# Patient Record
Sex: FEMALE | Race: WHITE | Employment: FULL TIME | URBAN - METROPOLITAN AREA
[De-identification: names, ages, dates, MRNs, and addresses within clinical notes are randomized per-mention and may not be internally consistent; named-entity substitution may affect disease eponyms.]

---

## 2017-05-02 ENCOUNTER — HOSPITAL ENCOUNTER (OUTPATIENT)
Dept: OTHER | Age: 57
Discharge: HOME | End: 2017-05-02
Attending: OBSTETRICS & GYNECOLOGY | Admitting: OBSTETRICS & GYNECOLOGY

## 2017-05-02 VITALS
BODY MASS INDEX: 26.68 KG/M2 | HEIGHT: 62 IN | DIASTOLIC BLOOD PRESSURE: 72 MMHG | SYSTOLIC BLOOD PRESSURE: 118 MMHG | RESPIRATION RATE: 18 BRPM | TEMPERATURE: 98.5 F | HEART RATE: 86 BPM | WEIGHT: 145 LBS

## 2017-05-02 RX ORDER — DULOXETIN HYDROCHLORIDE 60 MG/1
60 CAPSULE, DELAYED RELEASE ORAL DAILY
COMMUNITY
End: 2020-01-06

## 2017-05-02 RX ORDER — AMITRIPTYLINE HYDROCHLORIDE 25 MG/1
25 TABLET, FILM COATED ORAL NIGHTLY
COMMUNITY
End: 2020-01-08 | Stop reason: SDUPTHER

## 2017-05-02 RX ORDER — LOSARTAN POTASSIUM 100 MG/1
100 TABLET ORAL DAILY
COMMUNITY
End: 2020-01-06

## 2017-05-02 RX ORDER — ESTRADIOL 10 UG/1
10 INSERT VAGINAL DAILY
COMMUNITY
End: 2020-01-06

## 2017-05-02 RX ORDER — ATORVASTATIN CALCIUM 20 MG/1
20 TABLET, FILM COATED ORAL DAILY
COMMUNITY
End: 2020-01-06

## 2017-06-20 ENCOUNTER — HOSPITAL ENCOUNTER (OUTPATIENT)
Dept: OTHER | Age: 57
Discharge: HOME | End: 2017-06-20
Attending: OBSTETRICS & GYNECOLOGY | Admitting: OBSTETRICS & GYNECOLOGY

## 2017-06-20 VITALS
DIASTOLIC BLOOD PRESSURE: 72 MMHG | SYSTOLIC BLOOD PRESSURE: 130 MMHG | WEIGHT: 145 LBS | BODY MASS INDEX: 26.68 KG/M2 | RESPIRATION RATE: 18 BRPM | TEMPERATURE: 98.3 F | HEIGHT: 62 IN

## 2017-06-20 RX ORDER — CYCLOBENZAPRINE HCL 5 MG
5 TABLET ORAL DAILY
COMMUNITY
End: 2020-01-08 | Stop reason: SDUPTHER

## 2017-08-01 ENCOUNTER — HOSPITAL ENCOUNTER (OUTPATIENT)
Dept: OTHER | Age: 57
Discharge: HOME | End: 2017-08-01
Attending: OBSTETRICS & GYNECOLOGY | Admitting: OBSTETRICS & GYNECOLOGY

## 2017-08-01 VITALS
HEIGHT: 62 IN | HEART RATE: 83 BPM | DIASTOLIC BLOOD PRESSURE: 83 MMHG | SYSTOLIC BLOOD PRESSURE: 147 MMHG | BODY MASS INDEX: 28.52 KG/M2 | WEIGHT: 155 LBS

## 2018-03-21 ENCOUNTER — PAT TELEPHONE (OUTPATIENT)
Dept: PREADMISSION TESTING | Age: 58
End: 2018-03-21

## 2018-03-21 VITALS — HEIGHT: 62 IN | WEIGHT: 155 LBS | BODY MASS INDEX: 28.52 KG/M2

## 2018-03-21 NOTE — PROGRESS NOTES
[facility] (formerly [facility]) time____0630________        Surgery time___0730_________    Take the following medications with a sip of water:    Do not eat or drink anything after 12:00 midnight prior to your surgery. EXCEPT PREP  This includes water chewing gum, mints and ice chips. You may brush your teeth and gargle the morning of your surgery, but do not swallow the water      You may be asked to stop blood thinners such as Coumadin, Plavix, Fragmin, Lovenox, etc., or any anti-inflammatories such as:  Aspirin, Ibuprofen, Advil, Naproxen prior to your surgery. We also ask that you stop any OTC medications such as fish oil, vitamin E, glucosamine, garlic, Multivitamins, COQ 10, etc.    We ask that you do not smoke 24 hours prior to surgery  We ask that you do not  drink any alcoholic beverages 24 hours prior to surgery     You must make arrangements for a responsible adult to take you home after your surgery. For your safety you will not be allowed to leave alone or drive yourself home. Your surgery will be cancelled if you do not have a ride home. Also for your safety, it is strongly suggested that someone stay with you the first 24 hours after your surgery. A parent or legal guardian must accompany a child scheduled for surgery and plan to stay at the hospital until the child is discharged. Please do not bring other children with you. For your comfort, please wear simple loose fitting clothing to the hospital.  Please do not bring valuables. Do not wear any make-up or nail polish on your fingers or toes      For your safety, please do not wear any jewelry or body piercing's on the day of surgery. All jewelry must be removed. If you have dentures, they will be removed before going to operating room. For your convenience, we will provide you with a container.     If you wear contact lenses or glasses, they will be removed, please bring a case for

## 2018-03-26 NOTE — ANESTHESIA PRE-OP
Department of Anesthesiology  Preprocedure Note       Name:  Sosa Agosto   Age:  61 y.o.  :  1960                                          MRN:  [id number]         Date:  3/26/2018      Surgeon:    Procedure:    Medications prior to admission:   Prior to Admission medications    Medication Sig Start Date End Date Taking? Authorizing Provider   cyclobenzaprine (FLEXERIL) 5 MG tablet Take 5 mg by mouth daily    Historical Provider, MD   DULoxetine (CYMBALTA) 60 MG extended release capsule Take 60 mg by mouth daily    Historical Provider, MD   losartan (COZAAR) 100 MG tablet Take 100 mg by mouth daily    Historical Provider, MD   amitriptyline (ELAVIL) 25 MG tablet Take 25 mg by mouth nightly    Historical Provider, MD   atorvastatin (LIPITOR) 20 MG tablet Take 20 mg by mouth daily    Historical Provider, MD   Estradiol (VAGIFEM) 10 MCG TABS vaginal tablet Place 10 mcg vaginally daily    Historical Provider, MD       Current medications:    Current Outpatient Prescriptions   Medication Sig Dispense Refill    cyclobenzaprine (FLEXERIL) 5 MG tablet Take 5 mg by mouth daily      DULoxetine (CYMBALTA) 60 MG extended release capsule Take 60 mg by mouth daily      losartan (COZAAR) 100 MG tablet Take 100 mg by mouth daily      amitriptyline (ELAVIL) 25 MG tablet Take 25 mg by mouth nightly      atorvastatin (LIPITOR) 20 MG tablet Take 20 mg by mouth daily      Estradiol (VAGIFEM) 10 MCG TABS vaginal tablet Place 10 mcg vaginally daily       No current facility-administered medications for this encounter. Allergies:     Allergies   Allergen Reactions    Sansert [Methysergide]     Vicodin [Hydrocodone-Acetaminophen]        Problem List:    Patient Active Problem List   Diagnosis Code    [facility] DJD(carpometacarpal degenerative joint disease), localized primary M19.049       Past Medical History:        Diagnosis Date    HBP (high blood pressure)     Hyperlipidemia     Sleep apnea     CPAP       Past Surgical History:        Procedure Laterality Date    CARPAL TUNNEL RELEASE       SECTION      FOOT NEUROMA SURGERY      HYSTERECTOMY         Social History:    Social History   Substance Use Topics    Smoking status: Never Smoker    Smokeless tobacco: Never Used    Alcohol use Yes      Comment: monthly, occasional drink                                Counseling given: Not Answered      Vital Signs (Current): There were no vitals filed for this visit. BP Readings from Last 3 Encounters:   17 (!) 147/83   17 130/72   17 118/72       NPO Status:                                                                                 BMI:   Wt Readings from Last 3 Encounters:   18 155 lb (70.3 kg)   17 155 lb (70.3 kg)   17 145 lb (65.8 kg)     There is no height or weight on file to calculate BMI. Anesthesia Evaluation  Patient summary reviewed and Nursing notes reviewed no history of anesthetic complications:   Airway: Mallampati: II  TM distance: >3 FB   Neck ROM: full  Mouth opening: > = 3 FB Dental:          Pulmonary:Negative Pulmonary ROS and normal exam    (+) sleep apnea: on CPAP,                             Cardiovascular:Negative CV ROS    (+) hypertension:,                ROS comment: No chest pain. > 4 mets. No anticoagulation. Neuro/Psych:   Negative Neuro/Psych ROS              GI/Hepatic/Renal: Neg GI/Hepatic/Renal ROS       (-) hiatal hernia and GERD       Endo/Other: Negative Endo/Other ROS                    Abdominal:           Vascular:                                   NPO > MN    ECHO 2011   EF 60% Mild AI LVH    Pre-Operative Diagnosis: POLYP SURVEILLANCE/ ANTHEM    61 y.o.   BMI:  Body mass index is 28.13 kg/m².      Vitals:    18 0705   BP: (!) 156/91   Pulse: 92   Resp: 18   Temp: 98.3 °F (36.8 °C)   TempSrc: Temporal   SpO2: 97%   Weight: 153 lb 12.8 oz (69.8 kg)   Height: 5' 2\" (1.575 m)

## 2018-03-27 ENCOUNTER — HOSPITAL ENCOUNTER (OUTPATIENT)
Dept: ENDOSCOPY | Age: 58
Discharge: HOME | End: 2018-03-27
Attending: INTERNAL MEDICINE | Admitting: INTERNAL MEDICINE

## 2018-03-27 VITALS
DIASTOLIC BLOOD PRESSURE: 85 MMHG | BODY MASS INDEX: 28.3 KG/M2 | RESPIRATION RATE: 16 BRPM | TEMPERATURE: 98 F | OXYGEN SATURATION: 97 % | WEIGHT: 153.8 LBS | HEIGHT: 62 IN | HEART RATE: 74 BPM | SYSTOLIC BLOOD PRESSURE: 131 MMHG

## 2018-03-27 DIAGNOSIS — Z86.010 HISTORY OF COLONIC POLYPS: ICD-10-CM

## 2018-03-27 RX ORDER — MEPERIDINE HYDROCHLORIDE 25 MG/ML
12.5 INJECTION INTRAMUSCULAR; INTRAVENOUS; SUBCUTANEOUS EVERY 5 MIN PRN
Status: DISCONTINUED | OUTPATIENT
Start: 2018-03-27 | End: 2018-03-28 | Stop reason: HOSPADM

## 2018-03-27 RX ORDER — ONDANSETRON 2 MG/ML
4 INJECTION INTRAMUSCULAR; INTRAVENOUS
Status: ACTIVE | OUTPATIENT
Start: 2018-03-27 | End: 2018-03-27

## 2018-03-27 RX ORDER — MORPHINE SULFATE 4 MG/ML
2 INJECTION, SOLUTION INTRAMUSCULAR; INTRAVENOUS EVERY 5 MIN PRN
Status: DISCONTINUED | OUTPATIENT
Start: 2018-03-27 | End: 2018-03-28 | Stop reason: HOSPADM

## 2018-03-27 RX ORDER — SODIUM CHLORIDE 0.9 % (FLUSH) 0.9 %
10 SYRINGE (ML) INJECTION PRN
Status: DISCONTINUED | OUTPATIENT
Start: 2018-03-27 | End: 2018-03-28 | Stop reason: HOSPADM

## 2018-03-27 RX ORDER — SODIUM CHLORIDE 9 MG/ML
INJECTION, SOLUTION INTRAVENOUS CONTINUOUS
Status: DISCONTINUED | OUTPATIENT
Start: 2018-03-27 | End: 2018-03-28 | Stop reason: HOSPADM

## 2018-03-27 RX ORDER — LABETALOL HYDROCHLORIDE 5 MG/ML
5 INJECTION, SOLUTION INTRAVENOUS EVERY 10 MIN PRN
Status: DISCONTINUED | OUTPATIENT
Start: 2018-03-27 | End: 2018-03-28 | Stop reason: HOSPADM

## 2018-03-27 RX ORDER — SODIUM CHLORIDE 0.9 % (FLUSH) 0.9 %
10 SYRINGE (ML) INJECTION EVERY 12 HOURS SCHEDULED
Status: DISCONTINUED | OUTPATIENT
Start: 2018-03-27 | End: 2018-03-28 | Stop reason: HOSPADM

## 2018-03-27 RX ORDER — HYDRALAZINE HYDROCHLORIDE 20 MG/ML
5 INJECTION INTRAMUSCULAR; INTRAVENOUS
Status: DISCONTINUED | OUTPATIENT
Start: 2018-03-27 | End: 2018-03-28 | Stop reason: HOSPADM

## 2018-03-27 RX ORDER — MORPHINE SULFATE 4 MG/ML
1 INJECTION, SOLUTION INTRAMUSCULAR; INTRAVENOUS EVERY 5 MIN PRN
Status: DISCONTINUED | OUTPATIENT
Start: 2018-03-27 | End: 2018-03-28 | Stop reason: HOSPADM

## 2018-03-27 RX ORDER — DIPHENHYDRAMINE HYDROCHLORIDE 50 MG/ML
12.5 INJECTION INTRAMUSCULAR; INTRAVENOUS
Status: ACTIVE | OUTPATIENT
Start: 2018-03-27 | End: 2018-03-27

## 2018-03-27 RX ADMIN — SODIUM CHLORIDE: 9 INJECTION, SOLUTION INTRAVENOUS at 07:09

## 2018-03-27 ASSESSMENT — PAIN SCALES - GENERAL
PAINLEVEL_OUTOF10: 0

## 2018-03-27 ASSESSMENT — PAIN SCALES - WONG BAKER: WONGBAKER_NUMERICALRESPONSE: 0

## 2018-03-27 ASSESSMENT — PAIN DESCRIPTION - DESCRIPTORS: DESCRIPTORS: ACHING

## 2018-03-27 ASSESSMENT — PAIN - FUNCTIONAL ASSESSMENT: PAIN_FUNCTIONAL_ASSESSMENT: 0-10

## 2018-03-27 NOTE — ANESTHESIA POST-OP
Postoperative Anesthesia Note    Name:    Nu Schmidt  MRN:      [id number]    Patient Vitals for the past 12 hrs:   BP Temp Temp src Pulse Resp SpO2 Height Weight   03/27/18 0810 131/85 - - 74 16 97 % - -   03/27/18 0800 122/66 - - 80 16 98 % - -   03/27/18 0750 116/68 98 °F (36.7 °C) Temporal 76 18 97 % - -   03/27/18 0705 (!) 156/91 98.3 °F (36.8 °C) Temporal 92 18 97 % 5' 2\" (1.575 m) 153 lb 12.8 oz (69.8 kg)        LABS:    CBC  No results found for: WBC, HGB, HCT, PLT  RENAL  No results found for: NA, K, CL, CO2, BUN, CREATININE, GLUCOSE  COAGS  No results found for: PROTIME, INR, APTT    Intake & Output: In: 350 [I.V.:350]  Out: -     Nausea & Vomiting:  No    Level of Consciousness:  Awake    Pain Assessment:  Adequate analgesia    Anesthesia Complications:  No apparent anesthetic complications    SUMMARY      Vital signs stable  OK to discharge from Stage I post anesthesia care.   Care transferred from Anesthesiology department on discharge from perioperative area

## 2018-03-27 NOTE — PROCEDURES
[address] [facility] GI  Endoscopy Note    Patient: Prince Qiu  : 1960  Acct#: [de-identified]    Procedure: Colonoscopy with biopsy    Date:  3/27/2018    Surgeon:  Mallory Tyson MD    Referring Physician:  Nathan Batista    Previous Colonoscopy: Yes  Date: 10/3/12  Greater than 3 years? Yes    Preoperative Diagnosis:  surveillance    Postoperative Diagnosis:  Colon polyp    Anesthesia:  See anesthesia note    Indications: This is a 61y.o. year old female who presents today with previous adenomatous polyp. Procedure: An informed consent was obtained from the patient after explanation of indications, benefits, possible risks and complications of the procedure. The patient was then taken to the endoscopy suite, placed in the left lateral decubitus position, and the above IV anesthesia was administered. A digital rectal examination was performed and revealed negative without mass, lesions or tenderness. The Olympus CFQ-180-AL video colonoscope was placed in the patient's rectum under digital direction and advanced to the cecum. The cecum was identified by characteristic anatomy and ballottment. The prep was fair. The ileocecal valve was identified. The scope was then withdrawn back through the cecum, ascending, transverse, descending and sigmoid colons. Carefull circumferential examination of the mucosa in these areas demonstrated a 3 mm polyp in the descending colon that was biopsied and removed. The scope was then withdrawn into the rectum and retroflexed. The retroflexed view of the anal verge and rectum demonstrates no abnormalities. The scope was straightened, the colon was decompressed and the scope was withdrawn from the patient. The patient tolerated the procedure well and was taken to the PACU in good condition. Estimated Blood Loss:  none    Impression: Colon polyp    Recommendations:  Await pathology. Repeat colonoscopy in 5 years.     Mallory Tyson MD   [facility]  3/27/2018

## 2019-05-06 ENCOUNTER — EMPLOYEE WELLNESS (OUTPATIENT)
Dept: OTHER | Age: 59
End: 2019-05-06

## 2019-05-06 LAB
CHOLESTEROL, TOTAL: 187 MG/DL (ref 0–199)
GLUCOSE BLD-MCNC: 87 MG/DL (ref 70–99)
HDLC SERPL-MCNC: 44 MG/DL (ref 40–60)
LDL CHOLESTEROL CALCULATED: 123 MG/DL
TRIGL SERPL-MCNC: 100 MG/DL (ref 0–150)

## 2019-05-13 VITALS — WEIGHT: 143 LBS | BODY MASS INDEX: 26.16 KG/M2

## 2020-01-05 NOTE — PROGRESS NOTES
Chief Complaint   Patient presents with   Edin Norris Doctor     Family History   Problem Relation Age of Onset    Arthritis Other     Asthma Other     Cancer Other     Diabetes Other     High Blood Pressure Other     Breast Cancer Mother     Other Mother      Social History     Socioeconomic History    Marital status:      Spouse name: Not on file    Number of children: Not on file    Years of education: Not on file    Highest education level: Not on file   Occupational History    Not on file   Social Needs    Financial resource strain: Not on file    Food insecurity:     Worry: Not on file     Inability: Not on file    Transportation needs:     Medical: Not on file     Non-medical: Not on file   Tobacco Use    Smoking status: Never Smoker    Smokeless tobacco: Never Used   Substance and Sexual Activity    Alcohol use: Yes     Comment: monthly, occasional drink    Drug use: No    Sexual activity: Yes   Lifestyle    Physical activity:     Days per week: Not on file     Minutes per session: Not on file    Stress: Not on file   Relationships    Social connections:     Talks on phone: Not on file     Gets together: Not on file     Attends Congregational service: Not on file     Active member of club or organization: Not on file     Attends meetings of clubs or organizations: Not on file     Relationship status: Not on file    Intimate partner violence:     Fear of current or ex partner: Not on file     Emotionally abused: Not on file     Physically abused: Not on file     Forced sexual activity: Not on file   Other Topics Concern    Not on file   Social History Narrative    Not on file       Current Outpatient Medications:     atorvastatin (LIPITOR) 80 MG tablet, TAKE 1 TABLET BY MOUTH ONCE DAILY, Disp: , Rfl:     etodolac (LODINE) 400 MG tablet, Take 400 mg by mouth 2 times daily, Disp: , Rfl:     fluticasone (FLONASE) 50 MCG/ACT nasal spray, 1 spray by Nasal route, Disp: , Rfl:   losartan-hydrochlorothiazide (HYZAAR) 100-12.5 MG per tablet, TAKE 1 TABLET BY MOUTH ONCE DAILY, Disp: , Rfl:     cyclobenzaprine (FLEXERIL) 5 MG tablet, Take 5 mg by mouth daily, Disp: , Rfl:     amitriptyline (ELAVIL) 25 MG tablet, Take 25 mg by mouth nightly, Disp: , Rfl:   Allergies   Allergen Reactions    Sansert [Methysergide]     Vicodin [Hydrocodone-Acetaminophen]        Patient Active Problem List   Diagnosis    Aia 16 DJD(carpometacarpal degenerative joint disease), localized primary    Fibromyalgia    Essential hypertension    Mixed hyperlipidemia    Dysthymia       HPI / ROS: Romina Hand presents for evaluation and management of :    New patient to me appears formerly saw Dr. Beatrice Randhawa    # Hyperlipidemia on statin rigoberto this no myalgias or weakness  Lab Results   Component Value Date    LDLCALC 123 (H) 05/06/2019      No results found for: ALT, AST, GGT, ALKPHOS, BILITOT   # Depression - denies SI. OK on current med(s) per patient. # fibromyalgia / chronic pain/fatigue   apparently per meds  # HTN - rigoberto meds no CP/SOB  Lab Results   Component Value Date    GLUCOSE 87 05/06/2019       # d/w screening options re colon, cerv, breast ca screening  # reports mammogram [facility]  # colon screening has had colonoscopy twice for 1 polyp goes to Dr. Hudson Calhoun next 2 years       Objective   Wt Readings from Last 3 Encounters:   01/06/20 153 lb 6.4 oz (69.6 kg)   05/06/19 143 lb (64.9 kg)   03/27/18 153 lb 12.8 oz (69.8 kg)       A&O  /70   Pulse 85   Resp 15   Wt 153 lb 6.4 oz (69.6 kg)   SpO2 97%   BMI 28.06 kg/m²   Eyes no scleral icterus  Skin no rash no jaundice  Neck no TMG no LAD  Car reg no MGR  Lungs CTA  abd benign soft  Ext no pitting edema  Psych: Judgement and insight are intact, no pressured speech; no psychomotor retardation or agitation; affect and mood congruent     Diagnosis Orders   1. Essential hypertension  Comprehensive Metabolic Panel    at goal check renal   2.  Mixed

## 2020-01-06 ENCOUNTER — OFFICE VISIT (OUTPATIENT)
Dept: FAMILY MEDICINE CLINIC | Age: 60
End: 2020-01-06
Payer: COMMERCIAL

## 2020-01-06 VITALS
OXYGEN SATURATION: 97 % | HEART RATE: 85 BPM | BODY MASS INDEX: 28.06 KG/M2 | RESPIRATION RATE: 15 BRPM | WEIGHT: 153.4 LBS | SYSTOLIC BLOOD PRESSURE: 124 MMHG | DIASTOLIC BLOOD PRESSURE: 70 MMHG

## 2020-01-06 PROBLEM — F34.1 DYSTHYMIA: Status: ACTIVE | Noted: 2020-01-06

## 2020-01-06 PROBLEM — M79.7 FIBROMYALGIA: Status: ACTIVE | Noted: 2020-01-06

## 2020-01-06 PROBLEM — I10 ESSENTIAL HYPERTENSION: Status: ACTIVE | Noted: 2020-01-06

## 2020-01-06 PROBLEM — E78.2 MIXED HYPERLIPIDEMIA: Status: ACTIVE | Noted: 2020-01-06

## 2020-01-06 PROCEDURE — 99203 OFFICE O/P NEW LOW 30 MIN: CPT | Performed by: FAMILY MEDICINE

## 2020-01-06 RX ORDER — ETODOLAC 400 MG/1
400 TABLET, FILM COATED ORAL 2 TIMES DAILY
COMMUNITY
Start: 2019-09-25 | End: 2020-01-08 | Stop reason: SDUPTHER

## 2020-01-06 RX ORDER — ATORVASTATIN CALCIUM 80 MG/1
TABLET, FILM COATED ORAL
COMMUNITY
Start: 2019-10-21 | End: 2020-01-08 | Stop reason: SDUPTHER

## 2020-01-06 RX ORDER — FLUTICASONE PROPIONATE 50 MCG
1 SPRAY, SUSPENSION (ML) NASAL DAILY PRN
COMMUNITY
Start: 2016-03-23

## 2020-01-06 RX ORDER — LOSARTAN POTASSIUM AND HYDROCHLOROTHIAZIDE 12.5; 1 MG/1; MG/1
TABLET ORAL
COMMUNITY
Start: 2019-12-04 | End: 2020-01-08 | Stop reason: SDUPTHER

## 2020-01-08 ENCOUNTER — TELEPHONE (OUTPATIENT)
Dept: FAMILY MEDICINE CLINIC | Age: 60
End: 2020-01-08

## 2020-01-08 NOTE — TELEPHONE ENCOUNTER
Pt needs a refill on all medication EXCEPT Flonase sent to [facility] pharmacy. Please call with any questions.

## 2020-01-09 RX ORDER — LOSARTAN POTASSIUM AND HYDROCHLOROTHIAZIDE 12.5; 1 MG/1; MG/1
TABLET ORAL
Qty: 90 TABLET | Refills: 3 | Status: SHIPPED | OUTPATIENT
Start: 2020-01-09 | End: 2020-11-25 | Stop reason: SDUPTHER

## 2020-01-09 RX ORDER — ATORVASTATIN CALCIUM 80 MG/1
TABLET, FILM COATED ORAL
Qty: 90 TABLET | Refills: 3 | Status: SHIPPED | OUTPATIENT
Start: 2020-01-09 | End: 2020-11-25 | Stop reason: SDUPTHER

## 2020-01-09 RX ORDER — CYCLOBENZAPRINE HCL 5 MG
5 TABLET ORAL DAILY
Qty: 90 TABLET | Refills: 3 | Status: SHIPPED | OUTPATIENT
Start: 2020-01-09 | End: 2020-12-08

## 2020-01-09 RX ORDER — AMITRIPTYLINE HYDROCHLORIDE 25 MG/1
25 TABLET, FILM COATED ORAL NIGHTLY
Qty: 90 TABLET | Refills: 3 | Status: SHIPPED | OUTPATIENT
Start: 2020-01-09 | End: 2020-12-08

## 2020-01-09 RX ORDER — ETODOLAC 400 MG/1
400 TABLET, FILM COATED ORAL 2 TIMES DAILY
Qty: 180 TABLET | Refills: 3 | Status: SHIPPED | OUTPATIENT
Start: 2020-01-09 | End: 2020-02-26

## 2020-01-13 ENCOUNTER — TELEPHONE (OUTPATIENT)
Dept: ORTHOPEDIC SURGERY | Age: 60
End: 2020-01-13

## 2020-01-13 ENCOUNTER — OFFICE VISIT (OUTPATIENT)
Dept: ORTHOPEDIC SURGERY | Age: 60
End: 2020-01-13
Payer: COMMERCIAL

## 2020-01-13 VITALS
DIASTOLIC BLOOD PRESSURE: 71 MMHG | HEART RATE: 81 BPM | RESPIRATION RATE: 16 BRPM | SYSTOLIC BLOOD PRESSURE: 122 MMHG | BODY MASS INDEX: 28.16 KG/M2 | HEIGHT: 62 IN | WEIGHT: 153 LBS | TEMPERATURE: 98.1 F

## 2020-01-13 PROCEDURE — 99203 OFFICE O/P NEW LOW 30 MIN: CPT | Performed by: ORTHOPAEDIC SURGERY

## 2020-01-13 NOTE — LETTER
WEIGHT:  Wt Readings from Last 1 Encounters:   01/13/20 153 lb (69.4 kg)         ALLERGIES:Sansert [methysergide] and Vicodin [hydrocodone-acetaminophen]                           SURG   5/26/20                               JET   __________________________________________________________________  PRE-OP ORDERS:  ? CBC WITH DIFFERENTIAL                                                ? TYPE AND SCREEN                                                            ? HgB A1C                                                                               ? EKG                                                                                        ? NASAL CULUTRE MRSA  ? UAR/if positive repeat UAR on admission  ? BMP           ALBUMIN AND PREALBUMIN           VITAMIN D LEVELS  ? COAG PROFILE  ? SED RATE  ? PT/OT EVAL AND TEACHING  ? INSTRUCT PT TO STOP ALL NSAIDS, ASPIRIN, BLOOD THINNERS 7 DAYS PRIOR SURGERY  DAY OF SURGERY  ? CEFAZOLIN 2 GM IVPB; IF PATIENT WEIGHS > 80 KG AND SERUM CREATININE <2.5 mg/dl, GIVE 2 GM DOSE WITHIN 1 HOUR OF INCISION. ? IF THE PRE-OP NASAL CULTURE FOR MRSA WAS POSITIVE:   REPEAT NASAL SWAB ON ADMISSION AND ADMINISTER VANCOMYCIN 1 GM IVPB, REDUCE THE DOSE OF VANCOMYCIN  MG IVPB IF PT < 55 KG OR SERUM CREATININE > 2mg/dl; also to get Cefazolin 2 GM or wt based  ? All patients will receive preop Cefazolin 2 GM or wt based   ? APPLY KNEE HIGH ANTI-EMBOLIC AND PNEUMO-BOOTS TO UNOPERATIVE  LEG  ? CELEBREX  200 MG  ORALLY  DAY OF SURGERY  ?  ROXICODONE 10MG  ORALLY DAY OF SURGERY  OTHER ORDERS:_______________________________________________________PHYSICIAN SIGNATURE: __ 1/13/20                                                                                                       9:37 AM  ________________________DATE:

## 2020-01-13 NOTE — TELEPHONE ENCOUNTER
.Order received and faxed to [address] [facility]. Pt should call to schedule follow up for results after scan is completed.

## 2020-01-14 DIAGNOSIS — M17.12 PRIMARY OSTEOARTHRITIS OF LEFT KNEE: ICD-10-CM

## 2020-01-14 LAB
ALBUMIN SERPL-MCNC: 4.7 G/DL (ref 3.4–5)
ANION GAP SERPL CALCULATED.3IONS-SCNC: 14 MMOL/L (ref 3–16)
APTT: 34.8 SEC (ref 24.2–36.2)
BASOPHILS ABSOLUTE: 0.1 K/UL (ref 0–0.2)
BASOPHILS RELATIVE PERCENT: 0.9 %
BILIRUBIN URINE: ABNORMAL
BLOOD, URINE: NEGATIVE
BUN BLDV-MCNC: 19 MG/DL (ref 7–20)
CALCIUM SERPL-MCNC: 9.5 MG/DL (ref 8.3–10.6)
CHLORIDE BLD-SCNC: 103 MMOL/L (ref 99–110)
CLARITY: CLEAR
CO2: 27 MMOL/L (ref 21–32)
COLOR: YELLOW
CREAT SERPL-MCNC: 0.6 MG/DL (ref 0.6–1.2)
EOSINOPHILS ABSOLUTE: 0.2 K/UL (ref 0–0.6)
EOSINOPHILS RELATIVE PERCENT: 2.9 %
EPITHELIAL CELLS, UA: 1 /HPF (ref 0–5)
ESTIMATED AVERAGE GLUCOSE: 108.3 MG/DL
GFR AFRICAN AMERICAN: >60
GFR NON-AFRICAN AMERICAN: >60
GLUCOSE BLD-MCNC: 87 MG/DL (ref 70–99)
GLUCOSE URINE: NEGATIVE MG/DL
HBA1C MFR BLD: 5.4 %
HCT VFR BLD CALC: 38.6 % (ref 36–48)
HEMOGLOBIN: 13 G/DL (ref 12–16)
HYALINE CASTS: 1 /LPF (ref 0–8)
INR BLD: 0.91 (ref 0.86–1.14)
KETONES, URINE: NEGATIVE MG/DL
LEUKOCYTE ESTERASE, URINE: ABNORMAL
LYMPHOCYTES ABSOLUTE: 2.6 K/UL (ref 1–5.1)
LYMPHOCYTES RELATIVE PERCENT: 43 %
MCH RBC QN AUTO: 30 PG (ref 26–34)
MCHC RBC AUTO-ENTMCNC: 33.7 G/DL (ref 31–36)
MCV RBC AUTO: 88.8 FL (ref 80–100)
MICROSCOPIC EXAMINATION: YES
MONOCYTES ABSOLUTE: 0.5 K/UL (ref 0–1.3)
MONOCYTES RELATIVE PERCENT: 7.8 %
NEUTROPHILS ABSOLUTE: 2.8 K/UL (ref 1.7–7.7)
NEUTROPHILS RELATIVE PERCENT: 45.4 %
NITRITE, URINE: NEGATIVE
PDW BLD-RTO: 13.1 % (ref 12.4–15.4)
PH UA: 6.5 (ref 5–8)
PLATELET # BLD: 252 K/UL (ref 135–450)
PMV BLD AUTO: 9.5 FL (ref 5–10.5)
POTASSIUM SERPL-SCNC: 3.7 MMOL/L (ref 3.5–5.1)
PROTEIN UA: NEGATIVE MG/DL
PROTHROMBIN TIME: 10.5 SEC (ref 10–13.2)
RBC # BLD: 4.35 M/UL (ref 4–5.2)
RBC UA: 5 /HPF (ref 0–4)
SODIUM BLD-SCNC: 144 MMOL/L (ref 136–145)
SPECIFIC GRAVITY UA: 1.02 (ref 1–1.03)
TRANSFERRIN: 268 MG/DL (ref 200–360)
URINE REFLEX TO CULTURE: YES
URINE TYPE: ABNORMAL
UROBILINOGEN, URINE: 0.2 E.U./DL
VITAMIN D 25-HYDROXY: 35.6 NG/ML
WBC # BLD: 6.1 K/UL (ref 4–11)
WBC UA: 4 /HPF (ref 0–5)

## 2020-01-15 LAB — URINE CULTURE, ROUTINE: NORMAL

## 2020-01-20 ENCOUNTER — OFFICE VISIT (OUTPATIENT)
Dept: SLEEP MEDICINE | Age: 60
End: 2020-01-20
Payer: COMMERCIAL

## 2020-01-20 VITALS
OXYGEN SATURATION: 98 % | BODY MASS INDEX: 28.34 KG/M2 | DIASTOLIC BLOOD PRESSURE: 84 MMHG | HEART RATE: 75 BPM | RESPIRATION RATE: 16 BRPM | SYSTOLIC BLOOD PRESSURE: 134 MMHG | HEIGHT: 62 IN | WEIGHT: 154 LBS

## 2020-01-20 PROCEDURE — 99203 OFFICE O/P NEW LOW 30 MIN: CPT | Performed by: PSYCHIATRY & NEUROLOGY

## 2020-01-20 ASSESSMENT — SLEEP AND FATIGUE QUESTIONNAIRES
HOW LIKELY ARE YOU TO NOD OFF OR FALL ASLEEP WHILE LYING DOWN TO REST IN THE AFTERNOON WHEN CIRCUMSTANCES PERMIT: 2
ESS TOTAL SCORE: 10
HOW LIKELY ARE YOU TO NOD OFF OR FALL ASLEEP WHILE SITTING INACTIVE IN A PUBLIC PLACE: 0
NECK CIRCUMFERENCE (INCHES): 14
HOW LIKELY ARE YOU TO NOD OFF OR FALL ASLEEP WHILE SITTING QUIETLY AFTER LUNCH WITHOUT ALCOHOL: 2
HOW LIKELY ARE YOU TO NOD OFF OR FALL ASLEEP IN A CAR, WHILE STOPPED FOR A FEW MINUTES IN TRAFFIC: 1
HOW LIKELY ARE YOU TO NOD OFF OR FALL ASLEEP WHILE WATCHING TV: 1
HOW LIKELY ARE YOU TO NOD OFF OR FALL ASLEEP WHEN YOU ARE A PASSENGER IN A CAR FOR AN HOUR WITHOUT A BREAK: 1
HOW LIKELY ARE YOU TO NOD OFF OR FALL ASLEEP WHILE SITTING AND TALKING TO SOMEONE: 0
HOW LIKELY ARE YOU TO NOD OFF OR FALL ASLEEP WHILE SITTING AND READING: 3

## 2020-01-20 ASSESSMENT — ENCOUNTER SYMPTOMS
APNEA: 0
EYES NEGATIVE: 1
ALLERGIC/IMMUNOLOGIC NEGATIVE: 1
RESPIRATORY NEGATIVE: 1
GASTROINTESTINAL NEGATIVE: 1

## 2020-01-20 NOTE — PROGRESS NOTES
in addition to the Patient has significant daytime sleepiness. The Patient scored Total score: 10 on [place] Sleepiness Scale ( more than 10 is indicative of daytime sleepiness)and 42 in fatigue scale ( more than 36 is indicativeof daytime fatigue). The patient takes one nap a week for 30-60 minutes and usually is not refreshing nap. Previous evaluation and treatment has included- PSG with C PAP titration. Her HTN is stable.        DOT/CDL - N/A  FAA/'aziza - N/A      Previous Report(s) Reviewed: historical medical records         Social History     Socioeconomic History    Marital status:      Spouse name: Not on file    Number of children: Not on file    Years of education: Not on file    Highest education level: Not on file   Occupational History    Not on file   Social Needs    Financial resource strain: Not on file    Food insecurity:     Worry: Not on file     Inability: Not on file    Transportation needs:     Medical: Not on file     Non-medical: Not on file   Tobacco Use    Smoking status: Never Smoker    Smokeless tobacco: Never Used   Substance and Sexual Activity    Alcohol use: Yes     Comment: monthly, occasional drink    Drug use: No    Sexual activity: Yes   Lifestyle    Physical activity:     Days per week: Not on file     Minutes per session: Not on file    Stress: Not on file   Relationships    Social connections:     Talks on phone: Not on file     Gets together: Not on file     Attends Denominational service: Not on file     Active member of club or organization: Not on file     Attends meetings of clubs or organizations: Not on file     Relationship status: Not on file    Intimate partner violence:     Fear of current or ex partner: Not on file     Emotionally abused: Not on file     Physically abused: Not on file     Forced sexual activity: Not on file   Other Topics Concern    Not on file   Social History Narrative    Not on file       Prior to Admission medications    Medication Sig Start Date End Date Taking? Authorizing Provider   Solriamfetol HCl 75 MG TABS 1/2 QAM for a week then one tab QAM PRN 20  Yes Areli Freitas MD   losartan-hydrochlorothiazide (HYZAAR) 100-12.5 MG per tablet TAKE 1 TABLET BY MOUTH ONCE DAILY 20  Yes Brianna Chen MD   amitriptyline (ELAVIL) 25 MG tablet Take 1 tablet by mouth nightly 20  Yes Brianna Chen MD   atorvastatin (LIPITOR) 80 MG tablet TAKE 1 TABLET BY MOUTH ONCE DAILY 20  Yes Brianna Chen MD   cyclobenzaprine (FLEXERIL) 5 MG tablet Take 1 tablet by mouth daily 20  Yes Brianna Chen MD   etodolac (LODINE) 400 MG tablet Take 1 tablet by mouth 2 times daily 20  Yes Brianna Chen MD   fluticasone [facility]) 50 MCG/ACT nasal spray 1 spray by Nasal route 3/23/16  Yes Historical Provider, MD       Allergies as of 2020 - Review Complete 2020   Allergen Reaction Noted    Sansert [methysergide]  2018    Vicodin [hydrocodone-acetaminophen]  2013       Patient Active Problem List   Diagnosis    [facility] DJD(carpometacarpal degenerative joint disease), localized primary    Fibromyalgia    Essential hypertension    Mixed hyperlipidemia    Dysthymia       Past Medical History:   Diagnosis Date    HBP (high blood pressure)     Hyperlipidemia     Plantar fasciitis     Sleep apnea     CPAP       Past Surgical History:   Procedure Laterality Date    CARPAL TUNNEL RELEASE       SECTION  6397,8411, 1986, 1994    COLONOSCOPY  2018    Dr. Gwyn Rojas with polyp    FOOT NEUROMA SURGERY      HYSTERECTOMY      LASIK      SHOULDER SURGERY      SPINAL FUSION      L4       Family History   Problem Relation Age of Onset    Arthritis Other     Asthma Other     Cancer Other     Diabetes Other     High Blood Pressure Other     Breast Cancer Mother     Other Mother        Review of Systems   Constitutional: Positive for fatigue.    HENT: Positive for congestion General: No focal deficit present. Psychiatric:         Mood and Affect: Mood normal.         Assessment:   Severe Obstructive Sleep Apnea/Hypopnea Syndrome under good control on PAP, but has residual EDS     Diagnosis Orders   1. Hypersomnia  Solriamfetol HCl 75 MG TABS   2. DAVID on CPAP     3. DAVID treated with BiPAP  Solriamfetol HCl 75 MG TABS     Plan: Will continue the APAP between 5 and 20 cm,  I will try Sunosi for his residual daytime sleepiness. I will order CPAP supplies, mask, filters. No orders of the defined types were placed in this encounter. Return in about 6 months (around 7/20/2020) for EDS.     Monika Jeffers MD  Medical Director - [facility]

## 2020-01-20 NOTE — PROGRESS NOTES
RAPT  RISK ASSESSMENT and PREDICTION TOOL    Name: Gus Perkins  YOB: 1960  Surgeon: Dr Danae Ospina         Value Score    1). What is your age group? 50 - 65 years  = 2      66 - 75 years = 1     > 75 years = 0       Your score = 2   2). Gender? Male = 2     Female = 1       Your score = 1   3). How far on average can you walk? Two blocks or more (+/- rest) = 2    (a block is 200 [id number]=764 ft)  1 - 2 blocks (+/- rest) = 1     Housebound (most of time) = 0       Your score = 2   4). Which gait aid do you use? None = 2    (more often than not) Single-point stick = 1     Crutches/walker = 0       Your score = 2   5). Do you use community supports? None or one per week = 1    (home help, meals on wheels, district nursing) Two or more per week = 0       Your score = 1   6). Will you live with someone who can care for you after your operation? yes = 3     no = 0       Your score = 3    Your Total Score (out of 12) = 11       Key: Destination at discharge from acute care predicted by score.   Score < 6  = extended inpatient rehabilitation  Score 6 - 9  = additional intervention to discharge directly home (Rehab in the home)  Score > 9  = directly home      Patient's Preference Prediction Score Agreed destination   home 11 yes   Gus Perkins  Date: 1/13/20
This dictation was done with [employer] dictation and may contain mechanical errors related to translation. Blood pressure 122/71, pulse 81, temperature 98.1 °F (36.7 °C), temperature source Oral, resp. rate 16, height 5' 2\" (1.575 m), weight 153 lb (69.4 kg).
route  Historical Provider, MD     Physical examination 59-year-old female oriented x3 temperature is 98.1. Examination of her back shows mild decreased range of motion but no specific pain tenderness or instability. Motor exam shows quadriceps hamstrings hip abductors abductors foot plantar dorsiflexors are intact 4-5 over 5 to the left lower extremity. Sensation and perfusion are intact to the left lower extremity. There is no numbness or tingling noted in the left lower extremity. Deep tendon reflexes are 0 at the knee and 0 at the ankle of the left lower extremity. No other obvious cutaneous lesions lymphedema or cellulitic processes are noted in the left lower extremity. Examination of the left knee shows obvious effusion medial and posterior medial tenderness to palpation loss of full extension by a few degrees with flexion to 125 degrees noted. There are no signs of instability or deep sepsis noted in the left knee. Examination of the right knee shows similar findings loss of full extension medial joint line tenderness and overall varus alignment of the knee. No signs of instability deep sepsis are seen in the right knee. X-rays obtained AP lateral patellofemoral view of both right and left knee shows bilateral degenerative osteoarthritis. Medial bone-on-bone or near bone-on-bone findings with mild lateral tibial subluxation are seen. Osteophytes formation on both distal medial lateral femur and medial lateral tibia noted. Mild to moderate patellofemoral degenerative joint disease is seen. No other obvious fractures tumors or dislocations are noted on these x-rays. Impression 59-year-old female with bilateral tricompartmental degenerative osteoarthritis of her knees. She has had conservative management for multiple years as noted above with anti-inflammatories physical therapy and intra-articular injection. This patient wants to move towards left total knee arthroplasty.     We had a 35

## 2020-01-22 ENCOUNTER — OFFICE VISIT (OUTPATIENT)
Dept: ORTHOPEDIC SURGERY | Age: 60
End: 2020-01-22
Payer: COMMERCIAL

## 2020-01-22 VITALS
SYSTOLIC BLOOD PRESSURE: 154 MMHG | HEART RATE: 89 BPM | RESPIRATION RATE: 16 BRPM | BODY MASS INDEX: 28.34 KG/M2 | DIASTOLIC BLOOD PRESSURE: 82 MMHG | WEIGHT: 154 LBS | HEIGHT: 62 IN

## 2020-01-22 PROCEDURE — 99214 OFFICE O/P EST MOD 30 MIN: CPT | Performed by: ORTHOPAEDIC SURGERY

## 2020-01-27 ENCOUNTER — HOSPITAL ENCOUNTER (OUTPATIENT)
Dept: CT IMAGING | Age: 60
Discharge: HOME | End: 2020-01-27
Payer: COMMERCIAL

## 2020-01-27 ENCOUNTER — HOSPITAL ENCOUNTER (OUTPATIENT)
Dept: PHYSICAL THERAPY | Age: 60
Setting detail: THERAPIES SERIES
Discharge: HOME | End: 2020-01-27
Payer: COMMERCIAL

## 2020-01-27 ENCOUNTER — TELEPHONE (OUTPATIENT)
Dept: ORTHOPEDICS UNIT | Age: 60
End: 2020-01-27

## 2020-01-27 PROCEDURE — 97110 THERAPEUTIC EXERCISES: CPT

## 2020-01-27 PROCEDURE — 73700 CT LOWER EXTREMITY W/O DYE: CPT

## 2020-01-27 PROCEDURE — 97162 PT EVAL MOD COMPLEX 30 MIN: CPT

## 2020-01-27 PROCEDURE — 97530 THERAPEUTIC ACTIVITIES: CPT

## 2020-01-27 NOTE — PROGRESS NOTES
is the bedroom located in your post-surgery place of residence? [] 1st Floor [x] 2nd Floor  4. Do you use community supports (home help, meals-on-wheels, district nurse)? [x] None or 1 per week  [] 2 or more per week  5. How many stairs will you have to climb to get in to your place of residence? [x] Less than 5  [] More than 5  6. Have you had a fall in the past year? [] Yes  [x] No     Notes:  2 [facility]- continuous, no handrail. Full flight to get to second floor, handrail up half then on both sides    Available PT Visits:        FUNCTIONAL ASSESSMENT:   1. Do you use ambulatory aids? [x] None [] Single Point Cane  [] Crutches/Walker/Wheelchair  2. How far on average can you walk? [x] 2 Blocks or more  [] 1-2 Blocks  [] House bound most of the time  3. What is your physical activity level? [x] Highly Active [] Active  [] Somewhat active  [] Sedentary     Knee AROM (degrees) R L   Flexion (in supine) 138 138   Extension (use \"-\" to denote deficit) (long sitting, resting) 0 0     MMT (lbs) R L   Knee Extension (peak in seated) 28 26   Hip Abduction (peak in side lying) 26 20     Functional Testing (shoes on) Results   Timed Up and Go (TUG)                  (rounded seconds) 8   30 Second Sit-Stand Test                  (completed repetitions) 14   6 Minute Walk Test  (meters) 467     Standing Balance  (shoes on, rounded to the nearest seconds, 10 second max)     1. Stand with your feet side by side:   Time: 10          (sec)  2. Place the instep of one foot so it      Is touching the big toe of the surgical  Time:10      Foot (surgical foot in back)     (sec)     3. Place surgical foot behind so the toes  Time: 10      Are touching the heal of the other foot.   (sec)    4.  Stand on surgical foot   Time:10          (sec)       EXPECTED DISCHARGE DISPOSITION:                      [] Home no PT  [] Home w/ OP PT                                               If Discharge Disposition is to a facility, please indicate reason  [x] Home w/ [address] [employer] [facility]                               [] Lack of home support                   [] Medical Comorbidities  [] SNF/Inpatient Rehab                                          [] Transportation                              [] Other          ASSESSMENT:     Conditions Requiring Skilled Therapeutic Intervention  Body structures, Functions, Activity limitations: Decreased functional mobility , Decreased ADL status, Increased pain, Decreased strength  Assessment: Decreased Left LE strength and pain. Pt to have left TKR on 3/18/20  Treatment Diagnosis: Pain. Decreased knee strength  Prognosis: Good  Decision Making: Medium Complexity  REQUIRES PT FOLLOW UP: Yes  Treatment Initiated : Exercise: Pt was given handout of Dr. Bellamy Autumn TKR exercises and patient performed: Ankle Pump, HS and Gastroc Stretch, Quad Set, SLR, Heel Slide, LAQ, Mini Squat. Education: Pt was educated on what to expect follow TKR surgery with regards the therapy, use of assistive device, discharge planning, home set up, and functional abilities.   Decision Making: Decision Making: Medium Complexity    Goals:      G-Code (if applicable):                Plan:  Plan  Times per week: 1  Times per day: Daily  Plan weeks: 1  Current Treatment Recommendations: Strengthening, ROM, Home Exercise Program, Safety Education & Training    Therapy Time   Individual   Time In      Time Out     Minutes          Signature:  Akila Bhatti, PT

## 2020-01-27 NOTE — PROGRESS NOTES
Patient called with questions regarding clearance prior to surgery. Questions answered.  Electronically signed by Heather Stone RN on 1/27/2020 at 3:59 PM

## 2020-01-28 RX ORDER — ARMODAFINIL 150 MG/1
TABLET ORAL
Qty: 30 TABLET | Refills: 5 | Status: SHIPPED | OUTPATIENT
Start: 2020-01-28 | End: 2020-06-11 | Stop reason: SDUPTHER

## 2020-01-29 PROCEDURE — MISCCOLD COLD THERAPY UNIT AND PAD: Performed by: ORTHOPAEDIC SURGERY

## 2020-01-30 PROBLEM — M77.41 METATARSALGIA OF RIGHT FOOT: Status: ACTIVE | Noted: 2020-01-30

## 2020-01-30 NOTE — PROGRESS NOTES
CHIEF COMPLAINT: Right forefoot pain/ 2nd metatarsalgia. HISTORY:  Ms. Moss 60 y.o.  female presents today for the first visit for evaluation of right forefoot pain which started years ago.  She is complaining of sharp pain. Pain is increase with standing and walking. No numbness and tingling sensation. No other complaint. She saw Dr Narinder Mast DPM for this. 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     Past Medical History:   Diagnosis Date    HBP (high blood pressure)     Hyperlipidemia     Plantar fasciitis     Sleep apnea     CPAP       Past Surgical History:   Procedure Laterality Date    CARPAL TUNNEL RELEASE       SECTION  9347,7322, ,     COLONOSCOPY  2018    Dr. Teja Lee with polyp    FOOT NEUROMA SURGERY      HYSTERECTOMY      LASIK      SHOULDER SURGERY      SPINAL FUSION      L4       Social History     Socioeconomic History    Marital status:      Spouse name: Not on file    Number of children: Not on file    Years of education: Not on file    Highest education level: Not on file   Occupational History    Not on file   Social Needs    Financial resource strain: Not on file    Food insecurity:     Worry: Not on file     Inability: Not on file    Transportation needs:     Medical: Not on file     Non-medical: Not on file   Tobacco Use    Smoking status: Never Smoker    Smokeless tobacco: Never Used   Substance and Sexual Activity    Alcohol use: Yes     Comment: monthly, occasional drink    Drug use: No    Sexual activity: Yes   Lifestyle    Physical activity:     Days per week: Not on file     Minutes per session: Not on file    Stress: Not on file   Relationships    Social connections:     Talks on phone: Not on file     Gets together: Not on file     Attends [employer] service: Not on file     Active member of club or organization: Not on file     Attends meetings of clubs or dorsiflexion to about 15 degrees bilaterally, which increased with knee flexion. She has intact sensation and good pedal pulses.  She has good strength in all four planes, including eversion, and has tenderness on deep palpation over the right 2nd metatarsal head, without instability compared to the other side.  The ankles are stable to drawer test bilaterally, ankle reflex 1+ equally bilaterally.             IMAGING: Xray's were reviewed, 3 views of the right foot taken in office today, and showed no acute fracture. No other abnormality. IMPRESSION: Right 2nd metatarsalgia. PLAN: I discussed with the patient the treatment options. We recommended stretching exercises of the calf which was taught to the patient today. She will take NSAID PRN. Use soft orthosis, with metatarsal pad. I believe she may benefit from surgery with Weil shortening osteotomy if not better.       Alexandre Reid MD

## 2020-02-25 ENCOUNTER — TELEPHONE (OUTPATIENT)
Dept: ORTHOPEDIC SURGERY | Age: 60
End: 2020-02-25

## 2020-02-25 NOTE — TELEPHONE ENCOUNTER
I called the patient and left a message.     I need her to come to the office for nickel allergy testing

## 2020-02-26 ENCOUNTER — OFFICE VISIT (OUTPATIENT)
Dept: FAMILY MEDICINE CLINIC | Age: 60
End: 2020-02-26
Payer: COMMERCIAL

## 2020-02-26 ENCOUNTER — TELEPHONE (OUTPATIENT)
Dept: FAMILY MEDICINE CLINIC | Age: 60
End: 2020-02-26

## 2020-02-26 VITALS
OXYGEN SATURATION: 96 % | BODY MASS INDEX: 27.05 KG/M2 | HEART RATE: 90 BPM | SYSTOLIC BLOOD PRESSURE: 120 MMHG | TEMPERATURE: 99.5 F | WEIGHT: 147 LBS | DIASTOLIC BLOOD PRESSURE: 74 MMHG | RESPIRATION RATE: 12 BRPM | HEIGHT: 62 IN

## 2020-02-26 LAB
INFLUENZA A ANTIBODY: POSITIVE
INFLUENZA B ANTIBODY: ABNORMAL

## 2020-02-26 PROCEDURE — 87804 INFLUENZA ASSAY W/OPTIC: CPT | Performed by: INTERNAL MEDICINE

## 2020-02-26 PROCEDURE — 99213 OFFICE O/P EST LOW 20 MIN: CPT | Performed by: INTERNAL MEDICINE

## 2020-02-26 RX ORDER — ONDANSETRON 4 MG/1
4 TABLET, FILM COATED ORAL 3 TIMES DAILY PRN
Qty: 15 TABLET | Refills: 0 | Status: SHIPPED | OUTPATIENT
Start: 2020-02-26 | End: 2020-03-16 | Stop reason: ALTCHOICE

## 2020-02-26 NOTE — PROGRESS NOTES
HPI: Margaret Tolliver presents for this. Health issues include pretension, hyperlipidemia, dysthymia. Days ago onset of nocturnal vomiting. Positive myalgias headache. Abdominal discomfort and chest discomfort. Positive cough. No productive sputum. History of bronchitis when she was on lisinopril but none for 3 to 4 years. Negative tobacco.  Using DayQuil and NyQuil. Missed work yesterday. Did have flu vaccine. States that she is normally healthy. History hypertension. Lisinopril, chest pain shortness of breath syncope. Hospitalizations  Spinal fusion, shoulder surgery, hysterectomy, neuroma foot. Colonic polyp 2018, type tunnel release. Social history medical field. . No tobacco.  Rare alcohol. No drugs. Family history diabetes hypertension arthritis asthma.     Review of systems: Arthritis, obstructive sleep apnea, hypersomnia, tar fasciitis          Constitutional, ent, CV, respiratory, GI, , joint, skin, allergic and psychiatric ROS reviewed and negative except for above    Allergies   Allergen Reactions    Sansert [Methysergide]     Vicodin [Hydrocodone-Acetaminophen]        Outpatient Medications Marked as Taking for the 2/26/20 encounter (Office Visit) with Jessica Sandhu MD   Medication Sig Dispense Refill    ondansetron (ZOFRAN) 4 MG tablet Take 1 tablet by mouth 3 times daily as needed for Nausea or Vomiting 15 tablet 0    Armodafinil (NUVIGIL) 150 MG TABS tablet one tab QAM prn 30 tablet 5    losartan-hydrochlorothiazide (HYZAAR) 100-12.5 MG per tablet TAKE 1 TABLET BY MOUTH ONCE DAILY 90 tablet 3    amitriptyline (ELAVIL) 25 MG tablet Take 1 tablet by mouth nightly 90 tablet 3    atorvastatin (LIPITOR) 80 MG tablet TAKE 1 TABLET BY MOUTH ONCE DAILY 90 tablet 3             Past Medical History:   Diagnosis Date    HBP (high blood pressure)     Hyperlipidemia     Plantar fasciitis     Sleep apnea     CPAP       Past Surgical History:   Procedure

## 2020-02-26 NOTE — LETTER
[address],[place] [address]  Phone: [phone]  Fax: [phone]    Demario Bosch MD        February 26, 2020     Patient: Veronique Lester   YOB: 1960   Date of Visit: 2/26/2020       To Whom It May Concern: Josh Sosao seen today with illness precluding work 2/25/20 and should not return to work prior to 3. 2.20         Sincerely,        Demario Bosch MD

## 2020-02-28 NOTE — PROGRESS NOTES
Preop PE at request of Dr. Oumar Reyes for Left TKR at [facility] on [address] [facility] ROS : No new complaints. Patient denies chest pain, shortness of breath, or fever. PAST MEDICAL & SURGICAL HISTORY  Patient Active Problem List   Diagnosis    [facility] DJD(carpometacarpal degenerative joint disease), localized primary    Fibromyalgia    Essential hypertension    Mixed hyperlipidemia    Dysthymia    Metatarsalgia of right foot     Past Surgical History:   Procedure Laterality Date    CARPAL TUNNEL RELEASE       SECTION  [id number], ,     COLONOSCOPY  2018    Dr. Floridalma Campbell with polyp    FOOT NEUROMA SURGERY      HYSTERECTOMY      LASIK      SHOULDER SURGERY      SPINAL FUSION      L4       CURRENT MEDS  Current Outpatient Medications   Medication Sig Dispense Refill    ondansetron (ZOFRAN) 4 MG tablet Take 1 tablet by mouth 3 times daily as needed for Nausea or Vomiting 15 tablet 0    Armodafinil (NUVIGIL) 150 MG TABS tablet one tab QAM prn 30 tablet 5    losartan-hydrochlorothiazide (HYZAAR) 100-12.5 MG per tablet TAKE 1 TABLET BY MOUTH ONCE DAILY 90 tablet 3    amitriptyline (ELAVIL) 25 MG tablet Take 1 tablet by mouth nightly 90 tablet 3    atorvastatin (LIPITOR) 80 MG tablet TAKE 1 TABLET BY MOUTH ONCE DAILY 90 tablet 3    cyclobenzaprine (FLEXERIL) 5 MG tablet Take 1 tablet by mouth daily 90 tablet 3    fluticasone (FLONASE) 50 MCG/ACT nasal spray 1 spray by Nasal route       No current facility-administered medications for this visit.         ALLERGIES  Allergies   Allergen Reactions    Lisinopril      cough    Sansert [Methysergide]     Vicodin [Hydrocodone-Acetaminophen]        Social History     Tobacco Use    Smoking status: Never Smoker    Smokeless tobacco: Never Used   Substance Use Topics    Alcohol use: Yes     Comment: monthly, occasional drink    Drug use: No        Family History   Problem Relation Age of Onset    Arthritis Other     Asthma Other     Cancer Other  Diabetes Other     High Blood Pressure Other     Breast Cancer Mother     Other Mother         /80   Pulse 92   Temp 99 °F (37.2 °C)   Resp 16   Ht 5' 2\" (1.575 m)   Wt 149 lb 6.4 oz (67.8 kg)   SpO2 98%   BMI 27.33 kg/m²   General - alert and oriented; speaking easily in complete sentences  Skin - no rash or jaundice  Neck - No lymphadenopathy. No thyromegaly. No bruit. Lungs - clear to ascultation  Heart - Regular rate and rhythm, No murmur. No gallop. No rub. Abdomen - Abdomen soft, non-tender. BS normal. No masses. Extremities - no edema. Distal perfusion palpable. EKG: Normal sinus rhythm     Diagnosis Orders   1. Primary osteoarthritis of left knee      OK for surgery; all labs and EKG OK   2. Preop examination      as above   3.  Essential hypertension      at goal

## 2020-03-02 ENCOUNTER — PREP FOR PROCEDURE (OUTPATIENT)
Dept: ORTHOPEDIC SURGERY | Age: 60
End: 2020-03-02

## 2020-03-02 ENCOUNTER — OFFICE VISIT (OUTPATIENT)
Dept: FAMILY MEDICINE CLINIC | Age: 60
End: 2020-03-02
Payer: COMMERCIAL

## 2020-03-02 ENCOUNTER — TELEPHONE (OUTPATIENT)
Dept: ORTHOPEDIC SURGERY | Age: 60
End: 2020-03-02

## 2020-03-02 VITALS
WEIGHT: 149.4 LBS | DIASTOLIC BLOOD PRESSURE: 80 MMHG | OXYGEN SATURATION: 98 % | RESPIRATION RATE: 16 BRPM | TEMPERATURE: 99 F | HEART RATE: 92 BPM | HEIGHT: 62 IN | BODY MASS INDEX: 27.49 KG/M2 | SYSTOLIC BLOOD PRESSURE: 126 MMHG

## 2020-03-02 PROCEDURE — 99214 OFFICE O/P EST MOD 30 MIN: CPT | Performed by: FAMILY MEDICINE

## 2020-03-03 ENCOUNTER — HOSPITAL ENCOUNTER (OUTPATIENT)
Dept: PREADMISSION TESTING | Age: 60
Discharge: HOME | End: 2020-03-07
Payer: COMMERCIAL

## 2020-03-03 LAB
ABO/RH: NORMAL
ALBUMIN SERPL-MCNC: 4.4 G/DL (ref 3.4–5)
ANION GAP SERPL CALCULATED.3IONS-SCNC: 16 MMOL/L (ref 3–16)
ANTIBODY SCREEN: NORMAL
APTT: 30.4 SEC (ref 24.2–36.2)
BASOPHILS ABSOLUTE: 0 K/UL (ref 0–0.2)
BASOPHILS RELATIVE PERCENT: 0.6 %
BILIRUBIN URINE: NEGATIVE
BLOOD, URINE: NEGATIVE
BUN BLDV-MCNC: 22 MG/DL (ref 7–20)
CALCIUM SERPL-MCNC: 9.7 MG/DL (ref 8.3–10.6)
CHLORIDE BLD-SCNC: 102 MMOL/L (ref 99–110)
CLARITY: CLEAR
CO2: 29 MMOL/L (ref 21–32)
COLOR: YELLOW
CREAT SERPL-MCNC: 0.7 MG/DL (ref 0.6–1.2)
EKG ATRIAL RATE: 82 BPM
EKG DIAGNOSIS: NORMAL
EKG P AXIS: 61 DEGREES
EKG P-R INTERVAL: 176 MS
EKG Q-T INTERVAL: 378 MS
EKG QRS DURATION: 80 MS
EKG QTC CALCULATION (BAZETT): 441 MS
EKG R AXIS: 49 DEGREES
EKG T AXIS: 61 DEGREES
EKG VENTRICULAR RATE: 82 BPM
EOSINOPHILS ABSOLUTE: 0.1 K/UL (ref 0–0.6)
EOSINOPHILS RELATIVE PERCENT: 1.9 %
ESTIMATED AVERAGE GLUCOSE: 116.9 MG/DL
GFR AFRICAN AMERICAN: >60
GFR NON-AFRICAN AMERICAN: >60
GLUCOSE BLD-MCNC: 91 MG/DL (ref 70–99)
GLUCOSE URINE: NEGATIVE MG/DL
HBA1C MFR BLD: 5.7 %
HCT VFR BLD CALC: 40.8 % (ref 36–48)
HEMOGLOBIN: 13.7 G/DL (ref 12–16)
INR BLD: 1.01 (ref 0.86–1.14)
KETONES, URINE: NEGATIVE MG/DL
LEUKOCYTE ESTERASE, URINE: NEGATIVE
LYMPHOCYTES ABSOLUTE: 2.7 K/UL (ref 1–5.1)
LYMPHOCYTES RELATIVE PERCENT: 40.3 %
MCH RBC QN AUTO: 29.4 PG (ref 26–34)
MCHC RBC AUTO-ENTMCNC: 33.6 G/DL (ref 31–36)
MCV RBC AUTO: 87.6 FL (ref 80–100)
MICROSCOPIC EXAMINATION: NORMAL
MONOCYTES ABSOLUTE: 0.5 K/UL (ref 0–1.3)
MONOCYTES RELATIVE PERCENT: 7.6 %
NEUTROPHILS ABSOLUTE: 3.3 K/UL (ref 1.7–7.7)
NEUTROPHILS RELATIVE PERCENT: 49.6 %
NITRITE, URINE: NEGATIVE
PDW BLD-RTO: 13.2 % (ref 12.4–15.4)
PH UA: 6 (ref 5–8)
PLATELET # BLD: 268 K/UL (ref 135–450)
PMV BLD AUTO: 9.6 FL (ref 5–10.5)
POTASSIUM SERPL-SCNC: 3.7 MMOL/L (ref 3.5–5.1)
PREALBUMIN: 32.6 MG/DL (ref 20–40)
PROTEIN UA: NEGATIVE MG/DL
PROTHROMBIN TIME: 11.7 SEC (ref 10–13.2)
RBC # BLD: 4.66 M/UL (ref 4–5.2)
SEDIMENTATION RATE, ERYTHROCYTE: 11 MM/HR (ref 0–30)
SODIUM BLD-SCNC: 147 MMOL/L (ref 136–145)
SPECIFIC GRAVITY UA: 1.03 (ref 1–1.03)
URINE REFLEX TO CULTURE: NORMAL
URINE TYPE: NORMAL
UROBILINOGEN, URINE: 0.2 E.U./DL
VITAMIN D 25-HYDROXY: 30.5 NG/ML
WBC # BLD: 6.7 K/UL (ref 4–11)

## 2020-03-03 PROCEDURE — 85730 THROMBOPLASTIN TIME PARTIAL: CPT

## 2020-03-03 PROCEDURE — 86850 RBC ANTIBODY SCREEN: CPT

## 2020-03-03 PROCEDURE — 83036 HEMOGLOBIN GLYCOSYLATED A1C: CPT

## 2020-03-03 PROCEDURE — 80048 BASIC METABOLIC PNL TOTAL CA: CPT

## 2020-03-03 PROCEDURE — 85652 RBC SED RATE AUTOMATED: CPT

## 2020-03-03 PROCEDURE — 86901 BLOOD TYPING SEROLOGIC RH(D): CPT

## 2020-03-03 PROCEDURE — 81003 URINALYSIS AUTO W/O SCOPE: CPT

## 2020-03-03 PROCEDURE — 85025 COMPLETE CBC W/AUTO DIFF WBC: CPT

## 2020-03-03 PROCEDURE — 85610 PROTHROMBIN TIME: CPT

## 2020-03-03 PROCEDURE — 86900 BLOOD TYPING SEROLOGIC ABO: CPT

## 2020-03-03 PROCEDURE — 87641 MR-STAPH DNA AMP PROBE: CPT

## 2020-03-03 PROCEDURE — 84134 ASSAY OF PREALBUMIN: CPT

## 2020-03-03 PROCEDURE — 82306 VITAMIN D 25 HYDROXY: CPT

## 2020-03-03 PROCEDURE — 93005 ELECTROCARDIOGRAM TRACING: CPT | Performed by: ORTHOPAEDIC SURGERY

## 2020-03-03 PROCEDURE — 93010 ELECTROCARDIOGRAM REPORT: CPT | Performed by: INTERNAL MEDICINE

## 2020-03-03 PROCEDURE — 82040 ASSAY OF SERUM ALBUMIN: CPT

## 2020-03-04 LAB — MRSA SCREEN RT-PCR: NORMAL

## 2020-03-05 ENCOUNTER — TELEPHONE (OUTPATIENT)
Dept: ORTHOPEDIC SURGERY | Age: 60
End: 2020-03-05

## 2020-03-10 ENCOUNTER — TELEPHONE (OUTPATIENT)
Dept: ORTHOPEDIC SURGERY | Age: 60
End: 2020-03-10

## 2020-03-12 ENCOUNTER — OFFICE VISIT (OUTPATIENT)
Dept: ORTHOPEDIC SURGERY | Age: 60
End: 2020-03-12

## 2020-03-12 PROCEDURE — 99999 PR OFFICE/OUTPT VISIT,PROCEDURE ONLY: CPT | Performed by: ORTHOPAEDIC SURGERY

## 2020-03-12 NOTE — PROGRESS NOTES
Patient is a 60 y.o. female who is here to discuss surgical options regarding their painful joint. We went over the risks and complications of joint replacement including; bleeding, infection, decreased ROM, continued pain, instability, fracture, dislocation, neurovascular injury, post op cognitive disorder, leg length discrepancy, DVT, pulmonary embolism and need for further surgical procedures. We also discussed the choice of implant and the hardware model was reviewed with the patient. The patient understands these issues and we will see the patient in the operating room or in another pre operative visit. I had a 45 minute face-to-face group discussion with greater than 50% of the time counseling the patient regarding joint arthroplasty, post operative physical therapy, and pain management. There were no vitals taken for this visit. As this patient has demonstrated risk factors for osteoporosis, such as age greater than [de-identified] years and evidence of a fracture, I have referred the patient back to the primary care physician for evaluation for osteoporosis, including consideration for DEXA scanning, if this is felt to be clinically indicated. The patient is advised to contact the primary care physician to follow-up for further evaluation. Plan:   left sided total knee arthroplasty.: The date of surgery is tenatively scheduled on 3/18/20.

## 2020-03-13 ENCOUNTER — PREP FOR PROCEDURE (OUTPATIENT)
Dept: ORTHOPEDICS UNIT | Age: 60
End: 2020-03-13

## 2020-03-13 PROCEDURE — MISC60 ORTHOTIC REFURBISHMENT 60: Performed by: ORTHOPAEDIC SURGERY

## 2020-03-13 RX ORDER — CELECOXIB 200 MG/1
200 CAPSULE ORAL ONCE
Status: CANCELLED | OUTPATIENT
Start: 2020-03-13 | End: 2020-03-13

## 2020-03-16 ENCOUNTER — TELEPHONE (OUTPATIENT)
Dept: ORTHOPEDIC SURGERY | Age: 60
End: 2020-03-16

## 2020-03-16 NOTE — TELEPHONE ENCOUNTER
Faxed [facility] paperwork to [facility]. Faxed Signature page to [employer].

## 2020-03-17 ENCOUNTER — TELEPHONE (OUTPATIENT)
Dept: ORTHOPEDIC SURGERY | Age: 60
End: 2020-03-17

## 2020-03-30 ENCOUNTER — TELEPHONE (OUTPATIENT)
Dept: ORTHOPEDIC SURGERY | Age: 60
End: 2020-03-30

## 2020-05-12 ENCOUNTER — TELEPHONE (OUTPATIENT)
Dept: ORTHOPEDIC SURGERY | Age: 60
End: 2020-05-12

## 2020-05-14 ENCOUNTER — TELEPHONE (OUTPATIENT)
Dept: ORTHOPEDIC SURGERY | Age: 60
End: 2020-05-14

## 2020-05-14 ENCOUNTER — PREP FOR PROCEDURE (OUTPATIENT)
Dept: ORTHOPEDIC SURGERY | Age: 60
End: 2020-05-14

## 2020-05-15 DIAGNOSIS — M17.12 PRIMARY OSTEOARTHRITIS OF LEFT KNEE: ICD-10-CM

## 2020-05-15 LAB
ABO/RH: NORMAL
ALBUMIN SERPL-MCNC: 4.7 G/DL (ref 3.4–5)
ANION GAP SERPL CALCULATED.3IONS-SCNC: 11 MMOL/L (ref 3–16)
ANTIBODY SCREEN: NORMAL
APTT: 34.1 SEC (ref 24.2–36.2)
BASOPHILS ABSOLUTE: 0 K/UL (ref 0–0.2)
BASOPHILS RELATIVE PERCENT: 0.5 %
BILIRUBIN URINE: NEGATIVE
BLOOD, URINE: NEGATIVE
BUN BLDV-MCNC: 20 MG/DL (ref 7–20)
CALCIUM SERPL-MCNC: 9.3 MG/DL (ref 8.3–10.6)
CHLORIDE BLD-SCNC: 101 MMOL/L (ref 99–110)
CLARITY: CLEAR
CO2: 29 MMOL/L (ref 21–32)
COLOR: YELLOW
CREAT SERPL-MCNC: 0.6 MG/DL (ref 0.6–1.2)
EOSINOPHILS ABSOLUTE: 0.2 K/UL (ref 0–0.6)
EOSINOPHILS RELATIVE PERCENT: 2.8 %
ESTIMATED AVERAGE GLUCOSE: 114 MG/DL
GFR AFRICAN AMERICAN: >60
GFR NON-AFRICAN AMERICAN: >60
GLUCOSE BLD-MCNC: 100 MG/DL (ref 70–99)
GLUCOSE URINE: NEGATIVE MG/DL
HBA1C MFR BLD: 5.6 %
HCT VFR BLD CALC: 37.1 % (ref 36–48)
HEMOGLOBIN: 12.5 G/DL (ref 12–16)
INR BLD: 1.02 (ref 0.86–1.14)
KETONES, URINE: NEGATIVE MG/DL
LEUKOCYTE ESTERASE, URINE: NEGATIVE
LYMPHOCYTES ABSOLUTE: 2.5 K/UL (ref 1–5.1)
LYMPHOCYTES RELATIVE PERCENT: 36.6 %
MCH RBC QN AUTO: 30 PG (ref 26–34)
MCHC RBC AUTO-ENTMCNC: 33.7 G/DL (ref 31–36)
MCV RBC AUTO: 89.2 FL (ref 80–100)
MICROSCOPIC EXAMINATION: NORMAL
MONOCYTES ABSOLUTE: 0.5 K/UL (ref 0–1.3)
MONOCYTES RELATIVE PERCENT: 7.1 %
NEUTROPHILS ABSOLUTE: 3.6 K/UL (ref 1.7–7.7)
NEUTROPHILS RELATIVE PERCENT: 53 %
NITRITE, URINE: NEGATIVE
PDW BLD-RTO: 13.7 % (ref 12.4–15.4)
PH UA: 7 (ref 5–8)
PLATELET # BLD: 268 K/UL (ref 135–450)
PMV BLD AUTO: 9.9 FL (ref 5–10.5)
POTASSIUM SERPL-SCNC: 3.6 MMOL/L (ref 3.5–5.1)
PREALBUMIN: 32.4 MG/DL (ref 20–40)
PROTEIN UA: NEGATIVE MG/DL
PROTHROMBIN TIME: 11.8 SEC (ref 10–13.2)
RBC # BLD: 4.16 M/UL (ref 4–5.2)
SEDIMENTATION RATE, ERYTHROCYTE: 10 MM/HR (ref 0–30)
SODIUM BLD-SCNC: 141 MMOL/L (ref 136–145)
SPECIFIC GRAVITY UA: 1.01 (ref 1–1.03)
URINE REFLEX TO CULTURE: NORMAL
URINE TYPE: NORMAL
UROBILINOGEN, URINE: 0.2 E.U./DL
VITAMIN D 25-HYDROXY: 29.5 NG/ML
WBC # BLD: 6.8 K/UL (ref 4–11)

## 2020-05-18 ENCOUNTER — HOSPITAL ENCOUNTER (OUTPATIENT)
Age: 60
Discharge: HOME | End: 2020-05-18
Payer: COMMERCIAL

## 2020-05-18 PROCEDURE — 87641 MR-STAPH DNA AMP PROBE: CPT

## 2020-05-19 LAB — MRSA SCREEN RT-PCR: NORMAL

## 2020-05-20 ENCOUNTER — OFFICE VISIT (OUTPATIENT)
Dept: FAMILY MEDICINE CLINIC | Age: 60
End: 2020-05-20
Payer: COMMERCIAL

## 2020-05-20 VITALS
HEIGHT: 62 IN | RESPIRATION RATE: 18 BRPM | OXYGEN SATURATION: 97 % | HEART RATE: 103 BPM | SYSTOLIC BLOOD PRESSURE: 120 MMHG | DIASTOLIC BLOOD PRESSURE: 76 MMHG | TEMPERATURE: 97.3 F | WEIGHT: 156.4 LBS | BODY MASS INDEX: 28.78 KG/M2

## 2020-05-20 PROCEDURE — 99242 OFF/OP CONSLTJ NEW/EST SF 20: CPT | Performed by: FAMILY MEDICINE

## 2020-05-20 ASSESSMENT — ENCOUNTER SYMPTOMS
RESPIRATORY NEGATIVE: 1
GASTROINTESTINAL NEGATIVE: 1

## 2020-05-20 NOTE — PROGRESS NOTES
Subjective:      Patient ID: Niesha Christopher is a 60 y.o. female. HPI   Preop  5/26/20    Left knee replacement    Dr Brooks Kim  Failed conservative treatment   Increasing pain and loss of mobility    Review of Systems   Constitutional: Negative. HENT: Negative. Respiratory: Negative. Cardiovascular: Negative. Gastrointestinal: Negative. Musculoskeletal: Positive for arthralgias, gait problem, joint swelling and myalgias. Psychiatric/Behavioral: Positive for decreased concentration. YOB: 1960    Date of Visit:  5/20/2020    Allergies   Allergen Reactions    Hydrocodone-Acetaminophen Shortness Of Breath    Vicodin [Hydrocodone-Acetaminophen] Shortness Of Breath    Lisinopril      cough    Sansert [Methysergide]      Extreme weakness    Toradol [Ketorolac Tromethamine] Nausea Only       Outpatient Medications Marked as Taking for the 5/20/20 encounter (Office Visit) with Mitul Davis DO   Medication Sig Dispense Refill    Armodafinil (NUVIGIL) 150 MG TABS tablet one tab QAM prn 30 tablet 5    losartan-hydrochlorothiazide (HYZAAR) 100-12.5 MG per tablet TAKE 1 TABLET BY MOUTH ONCE DAILY 90 tablet 3    amitriptyline (ELAVIL) 25 MG tablet Take 1 tablet by mouth nightly 90 tablet 3    atorvastatin (LIPITOR) 80 MG tablet TAKE 1 TABLET BY MOUTH ONCE DAILY 90 tablet 3    cyclobenzaprine (FLEXERIL) 5 MG tablet Take 1 tablet by mouth daily 90 tablet 3    fluticasone (FLONASE) 50 MCG/ACT nasal spray 1 spray by Nasal route daily as needed          Vitals:    05/20/20 1056   BP: 120/76   Site: Left Upper Arm   Position: Sitting   Cuff Size: Medium Adult   Pulse: 103   Resp: 18   Temp: 97.3 °F (36.3 °C)   TempSrc: Oral   SpO2: 97%   Weight: 156 lb 6.4 oz (70.9 kg)   Height: 5' 2\" (1.575 m)     Body mass index is 28.61 kg/m².      Wt Readings from Last 3 Encounters:   05/20/20 156 lb 6.4 oz (70.9 kg)   03/02/20 149 lb 6.4 oz (67.8 kg)   02/26/20 147 lb (66.7 kg)     BP

## 2020-05-20 NOTE — PATIENT INSTRUCTIONS
Patient Education        Learning About Vitamin D  Why is it important to get enough vitamin D? Your body needs vitamin D to absorb calcium. Calcium keeps your bones and muscles, including your heart, healthy and strong. If your muscles don't get enough calcium, they can cramp, hurt, or feel weak. You may have long-term (chronic) muscle aches and pains. If you don't get enough vitamin D throughout life, you have an increased chance of having thin and brittle bones (osteoporosis) in your later years. Children who don't get enough vitamin D may not grow as much as others their age. They also have a chance of getting a rare disease called rickets. It causes weak bones. Vitamin D and calcium are added to many foods. And your body uses sunshine to make its own vitamin D. How much vitamin D do you need? The [place] [employer] recommends that people ages 3 through 79 get 600 IU (international units) every day. Adults 71 and older need 800 IU every day. Blood tests for vitamin D can check your vitamin D level. But there is no standard normal range used by all laboratories. You're likely getting enough vitamin D if your levels are in the range of 20 to 50 ng/mL. How can you get more vitamin D? Foods that contain vitamin D include:  · Mars, tuna, and mackerel. These are some of the best foods to eat when you need to get more vitamin D.  · Cheese, egg yolks, and beef liver. These foods have vitamin D in small amounts. · Milk, soy drinks, orange juice, yogurt, margarine, and some kinds of cereal have vitamin D added to them. Some people don't make vitamin D as well as others. They may have to take extra care in getting enough vitamin D. Things that reduce how much vitamin D your body makes include:  · Dark skin, such as many  Americans have. · Age, especially if you are older than 72. · Digestive problems, such as Crohn's or celiac disease. · Liver and kidney disease.   Some people who do not get

## 2020-05-21 ENCOUNTER — OFFICE VISIT (OUTPATIENT)
Dept: PRIMARY CARE CLINIC | Age: 60
End: 2020-05-21

## 2020-05-21 NOTE — PROGRESS NOTES
Patient presented to [facility] drive up clinic for preop testing. Patient was swabbed and given information advising them to remain isolated until procedure date.

## 2020-05-22 ENCOUNTER — ANESTHESIA EVENT (OUTPATIENT)
Dept: OPERATING ROOM | Age: 60
End: 2020-05-22
Payer: COMMERCIAL

## 2020-05-24 LAB
SARS-COV-2: NOT DETECTED
SOURCE: NORMAL

## 2020-05-26 ENCOUNTER — APPOINTMENT (OUTPATIENT)
Dept: GENERAL RADIOLOGY | Age: 60
End: 2020-05-26
Attending: ORTHOPAEDIC SURGERY
Payer: COMMERCIAL

## 2020-05-26 ENCOUNTER — ANESTHESIA (OUTPATIENT)
Dept: OPERATING ROOM | Age: 60
End: 2020-05-26
Payer: COMMERCIAL

## 2020-05-26 ENCOUNTER — HOSPITAL ENCOUNTER (OUTPATIENT)
Age: 60
Setting detail: OUTPATIENT SURGERY
Discharge: HOME | End: 2020-05-26
Attending: ORTHOPAEDIC SURGERY | Admitting: ORTHOPAEDIC SURGERY
Payer: COMMERCIAL

## 2020-05-26 VITALS
DIASTOLIC BLOOD PRESSURE: 55 MMHG | BODY MASS INDEX: 27.2 KG/M2 | RESPIRATION RATE: 18 BRPM | OXYGEN SATURATION: 96 % | HEIGHT: 62 IN | TEMPERATURE: 97.8 F | WEIGHT: 147.82 LBS | SYSTOLIC BLOOD PRESSURE: 106 MMHG | HEART RATE: 81 BPM

## 2020-05-26 VITALS
SYSTOLIC BLOOD PRESSURE: 103 MMHG | RESPIRATION RATE: 11 BRPM | OXYGEN SATURATION: 96 % | DIASTOLIC BLOOD PRESSURE: 53 MMHG | TEMPERATURE: 97.9 F

## 2020-05-26 LAB
ABO/RH: NORMAL
ANTIBODY SCREEN: NORMAL
GLUCOSE BLD-MCNC: 102 MG/DL (ref 70–99)
PERFORMED ON: ABNORMAL

## 2020-05-26 PROCEDURE — 88305 TISSUE EXAM BY PATHOLOGIST: CPT

## 2020-05-26 PROCEDURE — C1713 ANCHOR/SCREW BN/BN,TIS/BN: HCPCS | Performed by: ORTHOPAEDIC SURGERY

## 2020-05-26 PROCEDURE — 2580000003 HC RX 258: Performed by: NURSE PRACTITIONER

## 2020-05-26 PROCEDURE — 2580000003 HC RX 258: Performed by: ORTHOPAEDIC SURGERY

## 2020-05-26 PROCEDURE — 2500000003 HC RX 250 WO HCPCS: Performed by: NURSE ANESTHETIST, CERTIFIED REGISTERED

## 2020-05-26 PROCEDURE — 7100000000 HC PACU RECOVERY - FIRST 15 MIN: Performed by: ORTHOPAEDIC SURGERY

## 2020-05-26 PROCEDURE — 2580000003 HC RX 258: Performed by: ANESTHESIOLOGY

## 2020-05-26 PROCEDURE — 6360000002 HC RX W HCPCS: Performed by: NURSE PRACTITIONER

## 2020-05-26 PROCEDURE — 2500000003 HC RX 250 WO HCPCS: Performed by: NURSE PRACTITIONER

## 2020-05-26 PROCEDURE — C1776 JOINT DEVICE (IMPLANTABLE): HCPCS | Performed by: ORTHOPAEDIC SURGERY

## 2020-05-26 PROCEDURE — C9290 INJ, BUPIVACAINE LIPOSOME: HCPCS | Performed by: ORTHOPAEDIC SURGERY

## 2020-05-26 PROCEDURE — 3700000000 HC ANESTHESIA ATTENDED CARE: Performed by: ORTHOPAEDIC SURGERY

## 2020-05-26 PROCEDURE — 6370000000 HC RX 637 (ALT 250 FOR IP): Performed by: ORTHOPAEDIC SURGERY

## 2020-05-26 PROCEDURE — 2709999900 HC NON-CHARGEABLE SUPPLY: Performed by: ORTHOPAEDIC SURGERY

## 2020-05-26 PROCEDURE — 86850 RBC ANTIBODY SCREEN: CPT

## 2020-05-26 PROCEDURE — 7100000001 HC PACU RECOVERY - ADDTL 15 MIN: Performed by: ORTHOPAEDIC SURGERY

## 2020-05-26 PROCEDURE — 6360000002 HC RX W HCPCS: Performed by: ANESTHESIOLOGY

## 2020-05-26 PROCEDURE — 86900 BLOOD TYPING SEROLOGIC ABO: CPT

## 2020-05-26 PROCEDURE — 86901 BLOOD TYPING SEROLOGIC RH(D): CPT

## 2020-05-26 PROCEDURE — 6360000002 HC RX W HCPCS: Performed by: ORTHOPAEDIC SURGERY

## 2020-05-26 PROCEDURE — 7100000011 HC PHASE II RECOVERY - ADDTL 15 MIN: Performed by: ORTHOPAEDIC SURGERY

## 2020-05-26 PROCEDURE — 73560 X-RAY EXAM OF KNEE 1 OR 2: CPT

## 2020-05-26 PROCEDURE — 2720000010 HC SURG SUPPLY STERILE: Performed by: ORTHOPAEDIC SURGERY

## 2020-05-26 PROCEDURE — 2500000003 HC RX 250 WO HCPCS: Performed by: ORTHOPAEDIC SURGERY

## 2020-05-26 PROCEDURE — 6360000002 HC RX W HCPCS: Performed by: NURSE ANESTHETIST, CERTIFIED REGISTERED

## 2020-05-26 PROCEDURE — 3600000005 HC SURGERY LEVEL 5 BASE: Performed by: ORTHOPAEDIC SURGERY

## 2020-05-26 PROCEDURE — 3600000015 HC SURGERY LEVEL 5 ADDTL 15MIN: Performed by: ORTHOPAEDIC SURGERY

## 2020-05-26 PROCEDURE — 3700000001 HC ADD 15 MINUTES (ANESTHESIA): Performed by: ORTHOPAEDIC SURGERY

## 2020-05-26 PROCEDURE — 6370000000 HC RX 637 (ALT 250 FOR IP): Performed by: NURSE PRACTITIONER

## 2020-05-26 PROCEDURE — 88311 DECALCIFY TISSUE: CPT

## 2020-05-26 PROCEDURE — 2500000003 HC RX 250 WO HCPCS

## 2020-05-26 PROCEDURE — 7100000010 HC PHASE II RECOVERY - FIRST 15 MIN: Performed by: ORTHOPAEDIC SURGERY

## 2020-05-26 DEVICE — INSERT TIB CS 2 10 MM ARTC POST KNEE BEAR TECHNOLOGY X3: Type: IMPLANTABLE DEVICE | Site: KNEE | Status: FUNCTIONAL

## 2020-05-26 DEVICE — BASEPLATE TIB SZ 2 AP42MM ML64MM KNEE TRITANIUM 4 CRUCFRM: Type: IMPLANTABLE DEVICE | Site: KNEE | Status: FUNCTIONAL

## 2020-05-26 DEVICE — COMPONENT PAT DIA31MM THK9MM KNEE TRITANIUM MTL BK: Type: IMPLANTABLE DEVICE | Site: KNEE | Status: FUNCTIONAL

## 2020-05-26 DEVICE — IMPLANTABLE DEVICE: Type: IMPLANTABLE DEVICE | Site: KNEE | Status: FUNCTIONAL

## 2020-05-26 RX ORDER — MIDAZOLAM HYDROCHLORIDE 1 MG/ML
INJECTION INTRAMUSCULAR; INTRAVENOUS PRN
Status: DISCONTINUED | OUTPATIENT
Start: 2020-05-26 | End: 2020-05-26 | Stop reason: SDUPTHER

## 2020-05-26 RX ORDER — ASPIRIN 81 MG/1
81 TABLET ORAL 2 TIMES DAILY
Qty: 28 TABLET | Refills: 0 | Status: SHIPPED | OUTPATIENT
Start: 2020-05-26 | End: 2020-11-23

## 2020-05-26 RX ORDER — PROPOFOL 10 MG/ML
INJECTION, EMULSION INTRAVENOUS PRN
Status: DISCONTINUED | OUTPATIENT
Start: 2020-05-26 | End: 2020-05-26 | Stop reason: SDUPTHER

## 2020-05-26 RX ORDER — OXYCODONE HYDROCHLORIDE 10 MG/1
5-10 TABLET ORAL EVERY 4 HOURS PRN
Qty: 40 TABLET | Refills: 0 | Status: SHIPPED | OUTPATIENT
Start: 2020-05-26 | End: 2020-05-26

## 2020-05-26 RX ORDER — CELECOXIB 200 MG/1
200 CAPSULE ORAL ONCE
Status: COMPLETED | OUTPATIENT
Start: 2020-05-26 | End: 2020-05-26

## 2020-05-26 RX ORDER — MEPERIDINE HYDROCHLORIDE 25 MG/ML
12.5 INJECTION INTRAMUSCULAR; INTRAVENOUS; SUBCUTANEOUS EVERY 5 MIN PRN
Status: DISCONTINUED | OUTPATIENT
Start: 2020-05-26 | End: 2020-05-26 | Stop reason: HOSPADM

## 2020-05-26 RX ORDER — OXYCODONE HYDROCHLORIDE 5 MG/1
10 TABLET ORAL EVERY 4 HOURS PRN
Status: DISCONTINUED | OUTPATIENT
Start: 2020-05-26 | End: 2020-05-26 | Stop reason: HOSPADM

## 2020-05-26 RX ORDER — ONDANSETRON 2 MG/ML
INJECTION INTRAMUSCULAR; INTRAVENOUS PRN
Status: DISCONTINUED | OUTPATIENT
Start: 2020-05-26 | End: 2020-05-26 | Stop reason: SDUPTHER

## 2020-05-26 RX ORDER — OXYCODONE HYDROCHLORIDE 5 MG/1
5-10 TABLET ORAL EVERY 4 HOURS PRN
Qty: 40 TABLET | Refills: 0 | Status: SHIPPED | OUTPATIENT
Start: 2020-05-26 | End: 2020-06-02

## 2020-05-26 RX ORDER — ROCURONIUM BROMIDE 10 MG/ML
INJECTION, SOLUTION INTRAVENOUS PRN
Status: DISCONTINUED | OUTPATIENT
Start: 2020-05-26 | End: 2020-05-26 | Stop reason: SDUPTHER

## 2020-05-26 RX ORDER — OXYCODONE HYDROCHLORIDE 5 MG/1
5 TABLET ORAL EVERY 4 HOURS PRN
Status: DISCONTINUED | OUTPATIENT
Start: 2020-05-26 | End: 2020-05-26 | Stop reason: HOSPADM

## 2020-05-26 RX ORDER — ACETAMINOPHEN 500 MG
1000 TABLET ORAL ONCE
Status: COMPLETED | OUTPATIENT
Start: 2020-05-26 | End: 2020-05-26

## 2020-05-26 RX ORDER — SODIUM CHLORIDE 9 MG/ML
INJECTION, SOLUTION INTRAVENOUS CONTINUOUS
Status: DISCONTINUED | OUTPATIENT
Start: 2020-05-26 | End: 2020-05-26 | Stop reason: HOSPADM

## 2020-05-26 RX ORDER — SUCCINYLCHOLINE CHLORIDE 20 MG/ML
INJECTION INTRAMUSCULAR; INTRAVENOUS PRN
Status: DISCONTINUED | OUTPATIENT
Start: 2020-05-26 | End: 2020-05-26 | Stop reason: SDUPTHER

## 2020-05-26 RX ORDER — APREPITANT 40 MG/1
40 CAPSULE ORAL ONCE
Status: COMPLETED | OUTPATIENT
Start: 2020-05-26 | End: 2020-05-26

## 2020-05-26 RX ORDER — SODIUM CHLORIDE 0.9 % (FLUSH) 0.9 %
10 SYRINGE (ML) INJECTION EVERY 12 HOURS SCHEDULED
Status: DISCONTINUED | OUTPATIENT
Start: 2020-05-26 | End: 2020-05-26 | Stop reason: HOSPADM

## 2020-05-26 RX ORDER — ONDANSETRON 2 MG/ML
4 INJECTION INTRAMUSCULAR; INTRAVENOUS
Status: DISCONTINUED | OUTPATIENT
Start: 2020-05-26 | End: 2020-05-26 | Stop reason: HOSPADM

## 2020-05-26 RX ORDER — MORPHINE SULFATE 2 MG/ML
1 INJECTION, SOLUTION INTRAMUSCULAR; INTRAVENOUS EVERY 5 MIN PRN
Status: DISCONTINUED | OUTPATIENT
Start: 2020-05-26 | End: 2020-05-26 | Stop reason: HOSPADM

## 2020-05-26 RX ORDER — LIDOCAINE HYDROCHLORIDE 20 MG/ML
INJECTION, SOLUTION EPIDURAL; INFILTRATION; INTRACAUDAL; PERINEURAL PRN
Status: DISCONTINUED | OUTPATIENT
Start: 2020-05-26 | End: 2020-05-26 | Stop reason: SDUPTHER

## 2020-05-26 RX ORDER — DEXAMETHASONE SODIUM PHOSPHATE 4 MG/ML
INJECTION, SOLUTION INTRA-ARTICULAR; INTRALESIONAL; INTRAMUSCULAR; INTRAVENOUS; SOFT TISSUE PRN
Status: DISCONTINUED | OUTPATIENT
Start: 2020-05-26 | End: 2020-05-26 | Stop reason: SDUPTHER

## 2020-05-26 RX ORDER — VANCOMYCIN HYDROCHLORIDE 1 G/20ML
INJECTION, POWDER, LYOPHILIZED, FOR SOLUTION INTRAVENOUS
Status: COMPLETED | OUTPATIENT
Start: 2020-05-26 | End: 2020-05-26

## 2020-05-26 RX ORDER — FENTANYL CITRATE 50 UG/ML
INJECTION, SOLUTION INTRAMUSCULAR; INTRAVENOUS PRN
Status: DISCONTINUED | OUTPATIENT
Start: 2020-05-26 | End: 2020-05-26 | Stop reason: SDUPTHER

## 2020-05-26 RX ORDER — FENTANYL CITRATE 50 UG/ML
50 INJECTION, SOLUTION INTRAMUSCULAR; INTRAVENOUS EVERY 5 MIN PRN
Status: DISCONTINUED | OUTPATIENT
Start: 2020-05-26 | End: 2020-05-26 | Stop reason: HOSPADM

## 2020-05-26 RX ORDER — FENTANYL CITRATE 50 UG/ML
25 INJECTION, SOLUTION INTRAMUSCULAR; INTRAVENOUS EVERY 5 MIN PRN
Status: DISCONTINUED | OUTPATIENT
Start: 2020-05-26 | End: 2020-05-26 | Stop reason: HOSPADM

## 2020-05-26 RX ORDER — CEPHALEXIN 500 MG/1
500 CAPSULE ORAL SEE ADMIN INSTRUCTIONS
Qty: 2 CAPSULE | Refills: 0 | Status: SHIPPED | OUTPATIENT
Start: 2020-05-26 | End: 2020-06-11 | Stop reason: ALTCHOICE

## 2020-05-26 RX ORDER — SODIUM CHLORIDE 0.9 % (FLUSH) 0.9 %
10 SYRINGE (ML) INJECTION PRN
Status: DISCONTINUED | OUTPATIENT
Start: 2020-05-26 | End: 2020-05-26 | Stop reason: HOSPADM

## 2020-05-26 RX ADMIN — TRANEXAMIC ACID 1000 MG: 100 INJECTION, SOLUTION INTRAVENOUS at 15:45

## 2020-05-26 RX ADMIN — SUCCINYLCHOLINE CHLORIDE 100 MG: 20 INJECTION, SOLUTION INTRAMUSCULAR; INTRAVENOUS at 10:03

## 2020-05-26 RX ADMIN — CELECOXIB 200 MG: 200 CAPSULE ORAL at 08:18

## 2020-05-26 RX ADMIN — CEFAZOLIN 2 G: 10 INJECTION, POWDER, FOR SOLUTION INTRAVENOUS at 09:56

## 2020-05-26 RX ADMIN — ROCURONIUM BROMIDE 30 MG: 10 INJECTION INTRAVENOUS at 10:12

## 2020-05-26 RX ADMIN — CEFAZOLIN 2 G: 1 INJECTION, POWDER, FOR SOLUTION INTRAMUSCULAR; INTRAVENOUS at 14:57

## 2020-05-26 RX ADMIN — APREPITANT 40 MG: 40 CAPSULE ORAL at 08:18

## 2020-05-26 RX ADMIN — FENTANYL CITRATE 50 MCG: 50 INJECTION, SOLUTION INTRAMUSCULAR; INTRAVENOUS at 12:35

## 2020-05-26 RX ADMIN — PROPOFOL 160 MG: 10 INJECTION, EMULSION INTRAVENOUS at 10:02

## 2020-05-26 RX ADMIN — ACETAMINOPHEN 1000 MG: 500 TABLET ORAL at 13:55

## 2020-05-26 RX ADMIN — SODIUM CHLORIDE: 9 INJECTION, SOLUTION INTRAVENOUS at 09:57

## 2020-05-26 RX ADMIN — TRANEXAMIC ACID 1 G: 100 INJECTION, SOLUTION INTRAVENOUS at 10:07

## 2020-05-26 RX ADMIN — SODIUM CHLORIDE: 9 INJECTION, SOLUTION INTRAVENOUS at 08:18

## 2020-05-26 RX ADMIN — FENTANYL CITRATE 50 MCG: 50 INJECTION, SOLUTION INTRAMUSCULAR; INTRAVENOUS at 11:51

## 2020-05-26 RX ADMIN — DEXAMETHASONE SODIUM PHOSPHATE 10 MG: 4 INJECTION, SOLUTION INTRAMUSCULAR; INTRAVENOUS at 10:13

## 2020-05-26 RX ADMIN — LIDOCAINE HYDROCHLORIDE 100 MG: 20 INJECTION, SOLUTION EPIDURAL; INFILTRATION; INTRACAUDAL; PERINEURAL at 09:57

## 2020-05-26 RX ADMIN — FENTANYL CITRATE 50 MCG: 50 INJECTION INTRAMUSCULAR; INTRAVENOUS at 10:00

## 2020-05-26 RX ADMIN — FENTANYL CITRATE 50 MCG: 50 INJECTION INTRAMUSCULAR; INTRAVENOUS at 10:24

## 2020-05-26 RX ADMIN — SUGAMMADEX 150 MG: 100 INJECTION, SOLUTION INTRAVENOUS at 11:11

## 2020-05-26 RX ADMIN — ONDANSETRON 4 MG: 2 INJECTION INTRAMUSCULAR; INTRAVENOUS at 11:15

## 2020-05-26 RX ADMIN — MIDAZOLAM 2 MG: 1 INJECTION INTRAMUSCULAR; INTRAVENOUS at 09:56

## 2020-05-26 RX ADMIN — OXYCODONE HYDROCHLORIDE 10 MG: 5 TABLET ORAL at 13:55

## 2020-05-26 ASSESSMENT — PULMONARY FUNCTION TESTS
PIF_VALUE: 20
PIF_VALUE: 20
PIF_VALUE: 18
PIF_VALUE: 18
PIF_VALUE: 19
PIF_VALUE: 20
PIF_VALUE: 19
PIF_VALUE: 18
PIF_VALUE: 1
PIF_VALUE: 20
PIF_VALUE: 18
PIF_VALUE: 0
PIF_VALUE: 21
PIF_VALUE: 18
PIF_VALUE: 20
PIF_VALUE: 19
PIF_VALUE: 21
PIF_VALUE: 20
PIF_VALUE: 23
PIF_VALUE: 20
PIF_VALUE: 19
PIF_VALUE: 5
PIF_VALUE: 12
PIF_VALUE: 19
PIF_VALUE: 18
PIF_VALUE: 19
PIF_VALUE: 20
PIF_VALUE: 20
PIF_VALUE: 21
PIF_VALUE: 1
PIF_VALUE: 19
PIF_VALUE: 20
PIF_VALUE: 19
PIF_VALUE: 19
PIF_VALUE: 15
PIF_VALUE: 1
PIF_VALUE: 18
PIF_VALUE: 20
PIF_VALUE: 19
PIF_VALUE: 14
PIF_VALUE: 20
PIF_VALUE: 20
PIF_VALUE: 18
PIF_VALUE: 20
PIF_VALUE: 2
PIF_VALUE: 18
PIF_VALUE: 11
PIF_VALUE: 20
PIF_VALUE: 19
PIF_VALUE: 21
PIF_VALUE: 20
PIF_VALUE: 2
PIF_VALUE: 15
PIF_VALUE: 19
PIF_VALUE: 1
PIF_VALUE: 20
PIF_VALUE: 18
PIF_VALUE: 18
PIF_VALUE: 20
PIF_VALUE: 19
PIF_VALUE: 18
PIF_VALUE: 4
PIF_VALUE: 19
PIF_VALUE: 19
PIF_VALUE: 21
PIF_VALUE: 19
PIF_VALUE: 8
PIF_VALUE: 3
PIF_VALUE: 21
PIF_VALUE: 2
PIF_VALUE: 19
PIF_VALUE: 18
PIF_VALUE: 18
PIF_VALUE: 19
PIF_VALUE: 19
PIF_VALUE: 3
PIF_VALUE: 20
PIF_VALUE: 20
PIF_VALUE: 15
PIF_VALUE: 19
PIF_VALUE: 20

## 2020-05-26 ASSESSMENT — PAIN DESCRIPTION - LOCATION
LOCATION: KNEE

## 2020-05-26 ASSESSMENT — PAIN DESCRIPTION - ORIENTATION
ORIENTATION: LEFT

## 2020-05-26 ASSESSMENT — PAIN DESCRIPTION - PAIN TYPE
TYPE: SURGICAL PAIN

## 2020-05-26 ASSESSMENT — PAIN DESCRIPTION - PROGRESSION
CLINICAL_PROGRESSION: GRADUALLY IMPROVING
CLINICAL_PROGRESSION: GRADUALLY IMPROVING
CLINICAL_PROGRESSION: GRADUALLY WORSENING
CLINICAL_PROGRESSION: NOT CHANGED
CLINICAL_PROGRESSION: NOT CHANGED
CLINICAL_PROGRESSION: GRADUALLY WORSENING
CLINICAL_PROGRESSION: GRADUALLY IMPROVING
CLINICAL_PROGRESSION: NOT CHANGED

## 2020-05-26 ASSESSMENT — PAIN DESCRIPTION - ONSET
ONSET: ON-GOING
ONSET: AWAKENED FROM SLEEP
ONSET: ON-GOING

## 2020-05-26 ASSESSMENT — PAIN SCALES - GENERAL
PAINLEVEL_OUTOF10: 6
PAINLEVEL_OUTOF10: 8
PAINLEVEL_OUTOF10: 7
PAINLEVEL_OUTOF10: 8
PAINLEVEL_OUTOF10: 4
PAINLEVEL_OUTOF10: 7

## 2020-05-26 ASSESSMENT — PAIN - FUNCTIONAL ASSESSMENT
PAIN_FUNCTIONAL_ASSESSMENT: PREVENTS OR INTERFERES SOME ACTIVE ACTIVITIES AND ADLS
PAIN_FUNCTIONAL_ASSESSMENT: PREVENTS OR INTERFERES WITH ALL ACTIVE AND SOME PASSIVE ACTIVITIES
PAIN_FUNCTIONAL_ASSESSMENT: PREVENTS OR INTERFERES WITH ALL ACTIVE AND SOME PASSIVE ACTIVITIES
PAIN_FUNCTIONAL_ASSESSMENT: PREVENTS OR INTERFERES SOME ACTIVE ACTIVITIES AND ADLS
PAIN_FUNCTIONAL_ASSESSMENT: 0-10
PAIN_FUNCTIONAL_ASSESSMENT: PREVENTS OR INTERFERES WITH ALL ACTIVE AND SOME PASSIVE ACTIVITIES
PAIN_FUNCTIONAL_ASSESSMENT: PREVENTS OR INTERFERES SOME ACTIVE ACTIVITIES AND ADLS

## 2020-05-26 ASSESSMENT — LIFESTYLE VARIABLES: SMOKING_STATUS: 0

## 2020-05-26 ASSESSMENT — PAIN DESCRIPTION - FREQUENCY
FREQUENCY: CONTINUOUS

## 2020-05-26 ASSESSMENT — PAIN DESCRIPTION - DESCRIPTORS
DESCRIPTORS: ACHING

## 2020-05-26 ASSESSMENT — ENCOUNTER SYMPTOMS: SHORTNESS OF BREATH: 0

## 2020-05-26 NOTE — DISCHARGE INSTR - COC
limits on your diet due to your surgery.  Pain pills and activity changes may lead to constipation. To prevent this, use prune juice or bran cereals liberally. You may need to use a laxative such as Dulcolax, Senokot, or Milk of Magnesia.  Drink plenty of fluids. Medications:   Take pain pills if needed to maintain comfort.  Never drive while taking pain medicine.  Avoid over the counter medications until checking with your doctor.  Resume previous medications as instructed by your doctor. You will be on aspirin or Eliquis  for 14 days only. Stay off other anti-inflammatory medications (except Celebrex)  Call Your Doctor If:  [facility] You have increased pain not controlled by medications.  Excessive swelling in your ankle.  You develop numbness, tingling, or decreased movement.  You have a fever greater than 100 degrees for a day or over 101 degrees at any one time.  Your wound becomes more reddened, starts draining, or opens.  If you fall. You have any questions about your recovery. Inform your family doctor/dentist or any other doctor who cares for you in the future that you have a joint replacement. You may need antibiotics for dental or surgical procedures if there is any evidence of infection present. ? If you have required the use of insulin to control your blood sugar after surgery, follow up with your family doctor. ? Call your surgeons office to schedule your appointment to be seen after surgery. ? Make your appointment to continue physical therapy per doctors orders. ?  Smoking cessation assistance can be obtained from your family doctor or by calling [place] @ [phone]    _______________________________   _____   _______________________  ____                Patient Signature              Date      Witness                               Date

## 2020-05-26 NOTE — PROGRESS NOTES
To pacu from OR. Pt asleep with oral airway in place. Dressing to left knee dry and intact. Left pedal pulse palpable. Ice cuff on and in use. Monitor in sinus rhythm.

## 2020-05-26 NOTE — PROGRESS NOTES
Received from PACU. Admitted to Phase 2 care. Awake and alert, respirations easy and even. Oriented to room and surroundings. Continues with complaints. Of left knee pain.

## 2020-05-26 NOTE — PROGRESS NOTES
Tolerating oral intake and being up in chair. Ace wrap removed by Mansi, CNP and dry dressing applied. States pain is tolerable. No complaints.

## 2020-05-26 NOTE — ANESTHESIA PRE PROCEDURE
Shortness Of Breath    Lisinopril      cough    Sansert [Methysergide]      Extreme weakness    Toradol [Ketorolac Tromethamine] Nausea Only       Problem List:    Patient Active Problem List   Diagnosis Code    [facility] DJD(carpometacarpal degenerative joint disease), localized primary M19.049    Fibromyalgia M79.7    Essential hypertension I10    Mixed hyperlipidemia E78.2    Dysthymia F34.1    Metatarsalgia of right foot M77.41       Past Medical History:        Diagnosis Date    HBP (high blood pressure)     Hyperlipidemia     Obstructive sleep apnea on CPAP     Plantar fasciitis     Seasonal allergies        Past Surgical History:        Procedure Laterality Date    CARPAL TUNNEL RELEASE Right      SECTION  1621,2485, ,     COLONOSCOPY  2018    Dr. Teresita Arnett with polyp    FOOT NEUROMA SURGERY Right     HAND SURGERY Left     Thumb joint    HYSTERECTOMY      LASIK      SHOULDER SURGERY      SPINAL FUSION      L4       Social History:    Social History     Tobacco Use    Smoking status: Never Smoker    Smokeless tobacco: Never Used   Substance Use Topics    Alcohol use: Yes     Comment: monthly, occasional drink                                Counseling given: Not Answered      Vital Signs (Current):   Vitals:    20 1230 20 0745   BP:  131/72   Pulse:  87   Resp:  15   Temp:  97.1 °F (36.2 °C)   TempSrc:  Temporal   SpO2:  98%   Weight: 156 lb (70.8 kg) 147 lb 13.1 oz (67 kg)   Height: 5' 2\" (1.575 m) 5' 2\" (1.575 m)                                              BP Readings from Last 3 Encounters:   20 131/72   20 120/76   20 126/80       NPO Status: Time of last liquid consumption: 0000                        Time of last solid consumption: 2330                        Date of last liquid consumption: 20                        Date of last solid food consumption: 20    BMI:   Wt Readings from Last 3 Encounters:   20 147 lb 13.1 oz (67 kg)   05/20/20 156 lb 6.4 oz (70.9 kg)   03/02/20 149 lb 6.4 oz (67.8 kg)     Body mass index is 27.04 kg/m². CBC:   Lab Results   Component Value Date    WBC 6.8 05/15/2020    RBC 4.16 05/15/2020    HGB 12.5 05/15/2020    HCT 37.1 05/15/2020    MCV 89.2 05/15/2020    RDW 13.7 05/15/2020     05/15/2020       CMP:   Lab Results   Component Value Date     05/15/2020    K 3.6 05/15/2020     05/15/2020    CO2 29 05/15/2020    BUN 20 05/15/2020    CREATININE 0.6 05/15/2020    GFRAA >60 05/15/2020    LABGLOM >60 05/15/2020    GLUCOSE 100 05/15/2020    CALCIUM 9.3 05/15/2020       POC Tests: No results for input(s): POCGLU, POCNA, POCK, POCCL, POCBUN, POCHEMO, POCHCT in the last 72 hours.     Coags:   Lab Results   Component Value Date    PROTIME 11.8 05/15/2020    INR 1.02 05/15/2020    APTT 34.1 05/15/2020       HCG (If Applicable): No results found for: PREGTESTUR, PREGSERUM, HCG, HCGQUANT     ABGs: No results found for: PHART, PO2ART, XYO5ZQR, MSN8IPP, BEART, Q0EOTZOT     Type & Screen (If Applicable):  No results found for: LABABO, LABRH    Drug/Infectious Status (If Applicable):  No results found for: HIV, HEPCAB    COVID-19 Screening (If Applicable):   Lab Results   Component Value Date    COVID19 Not Detected 05/21/2020         Anesthesia Evaluation  Patient summary reviewed and Nursing notes reviewed no history of anesthetic complications:   Airway: Mallampati: II  TM distance: >3 FB   Neck ROM: full  Mouth opening: > = 3 FB Dental: normal exam         Pulmonary:   (+) sleep apnea: on CPAP,      (-) pneumonia, COPD, asthma, shortness of breath, recent URI and not a current smoker                           Cardiovascular:  Exercise tolerance: good (>4 METS),   (+) hypertension:, hyperlipidemia    (-) pacemaker, valvular problems/murmurs, past MI, CAD, CABG/stent, dysrhythmias,  angina,  CHF, orthopnea, PND and  MALONEY      Rhythm: regular                      Neuro/Psych:   (+) neuromuscular disease:, psychiatric history:   (-) seizures, TIA, CVA, headaches and depression/anxiety            GI/Hepatic/Renal: Neg GI/Hepatic/Renal ROS            Endo/Other:    (+) : arthritis:., no malignancy/cancer. (-) diabetes mellitus, hypothyroidism, hyperthyroidism, blood dyscrasia, no electrolyte abnormalities, no malignancy/cancer               Abdominal:           Vascular: negative vascular ROS. Anesthesia Plan      general     ASA 3     (Patient with severe DAVID. Diagnosed 30 years ago at age 33. Requires CPAP every night. MAC would be difficult due to DAVID, Today we will use GETA with definitive airway.)  Induction: intravenous. MIPS: Postoperative opioids intended and Prophylactic antiemetics administered. Anesthetic plan and risks discussed with patient. Plan discussed with CRNA. Adolfo Kruse MD   5/26/2020    This pre-anesthesia assessment may be used as a history and physical.    DOS STAFF ADDENDUM:    Pt seen and examined, chart reviewed (including anesthesia, drug and allergy history). No interval changes to history and physical examination. Anesthetic plan, risks, benefits, alternatives, and personnel involved discussed with patient. Patient verbalized an understanding and agrees to proceed.       Adolfo Kruse MD  May 26, 2020  7:58 AM

## 2020-05-26 NOTE — CARE COORDINATION
[facility] received referral from APRN-CNP for [address]. Will need PT notes and DME Orders. Will verify patient's insurance and follow up with patient to deliver the ordered item(s) prior to discharge.   PT may dispense the RW from the [address] [place] [address] [employer] to the patient if needed prior to [facility] arrival to hospital.    Thank you for the referral.  Electronically signed by Federico Turk on 5/26/2020 at 11:44 AM  Cell ph# [phone]

## 2020-05-26 NOTE — OP NOTE
[address] [facility]                                OPERATIVE REPORT    PATIENT NAME: Ambrosio Khanna                      :        1960  MED REC NO:   [id number]                          ROOM:  ACCOUNT NO:   [de-identified]                           ADMIT DATE: 2020  PROVIDER:     Radha Ferrara. Shira Almeida MD    DATE OF PROCEDURE:  2020    PREOPERATIVE DIAGNOSIS:  Tricompartmental degenerative osteoarthritis of  the left knee. POSTOPERATIVE DIAGNOSIS:  Tricompartmental degenerative osteoarthritis  of the left knee. OPERATION PERFORMED:  Left robotic-assisted total knee arthroplasty. SURGEON:  Radha Ferrara. Shira Almeida MD    ASSISTANT:  EZE Connelly    INDICATIONS:  The patient is a 58-year-old female who presented with  knee pain. She was treated conservatively with anti-inflammatories,  physical therapy, and intra-articular cortisone injections; none of  these gave her any significant relief. She continued to have symptoms  and now is here for total knee replacement. This patient understands  during [place] pandemic that her chance and risk is increased for  helena COVID-19. We had a very long discussion about this with the  patient and answered all of her questions, and as scheduled, we would  plan to do this as outpatient total knee arthroplasty. OPERATIVE PROCEDURE:  The patient was brought to the operating room. Once anesthetic was obtained and intravenous antibiotics delivered, her  left knee and foot were prepped and draped in a sterile fashion. The  leg was exsanguinated and tourniquet was placed to 300 mmHg on the  thigh. An anterior incision was made. Skin and subcutaneous tissue were  divided down to the extensor mechanism. Medial parapatellar arthrotomy  was performed. Next, the patella was everted and measured and cut to  accept a 31-mm patella.   This was placed in a superior and good overall stability. The  patient tolerated the procedure well. The wound was irrigated out. It was injected with 100 mL of Exparel and  bupivacaine. Next, it was lavaged for five minutes with 3 gm of  tranexamic acid, finally lavaged with aqueous iodine, and then 1 gm of  vancomycin powder was placed for antibiotic prophylaxis. The wound was  then closed in layers. A dressing was applied. The patient was brought  to the recovery room in stable condition. TRISTON Evangelista MD    D: 05/26/2020 11:15:25       T: 05/26/2020 12:15:40     [id number]  Job#: [id number]     Doc#: [id number]    CC:

## 2020-06-02 ENCOUNTER — TELEPHONE (OUTPATIENT)
Dept: ORTHOPEDICS UNIT | Age: 60
End: 2020-06-02

## 2020-06-02 NOTE — TELEPHONE ENCOUNTER
Attempted to contact patient. Left voicemail for patient stating purpose and call back number.    Rebel Fajardoegrini  Orthopedic Nurse Navigator  Phone number: [phone]  Future Appointments   Date Time Provider Breanne Smithi   6/3/2020  2:40 PM [facility]   6/11/2020 11:00 AM Brady Luna, MD [facility]   7/20/2020  9:00 AM Diana Dia MD [facility]       Electronically signed by Alexi Moya RN on 6/2/2020 at 3:22 PM

## 2020-06-05 ENCOUNTER — TELEPHONE (OUTPATIENT)
Dept: ORTHOPEDIC SURGERY | Age: 60
End: 2020-06-05

## 2020-06-09 ENCOUNTER — TELEPHONE (OUTPATIENT)
Dept: ORTHOPEDIC SURGERY | Age: 60
End: 2020-06-09

## 2020-06-11 ENCOUNTER — OFFICE VISIT (OUTPATIENT)
Dept: SLEEP MEDICINE | Age: 60
End: 2020-06-11
Payer: COMMERCIAL

## 2020-06-11 ENCOUNTER — OFFICE VISIT (OUTPATIENT)
Dept: ORTHOPEDIC SURGERY | Age: 60
End: 2020-06-11
Payer: COMMERCIAL

## 2020-06-11 VITALS
RESPIRATION RATE: 16 BRPM | TEMPERATURE: 99.2 F | BODY MASS INDEX: 27.02 KG/M2 | WEIGHT: 146.8 LBS | HEART RATE: 111 BPM | OXYGEN SATURATION: 98 % | SYSTOLIC BLOOD PRESSURE: 140 MMHG | DIASTOLIC BLOOD PRESSURE: 88 MMHG | HEIGHT: 62 IN

## 2020-06-11 VITALS — TEMPERATURE: 97.6 F

## 2020-06-11 PROBLEM — M75.82 ROTATOR CUFF TENDONITIS, LEFT: Status: ACTIVE | Noted: 2020-06-11

## 2020-06-11 PROBLEM — M25.512 LEFT SHOULDER PAIN: Status: ACTIVE | Noted: 2020-06-11

## 2020-06-11 PROBLEM — Z96.652 HISTORY OF TOTAL KNEE ARTHROPLASTY, LEFT: Status: ACTIVE | Noted: 2020-06-11

## 2020-06-11 PROCEDURE — 20610 DRAIN/INJ JOINT/BURSA W/O US: CPT | Performed by: PHYSICIAN ASSISTANT

## 2020-06-11 PROCEDURE — 99214 OFFICE O/P EST MOD 30 MIN: CPT | Performed by: PSYCHIATRY & NEUROLOGY

## 2020-06-11 PROCEDURE — 99213 OFFICE O/P EST LOW 20 MIN: CPT | Performed by: PHYSICIAN ASSISTANT

## 2020-06-11 RX ORDER — DOXEPIN HYDROCHLORIDE 3 MG/1
3 TABLET ORAL NIGHTLY
Qty: 30 TABLET | Refills: 5 | Status: SHIPPED | OUTPATIENT
Start: 2020-06-11 | End: 2020-11-25 | Stop reason: SDUPTHER

## 2020-06-11 RX ORDER — ARMODAFINIL 150 MG/1
TABLET ORAL
Qty: 30 TABLET | Refills: 5 | Status: SHIPPED | OUTPATIENT
Start: 2020-06-11 | End: 2021-09-21

## 2020-06-11 ASSESSMENT — SLEEP AND FATIGUE QUESTIONNAIRES
HOW LIKELY ARE YOU TO NOD OFF OR FALL ASLEEP IN A CAR, WHILE STOPPED FOR A FEW MINUTES IN TRAFFIC: 0
ESS TOTAL SCORE: 10
HOW LIKELY ARE YOU TO NOD OFF OR FALL ASLEEP WHILE SITTING INACTIVE IN A PUBLIC PLACE: 0
HOW LIKELY ARE YOU TO NOD OFF OR FALL ASLEEP WHILE WATCHING TV: 2
HOW LIKELY ARE YOU TO NOD OFF OR FALL ASLEEP WHILE SITTING AND TALKING TO SOMEONE: 0
HOW LIKELY ARE YOU TO NOD OFF OR FALL ASLEEP WHILE SITTING AND READING: 3
HOW LIKELY ARE YOU TO NOD OFF OR FALL ASLEEP WHEN YOU ARE A PASSENGER IN A CAR FOR AN HOUR WITHOUT A BREAK: 0
HOW LIKELY ARE YOU TO NOD OFF OR FALL ASLEEP WHILE SITTING QUIETLY AFTER LUNCH WITHOUT ALCOHOL: 2
HOW LIKELY ARE YOU TO NOD OFF OR FALL ASLEEP WHILE LYING DOWN TO REST IN THE AFTERNOON WHEN CIRCUMSTANCES PERMIT: 3

## 2020-06-11 ASSESSMENT — ENCOUNTER SYMPTOMS
CHOKING: 0
ALLERGIC/IMMUNOLOGIC NEGATIVE: 1
EYES NEGATIVE: 1
APNEA: 0

## 2020-06-11 NOTE — PROGRESS NOTES
Subjective:      Patient ID: Pradeep Tellez is a 63 y.o. female. Chief Complaint   Patient presents with    Post-Op Check     Left TKA 2020    Shoulder Problem     Left        HPI:   Here for first post op visit. S/P left knee arthroplasty. The date of procedure-  2020. Pain is 5/- . Currently taking Tylenol for pain. She thinks she is doing very well with her knee arthroplasty. This was performed as an outpatient. She has new complaint today and at his left shoulder pain. She has a history of prior left shoulder rotator cuff repair years ago with about 75% relief of her symptoms. She has had continued pain in the left shoulder. The symptoms are worsening status post knee arthroplasty. Left shoulder pain 4/10. Pain is associated with sleeping, reaching, pulling. Review of Symptoms:  She denies fever or chills. She denies any other complaints. Past Medical History:   Diagnosis Date    HBP (high blood pressure)     Hyperlipidemia     Obstructive sleep apnea on CPAP     Plantar fasciitis     Seasonal allergies        Family History   Problem Relation Age of Onset    Arthritis Other     Asthma Other     Cancer Other     Diabetes Other     High Blood Pressure Other     Breast Cancer Mother     Other Mother        Past Surgical History:   Procedure Laterality Date    CARPAL TUNNEL RELEASE Right      SECTION  6263,0566, ,     COLONOSCOPY  2018    Dr. Jenniffer Gudino with polyp    FOOT NEUROMA SURGERY Right     HAND SURGERY Left     Thumb joint    HYSTERECTOMY      LASIK      SHOULDER SURGERY      SPINAL FUSION      L4    TOTAL KNEE ARTHROPLASTY Left 2020    ROBOTIC ASSISTED LEFT TOTAL KNEE REPLACEMENT performed by Nhung Mckenzie MD at [facility] Not on file   Tobacco Use    Smoking status: Never Smoker    Smokeless tobacco: Never Used   Substance and Sexual Activity    Alcohol use:  Yes

## 2020-06-15 ENCOUNTER — HOSPITAL ENCOUNTER (OUTPATIENT)
Dept: PHYSICAL THERAPY | Age: 60
Setting detail: THERAPIES SERIES
Discharge: HOME | End: 2020-06-15
Payer: COMMERCIAL

## 2020-06-15 PROCEDURE — 97110 THERAPEUTIC EXERCISES: CPT

## 2020-06-15 PROCEDURE — G0283 ELEC STIM OTHER THAN WOUND: HCPCS

## 2020-06-15 PROCEDURE — 97140 MANUAL THERAPY 1/> REGIONS: CPT

## 2020-06-15 PROCEDURE — 97530 THERAPEUTIC ACTIVITIES: CPT

## 2020-06-15 PROCEDURE — 97161 PT EVAL LOW COMPLEX 20 MIN: CPT

## 2020-06-15 NOTE — PROGRESS NOTES
Physical Therapy  Initial Assessment  Date: 6/15/2020  Patient Name: Joan Boles  MRN: [id number]  : 1960     Treatment Diagnosis: Pain. Decreased left knee ROM and Strength    Restrictions  Restrictions/Precautions  Restrictions/Precautions: Fall Risk(low)    Subjective   General  Chart Reviewed: Yes  Referring Practitioner: Dr. Manan Boothe  Referral Date : 06/15/20  Diagnosis: Left TKR  PT Visit Information  Onset Date: 20  PT Insurance Information: [employer]. Subjective  Subjective: Pt states she had left TKR on 20 and did home PT for a few weeks. Pt states she is doing pretty good. She finished PT on wednesday. States she is using 1 crutch or no AD. Pain is 4-5/10, worse at night. Feels she has a lot of tightness. Vision/Hearing  Vision  Vision: Within Functional Limits  Hearing  Hearing: Within functional limits    Orientation  Orientation  Overall Orientation Status: Within Normal Limits    Objective     Observation/Palpation  Posture: Good  Observation: Pt ambulating with 1 crutch and mostly normal gait. Edema: Mild in left knee  Scar: Incision is healing well, still has tape on. AROM RLE (degrees)  RLE AROM: WNL  RLE General AROM: 0-145 deg  AROM LLE (degrees)  LLE AROM : WNL  LLE General AROM: 3-110 deg  Joint Mobility  ROM LLE: Patellar joint play is WNL    Strength RLE  Strength RLE: WNL  Strength LLE  Strength LLE: WNL                    Assessment   Conditions Requiring Skilled Therapeutic Intervention  Body structures, Functions, Activity limitations: Decreased ADL status; Decreased functional mobility ; Decreased ROM; Decreased strength; Increased pain  Assessment: CLOF- S/P Left TKR on 20- pt has limited strength and ROM that is affecting walking  Treatment Diagnosis: Pain.  Decreased left knee ROM and Strength  Prognosis: Excellent  Decision Making: Low Complexity  REQUIRES PT FOLLOW UP: Yes         Plan   Plan  Times per week: 2  Times per day: Daily  Plan weeks: 6  Current Treatment Recommendations: Strengthening, Gait Training, Manual Therapy - Joint Manipulation, ROM, Pain Management, Modalities, Functional Mobility Training, Manual Therapy - Soft Tissue Mobilization, Home Exercise Program    OutComes Score  LEFS Total Score: 20 (06/15/20 1801)                                               AM-PAC Score             Goals  Short term goals  Time Frame for Short term goals: 2 Weeks  Short term goal 1: Pt will show independence in HEP  Short term goal 2: Pt will show 1-120 deg L knee AROM  Long term goals  Time Frame for Long term goals : 6 Weeks  Long term goal 1: Pt will show 0-130 deg L knee AROM so she can walk with normal gait.   Long term goal 2: Pt will show 4+/5 left hip and knee strength so pt can ambulate up and down steps normally  Long term goal 3: Pt will report <3/10 pain consistently  Patient Goals   Patient goals : \"Walk without device, normally\"       Therapy Time   Individual Concurrent Group Co-treatment   Time In           Time Out           Minutes                   Makenzie navarrete, PT

## 2020-06-15 NOTE — FLOWSHEET NOTE
[] Progression is slowed due to complexities listed. [] Progression has been slowed due to co-morbidities. [x] Plan just implemented, too soon to assess goals progression  [] Other:    Charges: Therapeutic Exercise:  [x] (59767) Provided verbal/tactile cueing for activities to restore or maintain strength, flexibility, endurance, ROM for improvements with self-care, mobility, lifting and ambulation. Neuromuscular Re-Education  [] (44554) Provided verbal/tactile cueing for activities to restore or maintain balance, coordination, kinesthetic sense, posture, motor skill, proprioception for self-care, mobility, lifting, and ambulation. Therapeutic Activities:    [x] (77673) Provided verbal/tactile cueing to address functional limitations related to loss of mobility, strength, balance, and coordination. Gait Training:  [x] (98210) Provided training and instruction to the patient for proper postural muscle recruitment and positioning with ambulation re-education     Home Exercise Program:    [x] (53128) Reviewed/Progressed HEP activities related to strengthening, flexibility, endurance, ROM for functional self-care, mobility, lifting and ambulation   [] (53173) Reviewed/Progressed HEP activities related to improving balance, coordination, kinesthetic sense, posture, motor skill, proprioception for self-care, mobility, lifting, and ambulation      Manual Treatments:  MFR / STM / Oscillations-Mobs:  G-I, II, III, IV / Manipulation / MLD  [x] (24519) Provided manual therapy to mobilize  soft tissue/joints/fluid for the purpose of modulating pain, promoting relaxation, increasing ROM, reducing/eliminating soft tissue swelling/inflammation/restriction, improving soft tissue extensibility and allowing for proper ROM for normal function with self- care, mobility, lifting and ambulation.         Timed Code Treatment Minutes: 45   Total Treatment Minutes: 82     [x] EVAL (LOW) 91132   [] EVAL (MOD) 01046   [] EVAL (HIGH) 11374   [] RE-EVAL   [x] TE (26372) x 1    [] Aquatic (90404) x  [] NMR (53627)   x  [] Aquatic Group (93273) x  [x] Manual (97348) x 1   [] Ultrasound (24717) x  [x] TA (96074) x 1  [] Mech Traction (89988)  [] Ionto (14006)           [x] ES (un) (95430):   [] Vasopump (00753) [] Other:      Assessment  [x] Patient tolerated treatment well [] Patient limited by fatigue  [] Patient limited by pain  [] Patient limited by other medical complications  [] Other:     Prognosis: [x] Good [] Fair  [] Poor    Goals:    Short term goals  Time Frame for Short term goals: 2 Weeks  Short term goal 1: Pt will show independence in HEP  Short term goal 2: Pt will show 1-120 deg L knee AROM      Long term goals  Time Frame for Long term goals : 6 Weeks  Long term goal 1: Pt will show 0-130 deg L knee AROM so she can walk with normal gait.   Long term goal 2: Pt will show 4+/5 left hip and knee strength so pt can ambulate up and down steps normally  Long term goal 3: Pt will report <3/10 pain consistently     Patient Requires Follow-up: [x] Yes  [] No    Plan:   [] Continue per plan of care [] Alter current plan (see comments)  [x] Plan of care initiated [] Hold pending MD visit [] Discharge    Plan for Next Session:    Manual: Above to improve left knee mobility  Progress left knee strengthening as able  Gait training  Modalities: CP, Estim    Electronically signed by:  Varsha Allison PT

## 2020-06-17 ENCOUNTER — HOSPITAL ENCOUNTER (OUTPATIENT)
Dept: PHYSICAL THERAPY | Age: 60
Setting detail: THERAPIES SERIES
Discharge: HOME | End: 2020-06-17
Payer: COMMERCIAL

## 2020-06-17 PROCEDURE — 97110 THERAPEUTIC EXERCISES: CPT

## 2020-06-17 PROCEDURE — 97140 MANUAL THERAPY 1/> REGIONS: CPT

## 2020-06-17 PROCEDURE — G0283 ELEC STIM OTHER THAN WOUND: HCPCS

## 2020-06-17 NOTE — FLOWSHEET NOTE
Physical Therapy Daily Treatment Note  Date:  2020    Patient Name:  Echo Venegas    :  1960  MRN: [id number]    Restrictions/Precautions:  Fall Risk(low). Left TKR 20  Pertinent Medical History: HBP, Spinal Fusion  Medical/Treatment Diagnosis Information:  · Diagnosis: Left TKR  · Treatment Diagnosis: Pain. Decreased left knee ROM and Strength  Insurance/Certification information:  PT Insurance Information: [employer] [place]- [employer]. Physician Information:  Referring Practitioner: Dr. Cruz Mention of care signed (Y/N):    Visit# / total visits:    Pain level: 3-6/10     Functional Outcomes Measure:  Test: LEFS  Score: 20 (CL)    Progress Note: []  Yes  []  No  Next due by: Visit #10      History of Injury:  Pt states she had left TKR on 20 and did home PT for a few weeks. Pt states she is doing pretty good. She finished PT on wednesday. States she is using 1 crutch or no AD. Pain is 4-5/10, worse at night. Feels she has a lot of tightness. Subjective:     20: Pt  States she is doing good, had a little soreness yesterday but today feels good. Objective:   Observation:    Test measurements:     20: L knee PROM: 2-119 deg    Exercises:  Exercise/Equipment Resistance/Repetitions Other comments   Nu Step X 5 min, level 4    Incline 3 x 30\"    HSS/HQS 3 x 30\" L    Heel Slide X 20 Left    SAQ     QS and SLR 10 x 10\", 2 x 10    Leg Press     Step          HEP     Reviewed current HEP  6/15    Added knee flexion and extension stretches  6/15   HS and Gastroc stretch  6/15                    Other Therapeutic Activities:      Home Exercise Program:      Manual Treatments:   STM to left knee muscles, Patellar mobilizations. PROM left knee flexion and extension stretches    Modalities:   CP with Estim: IFC to left knee x 10 min    Progression Towards Functional goals:  [] Patient is progressing as expected towards functional goals listed.     [] Progression is slowed due to

## 2020-06-18 ENCOUNTER — HOSPITAL ENCOUNTER (OUTPATIENT)
Dept: PHYSICAL THERAPY | Age: 60
Setting detail: THERAPIES SERIES
Discharge: HOME | End: 2020-06-18
Payer: COMMERCIAL

## 2020-06-18 PROCEDURE — 97140 MANUAL THERAPY 1/> REGIONS: CPT

## 2020-06-18 PROCEDURE — G0283 ELEC STIM OTHER THAN WOUND: HCPCS

## 2020-06-18 PROCEDURE — 97110 THERAPEUTIC EXERCISES: CPT

## 2020-06-18 NOTE — FLOWSHEET NOTE
towards functional goals listed. [] Progression is slowed due to complexities listed. [] Progression has been slowed due to co-morbidities. [x] Plan just implemented, too soon to assess goals progression  [] Other:    Charges: Therapeutic Exercise:  [x] (81606) Provided verbal/tactile cueing for activities to restore or maintain strength, flexibility, endurance, ROM for improvements with self-care, mobility, lifting and ambulation. Neuromuscular Re-Education  [] (72883) Provided verbal/tactile cueing for activities to restore or maintain balance, coordination, kinesthetic sense, posture, motor skill, proprioception for self-care, mobility, lifting, and ambulation. Therapeutic Activities:    [x] (57257) Provided verbal/tactile cueing to address functional limitations related to loss of mobility, strength, balance, and coordination. Gait Training:  [x] (38299) Provided training and instruction to the patient for proper postural muscle recruitment and positioning with ambulation re-education     Home Exercise Program:    [x] (65282) Reviewed/Progressed HEP activities related to strengthening, flexibility, endurance, ROM for functional self-care, mobility, lifting and ambulation   [] (03377) Reviewed/Progressed HEP activities related to improving balance, coordination, kinesthetic sense, posture, motor skill, proprioception for self-care, mobility, lifting, and ambulation      Manual Treatments:  MFR / STM / Oscillations-Mobs:  G-I, II, III, IV / Manipulation / MLD  [x] (37585) Provided manual therapy to mobilize  soft tissue/joints/fluid for the purpose of modulating pain, promoting relaxation, increasing ROM, reducing/eliminating soft tissue swelling/inflammation/restriction, improving soft tissue extensibility and allowing for proper ROM for normal function with self- care, mobility, lifting and ambulation.         Timed Code Treatment Minutes: 57   Total Treatment Minutes: 70     [] EVAL (LOW) 50897   [] EVAL (MOD) 85056   [] EVAL (HIGH) 76878   [] RE-EVAL   [x] TE (19927) x  3   [] Aquatic (12612) x  [] NMR (76150)   x  [] Aquatic Group (63487) x  [x] Manual (20699) x 1   [] Ultrasound (06724) x  [] TA (93975) x   [] Mech Traction (00844)  [] Ionto (81946)           [x] ES (un) (98435):   [] Vasopump (84720) [] Other:      Assessment  [x] Patient tolerated treatment well [] Patient limited by fatigue  [] Patient limited by pain  [] Patient limited by other medical complications  [] Other:     Prognosis: [x] Good [] Fair  [] Poor    Goals:    Short term goals  Time Frame for Short term goals: 2 Weeks  Short term goal 1: Pt will show independence in HEP  Short term goal 2: Pt will show 1-120 deg L knee AROM      Long term goals  Time Frame for Long term goals : 6 Weeks  Long term goal 1: Pt will show 0-130 deg L knee AROM so she can walk with normal gait.   Long term goal 2: Pt will show 4+/5 left hip and knee strength so pt can ambulate up and down steps normally  Long term goal 3: Pt will report <3/10 pain consistently     Patient Requires Follow-up: [x] Yes  [] No    Plan:   [x] Continue per plan of care [] Alter current plan (see comments)  [] Plan of care initiated [] Hold pending MD visit [] Discharge    Plan for Next Session:    Manual: Above to improve left knee mobility  Progress left knee strengthening as able  Gait training  Modalities: CP, Estim    Electronically signed by:  Gloria Sarmiento, PT

## 2020-06-22 ENCOUNTER — HOSPITAL ENCOUNTER (OUTPATIENT)
Dept: PHYSICAL THERAPY | Age: 60
Setting detail: THERAPIES SERIES
Discharge: HOME | End: 2020-06-22
Payer: COMMERCIAL

## 2020-06-22 PROCEDURE — 97140 MANUAL THERAPY 1/> REGIONS: CPT

## 2020-06-22 PROCEDURE — G0283 ELEC STIM OTHER THAN WOUND: HCPCS

## 2020-06-22 PROCEDURE — 97110 THERAPEUTIC EXERCISES: CPT

## 2020-06-24 ENCOUNTER — HOSPITAL ENCOUNTER (OUTPATIENT)
Dept: PHYSICAL THERAPY | Age: 60
Setting detail: THERAPIES SERIES
Discharge: HOME | End: 2020-06-24
Payer: COMMERCIAL

## 2020-06-24 ENCOUNTER — TELEPHONE (OUTPATIENT)
Dept: PULMONOLOGY | Age: 60
End: 2020-06-24

## 2020-06-24 PROCEDURE — 97140 MANUAL THERAPY 1/> REGIONS: CPT

## 2020-06-24 PROCEDURE — G0283 ELEC STIM OTHER THAN WOUND: HCPCS

## 2020-06-24 PROCEDURE — 97110 THERAPEUTIC EXERCISES: CPT

## 2020-06-24 NOTE — TELEPHONE ENCOUNTER
Patient called back and states she would like to go with [address]. Please sent order out.  ty

## 2020-06-24 NOTE — FLOWSHEET NOTE
muscles, Patellar mobilizations. PROM left knee flexion and extension stretches    Modalities:   CP with Estim: IFC to left knee x 10 min    Progression Towards Functional goals:  [] Patient is progressing as expected towards functional goals listed. [] Progression is slowed due to complexities listed. [] Progression has been slowed due to co-morbidities. [x] Plan just implemented, too soon to assess goals progression  [] Other:    Charges: Therapeutic Exercise:  [x] (35631) Provided verbal/tactile cueing for activities to restore or maintain strength, flexibility, endurance, ROM for improvements with self-care, mobility, lifting and ambulation. Neuromuscular Re-Education  [] (79504) Provided verbal/tactile cueing for activities to restore or maintain balance, coordination, kinesthetic sense, posture, motor skill, proprioception for self-care, mobility, lifting, and ambulation. Therapeutic Activities:    [x] (15448) Provided verbal/tactile cueing to address functional limitations related to loss of mobility, strength, balance, and coordination.      Gait Training:  [x] (09628) Provided training and instruction to the patient for proper postural muscle recruitment and positioning with ambulation re-education     Home Exercise Program:    [x] (70486) Reviewed/Progressed HEP activities related to strengthening, flexibility, endurance, ROM for functional self-care, mobility, lifting and ambulation   [] (12079) Reviewed/Progressed HEP activities related to improving balance, coordination, kinesthetic sense, posture, motor skill, proprioception for self-care, mobility, lifting, and ambulation      Manual Treatments:  MFR / STM / Oscillations-Mobs:  G-I, II, III, IV / Manipulation / MLD  [x] (38016) Provided manual therapy to mobilize  soft tissue/joints/fluid for the purpose of modulating pain, promoting relaxation, increasing ROM, reducing/eliminating soft tissue swelling/inflammation/restriction, improving

## 2020-06-24 NOTE — TELEPHONE ENCOUNTER
Send cpap order to [facility] also let patient know we have no record of any sleep study being done so [facility] might require a sleep study. She is aware.

## 2020-06-24 NOTE — TELEPHONE ENCOUNTER
Pt called to find out about a new cpap machine, your last note stated you are going to send an order but no order in chart. Please place order. She stated she will check with insurance and call back and let us know who she would like for dme.  Recommended [facility] or [address] but she decided to call back, please place order

## 2020-06-25 ENCOUNTER — OFFICE VISIT (OUTPATIENT)
Dept: ORTHOPEDIC SURGERY | Age: 60
End: 2020-06-25

## 2020-06-25 ENCOUNTER — TELEPHONE (OUTPATIENT)
Dept: PULMONOLOGY | Age: 60
End: 2020-06-25

## 2020-06-25 ENCOUNTER — HOSPITAL ENCOUNTER (OUTPATIENT)
Dept: PHYSICAL THERAPY | Age: 60
Setting detail: THERAPIES SERIES
Discharge: HOME | End: 2020-06-25
Payer: COMMERCIAL

## 2020-06-25 VITALS — TEMPERATURE: 97.9 F | WEIGHT: 146 LBS | BODY MASS INDEX: 26.87 KG/M2 | HEIGHT: 62 IN

## 2020-06-25 PROCEDURE — 99024 POSTOP FOLLOW-UP VISIT: CPT | Performed by: PHYSICIAN ASSISTANT

## 2020-06-25 PROCEDURE — 97110 THERAPEUTIC EXERCISES: CPT

## 2020-06-25 PROCEDURE — 97140 MANUAL THERAPY 1/> REGIONS: CPT

## 2020-06-25 NOTE — FLOWSHEET NOTE
Physical Therapy Daily Treatment Note  Date:  2020    Patient Name:  Melissa Stearns    :  1960  MRN: [id number]    Restrictions/Precautions:  Fall Risk(low). Left TKR 20  Pertinent Medical History: HBP, Spinal Fusion  Medical/Treatment Diagnosis Information:  · Diagnosis: Left TKR  · Treatment Diagnosis: Pain. Decreased left knee ROM and Strength  Insurance/Certification information:  PT Insurance Information: Medical [place]- [facility]. [facility]. Physician Information:  Referring Practitioner: Dr. Rashid Hash of care signed (Y/N):    Visit# / total visits:    Pain level: 3/10     Functional Outcomes Measure:  Test: LEFS  Score: 20 (CL)    Progress Note: []  Yes  []  No  Next due by: Visit #10      History of Injury:  Pt states she had left TKR on 20 and did home PT for a few weeks. Pt states she is doing pretty good. She finished PT on wednesday. States she is using 1 crutch or no AD. Pain is 4-5/10, worse at night. Feels she has a lot of tightness. Subjective:     20: Pt  States she is doing good, had a little soreness yesterday but today feels good. 20: Pt states she is a little sore but not bad  20: States she did some swimming over the weekend and her knee felt stiff  20: States she was pretty sore yesterday  20: Sore last night but today is better.      Objective:   Observation:    Test measurements:     20: L knee PROM: 2-119 deg   20: L knee PROM: 1-125 deg   20: L knee PROM: 1-130 deg   20: L Knee PROM: 1-131 deg                 L Knee Strength: Knee Extension 4-/5    Exercises:  Exercise/Equipment Resistance/Repetitions Other comments   Nu Step X 6 min, Level 4    Incline 3 x 30\"    HSS/HQS 3 x 30\" L    Heel Slide X 20 Left    SAQ 2 X 20 L  3#    QS and SLR     Leg Press 75#  2 x 20 B  40# 2 x 20 L    Step     Bike X 5 min, level 8                   HEP     Reviewed current HEP  6/15    Added knee flexion and extension stretches

## 2020-06-29 ENCOUNTER — HOSPITAL ENCOUNTER (OUTPATIENT)
Dept: PHYSICAL THERAPY | Age: 60
Setting detail: THERAPIES SERIES
Discharge: HOME | End: 2020-06-29
Payer: COMMERCIAL

## 2020-06-29 PROCEDURE — 97110 THERAPEUTIC EXERCISES: CPT

## 2020-06-29 PROCEDURE — 97140 MANUAL THERAPY 1/> REGIONS: CPT

## 2020-06-29 PROCEDURE — G0283 ELEC STIM OTHER THAN WOUND: HCPCS

## 2020-06-29 NOTE — PROGRESS NOTES
This dictation was done with [employer] dictation and may contain mechanical errors related to translation. Temperature 97.9 °F (36.6 °C), temperature source Temporal, height 5' 2\" (1.575 m), weight 146 lb (66.2 kg), not currently breastfeeding. This is a pleasant 65-year-old female who recently had a left total knee replacement on 5/26/2020. She is making good progress with the left knee she has approximately 0 to 120 degrees of motion already she was recently seen for her left shoulder and had a cortisone injection on 6/11/2020. This provided 90% relief. We discussed the use of prophylactic antibiotics and going to the dentist and she was given a note for return to work. While she was here we discussed how soon we could do the right knee. Dr. Joann Harris wants her to continue work on stretch and strengthening and he anticipates being able to do the opposite knee as soon as 12 weeks postop left knee total knee replacement. My impression is stable healing left total knee replacement.     We will see her back in 4 to 5 weeks and discuss the left knee we will get x-rays and then we would consider scheduling right total knee replacement

## 2020-06-29 NOTE — FLOWSHEET NOTE
Reviewed current HEP  6/15    Added knee flexion and extension stretches  6/15   HS and Gastroc stretch  6/15                    Other Therapeutic Activities:      Home Exercise Program:      Manual Treatments:   STM to left knee muscles, Patellar mobilizations. PROM left knee flexion and extension stretches    Modalities:       Progression Towards Functional goals:  [] Patient is progressing as expected towards functional goals listed. [] Progression is slowed due to complexities listed. [] Progression has been slowed due to co-morbidities. [x] Plan just implemented, too soon to assess goals progression  [] Other:    Charges: Therapeutic Exercise:  [x] (28125) Provided verbal/tactile cueing for activities to restore or maintain strength, flexibility, endurance, ROM for improvements with self-care, mobility, lifting and ambulation. Neuromuscular Re-Education  [] (38547) Provided verbal/tactile cueing for activities to restore or maintain balance, coordination, kinesthetic sense, posture, motor skill, proprioception for self-care, mobility, lifting, and ambulation. Therapeutic Activities:    [x] (00522) Provided verbal/tactile cueing to address functional limitations related to loss of mobility, strength, balance, and coordination.      Gait Training:  [x] (25095) Provided training and instruction to the patient for proper postural muscle recruitment and positioning with ambulation re-education     Home Exercise Program:    [x] (40058) Reviewed/Progressed HEP activities related to strengthening, flexibility, endurance, ROM for functional self-care, mobility, lifting and ambulation   [] (27023) Reviewed/Progressed HEP activities related to improving balance, coordination, kinesthetic sense, posture, motor skill, proprioception for self-care, mobility, lifting, and ambulation      Manual Treatments:  MFR / STM / Oscillations-Mobs:  G-I, II, III, IV / Manipulation / MLD  [x] (01077) Provided manual therapy to mobilize  soft tissue/joints/fluid for the purpose of modulating pain, promoting relaxation, increasing ROM, reducing/eliminating soft tissue swelling/inflammation/restriction, improving soft tissue extensibility and allowing for proper ROM for normal function with self- care, mobility, lifting and ambulation. Timed Code Treatment Minutes: 59   Total Treatment Minutes: 75     [] EVAL (LOW) 35300   [] EVAL (MOD) 72201   [] EVAL (HIGH) 82232   [] RE-EVAL   [x] TE (20479) x  3   [] Aquatic (14655) x  [] NMR (59670)   x  [] Aquatic Group (68870) x  [x] Manual (78818) x 1   [] Ultrasound (48863) x  [] TA (22272) x   [] Mech Traction (26124)  [] Ionto (49488)           [x] ES (un) (03498):   [] Vasopump (08708) [] Other:      Assessment  [x] Patient tolerated treatment well [] Patient limited by fatigue  [] Patient limited by pain  [] Patient limited by other medical complications  [] Other:     Prognosis: [x] Good [] Fair  [] Poor    Goals:    Short term goals  Time Frame for Short term goals: 2 Weeks  Short term goal 1: Pt will show independence in HEP  Short term goal 2: Pt will show 1-120 deg L knee AROM      Long term goals  Time Frame for Long term goals : 6 Weeks  Long term goal 1: Pt will show 0-130 deg L knee AROM so she can walk with normal gait.   Long term goal 2: Pt will show 4+/5 left hip and knee strength so pt can ambulate up and down steps normally  Long term goal 3: Pt will report <3/10 pain consistently     Patient Requires Follow-up: [x] Yes  [] No    Plan:   [x] Continue per plan of care [] Alter current plan (see comments)  [] Plan of care initiated [] Hold pending MD visit [] Discharge    Plan for Next Session:    Manual: Above to improve left knee mobility  Progress left knee strengthening as able  Gait training  Modalities: CP, Estim    Electronically signed by:  Eduard Church PT

## 2020-06-30 ENCOUNTER — HOSPITAL ENCOUNTER (OUTPATIENT)
Dept: WOMENS IMAGING | Age: 60
Discharge: HOME | End: 2020-06-30
Payer: COMMERCIAL

## 2020-06-30 PROCEDURE — 77067 SCR MAMMO BI INCL CAD: CPT

## 2020-07-02 ENCOUNTER — HOSPITAL ENCOUNTER (OUTPATIENT)
Dept: PHYSICAL THERAPY | Age: 60
Setting detail: THERAPIES SERIES
Discharge: HOME | End: 2020-07-02
Payer: COMMERCIAL

## 2020-07-02 PROCEDURE — 97140 MANUAL THERAPY 1/> REGIONS: CPT

## 2020-07-02 PROCEDURE — 97110 THERAPEUTIC EXERCISES: CPT

## 2020-07-02 NOTE — FLOWSHEET NOTE
Physical Therapy Daily Treatment Note  Date:  2020    Patient Name:  Olman Del Angel    :  1960  MRN: [id number]    Restrictions/Precautions:  Fall Risk(low). Left TKR 20  Pertinent Medical History: HBP, Spinal Fusion  Medical/Treatment Diagnosis Information:  · Diagnosis: Left TKR  · Treatment Diagnosis: Pain. Decreased left knee ROM and Strength  Insurance/Certification information:  PT Insurance Information: Medical [place]- Med. [facility]. Physician Information:  Referring Practitioner: Dr. Dominguez Horton of care signed (Y/N):    Visit# / total visits:    Pain level: 2/10     Functional Outcomes Measure:  Test: LEFS  Score: 20 (CL)    Progress Note: []  Yes  []  No  Next due by: Visit #10      History of Injury:  Pt states she had left TKR on 20 and did home PT for a few weeks. Pt states she is doing pretty good. She finished PT on wednesday. States she is using 1 crutch or no AD. Pain is 4-5/10, worse at night. Feels she has a lot of tightness. Subjective:     20: Pt  States she is doing good, had a little soreness yesterday but today feels good. 20: Pt states she is a little sore but not bad  20: States she did some swimming over the weekend and her knee felt stiff  20: States she was pretty sore yesterday  20: Sore last night but today is better. 20: Pt states she is doing a little better,has been using her pool and it seems to help  20: Patient reports knee is improving,has been using her swimming pool which helps.     Objective:   Observation:    Test measurements:     20: L knee PROM: 2-119 deg   20: L knee PROM: 1-125 deg   20: L knee PROM: 1-130 deg   20: L Knee PROM: 1-131 deg                 L Knee Strength: Knee Extension 4-/5    Exercises:  Exercise/Equipment Resistance/Repetitions Other comments      Incline 3 x 30\"    HSS/HQS 3 x 30\" L    Heel Slide X 20 Left    SAQ 2 X 20 L  3#    QS and SLR     Leg Press 75# 2 x 20 B  40# 2 x 20 L    Step 4 \" 2 x 10    Bike X 5 min, level 9    Wobble 2 min              HEP     Reviewed current HEP  6/15    Added knee flexion and extension stretches  6/15   HS and Gastroc stretch  6/15                    Other Therapeutic Activities:      Home Exercise Program:      Manual Treatments:   STM to left knee muscles, Patellar mobilizations. PROM left knee flexion and extension stretches    Modalities:       Progression Towards Functional goals:  [] Patient is progressing as expected towards functional goals listed. [] Progression is slowed due to complexities listed. [] Progression has been slowed due to co-morbidities. [x] Plan just implemented, too soon to assess goals progression  [] Other:    Charges: Therapeutic Exercise:  [x] (91161) Provided verbal/tactile cueing for activities to restore or maintain strength, flexibility, endurance, ROM for improvements with self-care, mobility, lifting and ambulation. Neuromuscular Re-Education  [] (25039) Provided verbal/tactile cueing for activities to restore or maintain balance, coordination, kinesthetic sense, posture, motor skill, proprioception for self-care, mobility, lifting, and ambulation. Therapeutic Activities:    [x] (53663) Provided verbal/tactile cueing to address functional limitations related to loss of mobility, strength, balance, and coordination.      Gait Training:  [x] (51949) Provided training and instruction to the patient for proper postural muscle recruitment and positioning with ambulation re-education     Home Exercise Program:    [x] (19421) Reviewed/Progressed HEP activities related to strengthening, flexibility, endurance, ROM for functional self-care, mobility, lifting and ambulation   [] (50006) Reviewed/Progressed HEP activities related to improving balance, coordination, kinesthetic sense, posture, motor skill, proprioception for self-care, mobility, lifting, and ambulation      Manual Treatments: MFR / STM / Oscillations-Mobs:  G-I, II, III, IV / Manipulation / MLD  [x] (04803) Provided manual therapy to mobilize  soft tissue/joints/fluid for the purpose of modulating pain, promoting relaxation, increasing ROM, reducing/eliminating soft tissue swelling/inflammation/restriction, improving soft tissue extensibility and allowing for proper ROM for normal function with self- care, mobility, lifting and ambulation. Timed Code Treatment Minutes: 59   Total Treatment Minutes: 75     [] EVAL (LOW) 21176   [] EVAL (MOD) 99688   [] EVAL (HIGH) 08967   [] RE-EVAL   [x] TE (44088) x  3   [] Aquatic (98195) x  [] NMR (36539)   x  [] Aquatic Group (34973) x  [x] Manual (55657) x 1   [] Ultrasound (54653) x  [] TA (29663) x   [] Mech Traction (59304)  [] Ionto (64388)           [] ES (un) (57679):   [] Vasopump (18826) [] Other:      Assessment  [x] Patient tolerated treatment well [] Patient limited by fatigue  [] Patient limited by pain  [] Patient limited by other medical complications  [] Other:     Prognosis: [x] Good [] Fair  [] Poor    Goals:    Short term goals  Time Frame for Short term goals: 2 Weeks  Short term goal 1: Pt will show independence in HEP  Short term goal 2: Pt will show 1-120 deg L knee AROM      Long term goals  Time Frame for Long term goals : 6 Weeks  Long term goal 1: Pt will show 0-130 deg L knee AROM so she can walk with normal gait.   Long term goal 2: Pt will show 4+/5 left hip and knee strength so pt can ambulate up and down steps normally  Long term goal 3: Pt will report <3/10 pain consistently     Patient Requires Follow-up: [x] Yes  [] No    Plan:   [x] Continue per plan of care [] Alter current plan (see comments)  [] Plan of care initiated [] Hold pending MD visit [] Discharge    Plan for Next Session:    Manual: Above to improve left knee mobility  Progress left knee strengthening as able  Gait training  Modalities: CP, Estim    Electronically signed by:  Inetta Hammans, PTA

## 2020-07-07 ENCOUNTER — HOSPITAL ENCOUNTER (OUTPATIENT)
Dept: PHYSICAL THERAPY | Age: 60
Setting detail: THERAPIES SERIES
Discharge: HOME | End: 2020-07-07
Payer: COMMERCIAL

## 2020-07-07 PROCEDURE — 97140 MANUAL THERAPY 1/> REGIONS: CPT

## 2020-07-07 PROCEDURE — 97110 THERAPEUTIC EXERCISES: CPT

## 2020-07-07 NOTE — FLOWSHEET NOTE
SAQ 2 X 20 L  4#    QS and SLR 10 x 10\",  2 x 10 L    Leg Press 75#  2 x 20 B  40# 2 x 20 L    Step 4 \" 2 x 10    Bike X 5 min, level 9    Wobble 2 min              HEP     Reviewed current HEP  6/15    Added knee flexion and extension stretches  6/15   HS and Gastroc stretch  6/15                    Other Therapeutic Activities:      Home Exercise Program:      Manual Treatments:   STM to left knee muscles, Patellar mobilizations. PROM left knee flexion and extension stretches    Modalities:       Progression Towards Functional goals:  [] Patient is progressing as expected towards functional goals listed. [] Progression is slowed due to complexities listed. [] Progression has been slowed due to co-morbidities. [x] Plan just implemented, too soon to assess goals progression  [] Other:    Charges: Therapeutic Exercise:  [x] (60437) Provided verbal/tactile cueing for activities to restore or maintain strength, flexibility, endurance, ROM for improvements with self-care, mobility, lifting and ambulation. Neuromuscular Re-Education  [] (40669) Provided verbal/tactile cueing for activities to restore or maintain balance, coordination, kinesthetic sense, posture, motor skill, proprioception for self-care, mobility, lifting, and ambulation. Therapeutic Activities:    [x] (89430) Provided verbal/tactile cueing to address functional limitations related to loss of mobility, strength, balance, and coordination.      Gait Training:  [x] (09641) Provided training and instruction to the patient for proper postural muscle recruitment and positioning with ambulation re-education     Home Exercise Program:    [x] (58562) Reviewed/Progressed HEP activities related to strengthening, flexibility, endurance, ROM for functional self-care, mobility, lifting and ambulation   [] (01653) Reviewed/Progressed HEP activities related to improving balance, coordination, kinesthetic sense, posture, motor skill, proprioception for self-care, mobility, lifting, and ambulation      Manual Treatments:  MFR / STM / Oscillations-Mobs:  G-I, II, III, IV / Manipulation / MLD  [x] (14921) Provided manual therapy to mobilize  soft tissue/joints/fluid for the purpose of modulating pain, promoting relaxation, increasing ROM, reducing/eliminating soft tissue swelling/inflammation/restriction, improving soft tissue extensibility and allowing for proper ROM for normal function with self- care, mobility, lifting and ambulation. Timed Code Treatment Minutes: 50   Total Treatment Minutes: 55     [] EVAL (LOW) 18738   [] EVAL (MOD) 35102   [] EVAL (HIGH) 82111   [] RE-EVAL   [x] TE (61526) x  2   [] Aquatic (96005) x  [] NMR (25522)   x  [] Aquatic Group (03397) x  [x] Manual (68674) x 1   [] Ultrasound (07882) x  [] TA (46777) x   [] Mech Traction (66158)  [] Ionto (33627)           [] ES (un) (54035):   [] Vasopump (36882) [] Other:      Assessment  [x] Patient tolerated treatment well [] Patient limited by fatigue  [] Patient limited by pain  [] Patient limited by other medical complications  [] Other:     Prognosis: [x] Good [] Fair  [] Poor    Goals:    Short term goals  Time Frame for Short term goals: 2 Weeks  Short term goal 1: Pt will show independence in HEP  Short term goal 2: Pt will show 1-120 deg L knee AROM      Long term goals  Time Frame for Long term goals : 6 Weeks  Long term goal 1: Pt will show 0-130 deg L knee AROM so she can walk with normal gait.   Long term goal 2: Pt will show 4+/5 left hip and knee strength so pt can ambulate up and down steps normally  Long term goal 3: Pt will report <3/10 pain consistently     Patient Requires Follow-up: [x] Yes  [] No    Plan:   [x] Continue per plan of care [] Alter current plan (see comments)  [] Plan of care initiated [] Hold pending MD visit [] Discharge    Plan for Next Session:    Manual: Above to improve left knee mobility  Progress left knee strengthening as able  Gait training  Modalities: CP, Estim    Electronically signed by:  Jw Peña PTA

## 2020-07-09 ENCOUNTER — HOSPITAL ENCOUNTER (OUTPATIENT)
Dept: PHYSICAL THERAPY | Age: 60
Setting detail: THERAPIES SERIES
Discharge: HOME | End: 2020-07-09
Payer: COMMERCIAL

## 2020-07-09 PROCEDURE — 97140 MANUAL THERAPY 1/> REGIONS: CPT

## 2020-07-09 PROCEDURE — 97110 THERAPEUTIC EXERCISES: CPT

## 2020-07-09 NOTE — FLOWSHEET NOTE
Physical Therapy Daily Treatment Note  Date:  2020    Patient Name:  Marcus Posadas    :  1960  MRN: [id number]    Restrictions/Precautions:  Fall Risk(low). Left TKR 20  Pertinent Medical History: HBP, Spinal Fusion  Medical/Treatment Diagnosis Information:  · Diagnosis: Left TKR  · Treatment Diagnosis: Pain. Decreased left knee ROM and Strength  Insurance/Certification information:  PT Insurance Information: [employer] [place]- Med. [facility] Physician Information:  Referring Practitioner: Dr. Radha Sanchez of care signed (Y/N):    Visit# / total visits:  10/12  Pain level: 3-4/10     Functional Outcomes Measure:  Test: LEFS  Score: 20 (CL)    Progress Note: []  Yes  []  No  Next due by: Visit #10      History of Injury:  Pt states she had left TKR on 20 and did home PT for a few weeks. Pt states she is doing pretty good. She finished PT on wednesday. States she is using 1 crutch or no AD. Pain is 4-5/10, worse at night. Feels she has a lot of tightness. Subjective:     20: Pt  States she is doing good, had a little soreness yesterday but today feels good. 20: Pt states she is a little sore but not bad  20: States she did some swimming over the weekend and her knee felt stiff  20: States she was pretty sore yesterday  20: Sore last night but today is better. 20: Pt states she is doing a little better,has been using her pool and it seems to help  20: Patient reports knee is improving,has been using her swimming pool which helps. 20: Patient reports knee is doing well.   20: Still a little sore and still has trouble with steps and getting on/off floor    Objective:   Observation:    Test measurements:     20: L knee PROM: 2-119 deg   20: L knee PROM: 1-125 deg   20: L knee PROM: 1-130 deg   20: L Knee PROM: 1-131 deg                 L Knee Strength: Knee Extension 4-/5   20:  Left Knee PROM: 0-136 deg in supine L Knee Strength: Knee Extension 4/5    Exercises:  Exercise/Equipment Resistance/Repetitions Other comments      Incline 3 x 30\"    HSS/HQS 3 x 30\" L    Heel Slide X 20 Left    SAQ  X 20 L  4#    QS and SLR     Leg Press 80#  2 x 20 B  45#  2 x 20  L    Step  Resume   Bike X 5 min, level 10    Wobble  Resume             HEP     Reviewed current HEP  6/15    Added knee flexion and extension stretches  6/15   HS and Gastroc stretch  6/15                    Other Therapeutic Activities:      Home Exercise Program:      Manual Treatments:   STM to left knee muscles, Patellar mobilizations. PROM left knee flexion and extension stretches    Modalities:       Progression Towards Functional goals:  [] Patient is progressing as expected towards functional goals listed. [] Progression is slowed due to complexities listed. [] Progression has been slowed due to co-morbidities. [x] Plan just implemented, too soon to assess goals progression  [] Other:    Charges: Therapeutic Exercise:  [x] (70420) Provided verbal/tactile cueing for activities to restore or maintain strength, flexibility, endurance, ROM for improvements with self-care, mobility, lifting and ambulation. Neuromuscular Re-Education  [] (07465) Provided verbal/tactile cueing for activities to restore or maintain balance, coordination, kinesthetic sense, posture, motor skill, proprioception for self-care, mobility, lifting, and ambulation. Therapeutic Activities:    [x] (38116) Provided verbal/tactile cueing to address functional limitations related to loss of mobility, strength, balance, and coordination.      Gait Training:  [x] (89512) Provided training and instruction to the patient for proper postural muscle recruitment and positioning with ambulation re-education     Home Exercise Program:    [x] (99151) Reviewed/Progressed HEP activities related to strengthening, flexibility, endurance, ROM for functional self-care, mobility, lifting and ambulation   [] (15900) Reviewed/Progressed HEP activities related to improving balance, coordination, kinesthetic sense, posture, motor skill, proprioception for self-care, mobility, lifting, and ambulation      Manual Treatments:  MFR / STM / Oscillations-Mobs:  G-I, II, III, IV / Manipulation / MLD  [x] (30357) Provided manual therapy to mobilize  soft tissue/joints/fluid for the purpose of modulating pain, promoting relaxation, increasing ROM, reducing/eliminating soft tissue swelling/inflammation/restriction, improving soft tissue extensibility and allowing for proper ROM for normal function with self- care, mobility, lifting and ambulation. Timed Code Treatment Minutes: 45   Total Treatment Minutes: 45     [] EVAL (LOW) 73211   [] EVAL (MOD) 23952   [] EVAL (HIGH) 82688   [] RE-EVAL   [x] TE (47447) x  2   [] Aquatic (81703) x  [] NMR (22371)   x  [] Aquatic Group (95090) x  [x] Manual (63360) x 1   [] Ultrasound (17298) x  [] TA (34333) x   [] Mech Traction (48454)  [] Ionto (47302)           [] ES (un) (13311):   [] Vasopump (14879) [] Other:      Assessment  [x] Patient tolerated treatment well [] Patient limited by fatigue  [] Patient limited by pain  [] Patient limited by other medical complications  [] Other:     Prognosis: [x] Good [] Fair  [] Poor    Goals:    Short term goals  Time Frame for Short term goals: 2 Weeks  Short term goal 1: Pt will show independence in HEP  Short term goal 2: Pt will show 1-120 deg L knee AROM      Long term goals  Time Frame for Long term goals : 6 Weeks  Long term goal 1: Pt will show 0-130 deg L knee AROM so she can walk with normal gait.   Long term goal 2: Pt will show 4+/5 left hip and knee strength so pt can ambulate up and down steps normally  Long term goal 3: Pt will report <3/10 pain consistently     Patient Requires Follow-up: [x] Yes  [] No    Plan:   [x] Continue per plan of care [] Alter current plan (see comments)  [] Plan of care initiated [] Hold pending MD visit [] Discharge    Plan for Next Session:    Manual: Above to improve left knee mobility  Progress left knee strengthening as able  Gait training  Modalities: CP, Estim    Electronically signed by:  Rhett Curran PT

## 2020-07-13 ENCOUNTER — HOSPITAL ENCOUNTER (OUTPATIENT)
Dept: PHYSICAL THERAPY | Age: 60
Setting detail: THERAPIES SERIES
Discharge: HOME | End: 2020-07-13
Payer: COMMERCIAL

## 2020-07-13 PROCEDURE — 97110 THERAPEUTIC EXERCISES: CPT

## 2020-07-13 PROCEDURE — 97140 MANUAL THERAPY 1/> REGIONS: CPT

## 2020-07-13 NOTE — FLOWSHEET NOTE
Physical Therapy Daily Treatment Note  Date:  2020    Patient Name:  Enriqueta Arellano    :  1960  MRN: [id number]    Restrictions/Precautions:  Fall Risk(low). Left TKR 20  Pertinent Medical History: HBP, Spinal Fusion  Medical/Treatment Diagnosis Information:  · Diagnosis: Left TKR  · Treatment Diagnosis: Pain. Decreased left knee ROM and Strength  Insurance/Certification information:  PT Insurance Information: [facility]- [facility] [employer] Physician Information:  Referring Practitioner: Dr. Bonilla Sat of care signed (Y/N):    Visit# / total visits:    Pain level: 2-310     Functional Outcomes Measure:  Test: LEFS  Score: 20 (CL)    Progress Note: []  Yes  []  No  Next due by: Visit #10      History of Injury:  Pt states she had left TKR on 20 and did home PT for a few weeks. Pt states she is doing pretty good. She finished PT on wednesday. States she is using 1 crutch or no AD. Pain is 4-5/10, worse at night. Feels she has a lot of tightness. Subjective:     20: Pt  States she is doing good, had a little soreness yesterday but today feels good. 20: Pt states she is a little sore but not bad  20: States she did some swimming over the weekend and her knee felt stiff  20: States she was pretty sore yesterday  20: Sore last night but today is better. 20: Pt states she is doing a little better,has been using her pool and it seems to help  20: Patient reports knee is improving,has been using her swimming pool which helps. 20: Patient reports knee is doing well. 20: Still a little sore and still has trouble with steps and getting on/off floor  20: Pt states she is doing pretty good, still little trouble going down steps and on uneven surfaces. Goes back to work on Monday.     Objective:   Observation:    Test measurements:     20: L knee PROM: 2-119 deg   20: L knee PROM: 1-125 deg   20: L knee PROM: 1-130 deg   6/25/20: L Knee PROM: 1-131 deg                 L Knee Strength: Knee Extension 4-/5  7/9/20:  Left Knee PROM: 0-136 deg in supine           L Knee Strength: Knee Extension 4/5 7/13/20: Left knee PROM: 0-140 deg in supine    Exercises:  Exercise/Equipment Resistance/Repetitions Other comments      Incline 3 x 30\"    HSS/HQS 3 x 30\" L    Heel Slide X 20 Left    LAQ  X 20 L  5#    QS and SLR     Leg Press 85#  2 x 20 B  45#  2 x 20  L    Step 6 Inch  LSU  2 x 10  Step Down 2 x 10 R Added to HEP 7/13   Bike X 5 min, level 10    Wobble  Resume   SLS  Added to HEP 7/13        HEP     Reviewed current HEP  6/15    Added knee flexion and extension stretches  6/15   HS and Gastroc stretch  6/15                    Other Therapeutic Activities:      Home Exercise Program:      Manual Treatments:   STM to left knee muscles, Patellar mobilizations. PROM left knee flexion and extension stretches    Modalities:       Progression Towards Functional goals:  [] Patient is progressing as expected towards functional goals listed. [] Progression is slowed due to complexities listed. [] Progression has been slowed due to co-morbidities. [x] Plan just implemented, too soon to assess goals progression  [] Other:    Charges: Therapeutic Exercise:  [x] (61160) Provided verbal/tactile cueing for activities to restore or maintain strength, flexibility, endurance, ROM for improvements with self-care, mobility, lifting and ambulation. Neuromuscular Re-Education  [] (93581) Provided verbal/tactile cueing for activities to restore or maintain balance, coordination, kinesthetic sense, posture, motor skill, proprioception for self-care, mobility, lifting, and ambulation. Therapeutic Activities:    [x] (08889) Provided verbal/tactile cueing to address functional limitations related to loss of mobility, strength, balance, and coordination.      Gait Training:  [x] (49818) Provided training and instruction to the patient for proper postural muscle recruitment and positioning with ambulation re-education     Home Exercise Program:    [x] (49216) Reviewed/Progressed HEP activities related to strengthening, flexibility, endurance, ROM for functional self-care, mobility, lifting and ambulation   [] (46737) Reviewed/Progressed HEP activities related to improving balance, coordination, kinesthetic sense, posture, motor skill, proprioception for self-care, mobility, lifting, and ambulation      Manual Treatments:  MFR / STM / Oscillations-Mobs:  G-I, II, III, IV / Manipulation / MLD  [x] (75365) Provided manual therapy to mobilize  soft tissue/joints/fluid for the purpose of modulating pain, promoting relaxation, increasing ROM, reducing/eliminating soft tissue swelling/inflammation/restriction, improving soft tissue extensibility and allowing for proper ROM for normal function with self- care, mobility, lifting and ambulation. Timed Code Treatment Minutes: 44   Total Treatment Minutes: 44     [] EVAL (LOW) 12312   [] EVAL (MOD) 92822   [] EVAL (HIGH) 72291   [] RE-EVAL   [x] TE (43619) x  2   [] Aquatic (91255) x  [] NMR (93768)   x  [] Aquatic Group (87126) x  [x] Manual (52182) x 1   [] Ultrasound (08625) x  [] TA (13598) x   [] Mech Traction (21830)  [] Ionto (60856)           [] ES (un) (46637):   [] Vasopump (32025) [] Other:      Assessment  [x] Patient tolerated treatment well [] Patient limited by fatigue  [] Patient limited by pain  [] Patient limited by other medical complications  [] Other:     Prognosis: [x] Good [] Fair  [] Poor    Goals:    Short term goals  Time Frame for Short term goals: 2 Weeks  Short term goal 1: Pt will show independence in HEP  Short term goal 2: Pt will show 1-120 deg L knee AROM      Long term goals  Time Frame for Long term goals : 6 Weeks  Long term goal 1: Pt will show 0-130 deg L knee AROM so she can walk with normal gait.   Long term goal 2: Pt will show 4+/5 left hip and knee strength so pt can ambulate up and down steps normally  Long term goal 3: Pt will report <3/10 pain consistently     Patient Requires Follow-up: [x] Yes  [] No    Plan:   [x] Continue per plan of care [] Alter current plan (see comments)  [] Plan of care initiated [] Hold pending MD visit [] Discharge    Plan for Next Session:    D/C next visit  Manual: Above to improve left knee mobility  Progress left knee strengthening as able  Gait training  Modalities: CP, Estim    Electronically signed by:  Luz Elena Lynch PT

## 2020-07-15 ENCOUNTER — TELEPHONE (OUTPATIENT)
Dept: PULMONOLOGY | Age: 60
End: 2020-07-15

## 2020-07-15 NOTE — TELEPHONE ENCOUNTER
Had a sleep study 12 years ago, We do nit have it, you can search for it again, I send an order for APAP

## 2020-07-16 ENCOUNTER — HOSPITAL ENCOUNTER (OUTPATIENT)
Dept: PHYSICAL THERAPY | Age: 60
Setting detail: THERAPIES SERIES
Discharge: HOME | End: 2020-07-16
Payer: COMMERCIAL

## 2020-07-16 PROCEDURE — 97110 THERAPEUTIC EXERCISES: CPT

## 2020-07-16 NOTE — PROGRESS NOTES
Outpatient Physical Therapy  [] [facility]    Phone: [phone]   Fax: [phone]   [x] [facility]  Phone: [phone]   Fax: [phone]  [] [facility]              Phone: [phone]   Fax: [phone]      Physical Therapy Discharge Note  Date: 2020        Patient Name:  Grazyna Fowler    :  1960  MRN: [id number]  Restrictions/Precautions:  Fall Risk(low). Left TKR 20  Pertinent Medical History: HBP, Spinal Fusion  Medical/Treatment Diagnosis Information:  · Diagnosis: Left TKR  · Treatment Diagnosis: Pain. Decreased left knee ROM and Strength  Insurance/Certification information:  PT Insurance Information: [employer]. Physician Information:  Referring Practitioner: Dr. Kianna Espinosa of care signed (Y/N):    Visit# / total visits:    Pain level:      2-3/10      Time Period for Report:  6/15/20 to 20  Cancels/No-shows to date:  0    Plan of Care/Treatment to date:  [x] Therapeutic Exercise    [] Modalities:  [x] Therapeutic Activity     [] Ultrasound  [] Electrical Stimulation  [] Gait Training      [] Cervical Traction    [] Lumbar Traction  [] Neuromuscular Re-education  [] Cold/hotpack [] Iontophoresis  [x] Instruction in HEP      Other:  [] Manual Therapy       []    [] Aquatic Therapy       []                          Significant Findings At Last Visit/Comments:    Subjective:  20: Pt states she is doing good, little sore. Is returning to work on Monday. Objective:   Observation: Ambulating with normal gait   Test measurements:   20: Left knee PROM: 0-140 deg in supine   20: Left knee Extension: 4/5      Assessment:   Summary: Pt made great progress with strength and ROM in knee. Pt now discharged with HEP for strengthening. Progression Towards Functional goals:  [x] Patient is progressing as expected towards functional goals listed. [] Progression is slowed due to complexities listed.   [] Progression has been slowed due to co-morbidities. [] Plan just implemented, too soon to assess goals progression  [] Other:    Goals:  Short term goals  Time Frame for Short term goals: 2 Weeks  Short term goal 1: Pt will show independence in HEP- MET  Short term goal 2: Pt will show 1-120 deg L knee AROM - MET     Long term goals  Time Frame for Long term goals : 6 Weeks  Long term goal 1: Pt will show 0-130 deg L knee AROM so she can walk with normal gait. - MET  Long term goal 2: Pt will show 4+/5 left hip and knee strength so pt can ambulate up and down steps normally- PART MET  Long term goal 3: Pt will report <3/10 pain consistently - MET      Rehab Potential: [] Excellent [x] Good [] Fair  [] Poor     Goal Status:  [x] Achieved [] Partially Achieved  [] Not Achieved     Current Frequency/Duration:  # Days per week: [] 1 day # Weeks: [] 1 week [] 4 weeks      [x] 2 days   [] 2 weeks [] 5 weeks      [] 3 days   [] 3 weeks [x] 6 weeks     Patient Status: [] Continue per initial plan of Care     [x] Patient now discharged     [] Additional visits requested, Please re-certify for additional visits:      Requested frequency/duration:  X/week for weeks    Electronically signed by:  Tc Buchanan PT    If you have any questions or concerns, please don't hesitate to call.   Thank you for your referral.    Physician Signature:________________________________Date:__________________  By signing above, therapists plan is approved by physician

## 2020-07-16 NOTE — FLOWSHEET NOTE
Physical Therapy Daily Treatment Note  Date:  2020    Patient Name:  Irais Rodriguez    :  1960  MRN: [id number]    Restrictions/Precautions:  Fall Risk(low). Left TKR 20  Pertinent Medical History: HBP, Spinal Fusion  Medical/Treatment Diagnosis Information:  · Diagnosis: Left TKR  · Treatment Diagnosis: Pain. Decreased left knee ROM and Strength  Insurance/Certification information:  PT Insurance Information: Medical [place]- [employer]. Physician Information:  Referring Practitioner: Dr. Taye Diaz of care signed (Y/N):    Visit# / total visits:    Pain level: 2-3/10     Functional Outcomes Measure:  Test: LEFS  Score: 20 (CL)    Progress Note: []  Yes  []  No  Next due by: Visit #10      History of Injury:  Pt states she had left TKR on 20 and did home PT for a few weeks. Pt states she is doing pretty good. She finished PT on wednesday. States she is using 1 crutch or no AD. Pain is 4-5/10, worse at night. Feels she has a lot of tightness. Subjective:     20: Pt states she is doing a little better,has been using her pool and it seems to help  20: Patient reports knee is improving,has been using her swimming pool which helps. 20: Patient reports knee is doing well. 20: Still a little sore and still has trouble with steps and getting on/off floor  20: Pt states she is doing pretty good, still little trouble going down steps and on uneven surfaces. Goes back to work on Monday. 20: Pt states she is doing good, little sore. Is returning to work on Monday.     Objective:   Observation:    Test measurements:     20: L knee PROM: 2-119 deg   20: L knee PROM: 1-125 deg   20: L knee PROM: 1-130 deg   20: L Knee PROM: 1-131 deg                 L Knee Strength: Knee Extension 20:  Left Knee PROM: 0-136 deg in supine           L Knee Strength: Knee Extension 20: Left knee PROM: 0-140 deg in supine   20: Left knee Extension: 4/5    Exercises:  Exercise/Equipment Resistance/Repetitions Other comments      Incline 3 x 30\"    HSS/HQS 3 x 30\" L    Heel Slide X 20 Left    LAQ X 20 L  5#    QS and SLR     Leg Press 90#  2 x 20 B  45#  2 x 20  L    Step 6 Inch  LSU  2 x 10  Step Down 2 x 10 R Added to HEP 7/13   Bike X 5 min, level 10    Wobble  Resume   SLS 3 x 30\" Added to HEP 7/13        HEP     Reviewed current HEP  6/15    Added knee flexion and extension stretches  6/15   HS and Gastroc stretch  6/15   LSU- Dip and Step down  7/16/20               Other Therapeutic Activities:      Home Exercise Program:      Manual Treatments:   STM to left knee muscles, Patellar mobilizations. PROM left knee flexion and extension stretches    Modalities:       Progression Towards Functional goals:  [] Patient is progressing as expected towards functional goals listed. [] Progression is slowed due to complexities listed. [] Progression has been slowed due to co-morbidities. [x] Plan just implemented, too soon to assess goals progression  [] Other:    Charges: Therapeutic Exercise:  [x] (94402) Provided verbal/tactile cueing for activities to restore or maintain strength, flexibility, endurance, ROM for improvements with self-care, mobility, lifting and ambulation. Neuromuscular Re-Education  [] (49168) Provided verbal/tactile cueing for activities to restore or maintain balance, coordination, kinesthetic sense, posture, motor skill, proprioception for self-care, mobility, lifting, and ambulation. Therapeutic Activities:    [x] (96382) Provided verbal/tactile cueing to address functional limitations related to loss of mobility, strength, balance, and coordination.      Gait Training:  [x] (89317) Provided training and instruction to the patient for proper postural muscle recruitment and positioning with ambulation re-education     Home Exercise Program:    [x] (75942) Reviewed/Progressed HEP activities related to per plan of care [] Alter current plan (see comments)  [] Plan of care initiated [] Hold pending MD visit [] Discharge    Plan for Next Session:    D/C next visit  Manual: Above to improve left knee mobility  Progress left knee strengthening as able  Gait training  Modalities: CP, Estim    Electronically signed by:  Merissa Ga PT

## 2020-08-06 ENCOUNTER — OFFICE VISIT (OUTPATIENT)
Dept: ORTHOPEDIC SURGERY | Age: 60
End: 2020-08-06
Payer: COMMERCIAL

## 2020-08-06 VITALS — TEMPERATURE: 97.9 F | BODY MASS INDEX: 26.7 KG/M2 | HEIGHT: 62 IN

## 2020-08-06 PROCEDURE — 99214 OFFICE O/P EST MOD 30 MIN: CPT | Performed by: PHYSICIAN ASSISTANT

## 2020-08-08 NOTE — PROGRESS NOTES
This dictation was done with [employer] dictation and may contain mechanical errors related to translation. The review of systems was currently provided by the patient and reviewed with the medical assistant at today's visit. Please see media. Subjective:  Shaq Ledezma is a 63 y.o. who is here for both of her knees. She had a left knee replacement done on 5/26/2020 and is doing extremely well. Showed he has 0 to 126 degrees of motion she has good strength and is ready to talk about proceeding to a right total knee replacement for the osteoarthritis in her right knee      Patient Active Problem List   Diagnosis    [facility] DJ[facility](carpometacarpal degenerative joint disease), localized primary    Fibromyalgia    Essential hypertension    Mixed hyperlipidemia    Dysthymia    Metatarsalgia of right foot    History of total knee arthroplasty, left    Left shoulder pain    Rotator cuff tendonitis, left           Current Outpatient Medications on File Prior to Visit   Medication Sig Dispense Refill    Armodafinil (NUVIGIL) 150 MG TABS tablet one tab QAM prn 30 tablet 5    doxepin (SILENOR) 3 MG TABS tablet Take 1 tablet by mouth nightly 30 tablet 5    aspirin EC 81 MG EC tablet Take 1 tablet by mouth 2 times daily for 14 days Please avoid missing doses. 28 tablet 0    losartan-hydrochlorothiazide (HYZAAR) 100-12.5 MG per tablet TAKE 1 TABLET BY MOUTH ONCE DAILY 90 tablet 3    amitriptyline (ELAVIL) 25 MG tablet Take 1 tablet by mouth nightly 90 tablet 3    atorvastatin (LIPITOR) 80 MG tablet TAKE 1 TABLET BY MOUTH ONCE DAILY 90 tablet 3    cyclobenzaprine (FLEXERIL) 5 MG tablet Take 1 tablet by mouth daily 90 tablet 3    fluticasone (FLONASE) 50 MCG/ACT nasal spray 1 spray by Nasal route daily as needed        No current facility-administered medications on file prior to visit.           Objective:   Temperature 97.9 °F (36.6 °C), temperature source Infrared, height 5' 2\" (1.575 m), not currently breastfeeding. On examination this is a pleasant 78-year-old female in no acute distress she is alert and oriented x3 she has a well-healed incision on the left knee and good range of motion with minimal swelling. Her quad tone is getting better and I am happy with her overall improvement. Her right knee still has a lot of crepitus she has 1-2+ edema medial joint line tenderness but not a true Loulou or a Lockman. She has good distal pulses good dorsiflexion plantarflexion strength. Neuro exam grossly intact both lower extremities. Intact sensation to light touch. Motor exam 4+ to 5/5 in all major motor groups. Negative Sinha's sign. Skin is warm, dry and intact with out erythema or significant increased temperature around the knee joint(s). There are no cutaneous lesions or lymphadenopathy present. X-RAYS:  No x-rays were taken today      Assessment:  Stable healing left total knee replacement and advanced osteoarthritis of the right knee    Plan:  During today's visit, there was approximately 25 minutes of face-to-face discussion in regards to the patient's current condition and treatment options. More than 50 % of the time was counseling and coordination of care. We took the time to go through some risk benefits and the rationale treatment and the difficulties of proceeding to a total knee replacement and show she has enough healing of the left knee. However given the risk benefits and rationale she wants to proceed to right total knee replacement sometime in the near future.   We will place her on a schedule and she will see Dr. Smiley Badillo preoperatively      PROCEDURE NOTE:   Schedule right total knee replacement      They will schedule a follow up in preop

## 2020-09-03 ENCOUNTER — OFFICE VISIT (OUTPATIENT)
Dept: ORTHOPEDIC SURGERY | Age: 60
End: 2020-09-03
Payer: COMMERCIAL

## 2020-09-03 VITALS — WEIGHT: 140 LBS | HEIGHT: 62 IN | TEMPERATURE: 98 F | BODY MASS INDEX: 25.76 KG/M2

## 2020-09-03 PROCEDURE — 99213 OFFICE O/P EST LOW 20 MIN: CPT | Performed by: PHYSICIAN ASSISTANT

## 2020-09-03 RX ORDER — METHYLPREDNISOLONE 4 MG/1
TABLET ORAL
Qty: 1 KIT | Refills: 0 | Status: SHIPPED | OUTPATIENT
Start: 2020-09-03 | End: 2020-09-09

## 2020-09-08 NOTE — PROGRESS NOTES
This dictation was done with FindIt dictation and may contain mechanical errors related to translation. The review of systems was currently provided by the patient and reviewed with the medical assistant at today's visit. Please see media. Subjective:  Ameena Lewis is a 63 y.o. who is here complaining of pain and giving way in her knees. Specifically the right knee in which her arthritis is becoming worse. She states that she is getting some calf pain and some numbness with the following in the swelling and she fell 6 times today alone. It was not as bad prior to today but when she went to work it became a lot more noticeable on problematic. She was last seen in August had x-rays done of both of her knees and has a history of a left total knee replacement from 5/26/2020. She is on the schedule to proceed to a total knee replacement and the CAT scan has not been done yet.   I did send her for x-rays today just to see if there is any changes prior to scheduling the surgery      Patient Active Problem List   Diagnosis    [facility](carpometacarpal degenerative joint disease), localized primary    Fibromyalgia    Essential hypertension    Mixed hyperlipidemia    Dysthymia    Metatarsalgia of right foot    History of total knee arthroplasty, left    Left shoulder pain    Rotator cuff tendonitis, left           Current Outpatient Medications on File Prior to Visit   Medication Sig Dispense Refill    Armodafinil (NUVIGIL) 150 MG TABS tablet one tab QAM prn 30 tablet 5    doxepin (SILENOR) 3 MG TABS tablet Take 1 tablet by mouth nightly 30 tablet 5    losartan-hydrochlorothiazide (HYZAAR) 100-12.5 MG per tablet TAKE 1 TABLET BY MOUTH ONCE DAILY 90 tablet 3    amitriptyline (ELAVIL) 25 MG tablet Take 1 tablet by mouth nightly 90 tablet 3    atorvastatin (LIPITOR) 80 MG tablet TAKE 1 TABLET BY MOUTH ONCE DAILY 90 tablet 3    cyclobenzaprine (FLEXERIL) 5 MG tablet Take 1 tablet by mouth daily 90 tablet 3  fluticasone (FLONASE) 50 MCG/ACT nasal spray 1 spray by Nasal route daily as needed       aspirin EC 81 MG EC tablet Take 1 tablet by mouth 2 times daily for 14 days Please avoid missing doses. 28 tablet 0     No current facility-administered medications on file prior to visit. Objective:   Temperature 98 °F (36.7 °C), height 5' 2\" (1.575 m), weight 140 lb (63.5 kg), not currently breastfeeding. On examination this is a pleasant 58-year-old female no acute distress she is alert and oriented x3 she does have some tightness to her hamstrings and some quad atrophy with 1+ edema in the knee. She has palpable tenderness over the greater tuberosity as well as the Pez anserine area consistent with some bursitis and a contusion. Her quad tone is somewhat down but she is able to walk without antalgia  Neuro exam grossly intact both lower extremities. Intact sensation to light touch. Motor exam 4+ to 5/5 in all major motor groups. Negative Sinha's sign. Skin is warm, dry and intact with out erythema or significant increased temperature around the knee joint(s). There are no cutaneous lesions or lymphadenopathy present. X-RAYS:  X-rays taken at the office today, of the right knee, AP, lateral, and sunrise confirm advanced osteoarthritis in the right knee especially in the medial compartment with joint space narrowing subchondral sclerosis and osteophyte formation. Assessment:  Right knee osteoarthritis hamstring strain    Plan:  During today's visit, there was approximately 15 minutes of face-to-face discussion in regards to the patient's current condition and treatment options. More than 50 % of the time was counseling and coordination of care.       We talked about short and long-term expectations regular on a good exercise program to strengthen the leg she knows to wear a wrap for support and to use a cane for assistance with walking      PROCEDURE NOTE:        They will schedule a follow up in preoperatively

## 2020-09-09 ENCOUNTER — TELEPHONE (OUTPATIENT)
Dept: ORTHOPEDIC SURGERY | Age: 60
End: 2020-09-09

## 2020-09-09 RX ORDER — CEPHALEXIN 500 MG/1
500 CAPSULE ORAL SEE ADMIN INSTRUCTIONS
Qty: 4 CAPSULE | Refills: 0 | Status: SHIPPED | OUTPATIENT
Start: 2020-09-09 | End: 2020-11-23

## 2020-09-15 ENCOUNTER — HOSPITAL ENCOUNTER (OUTPATIENT)
Dept: PHYSICAL THERAPY | Age: 60
Setting detail: THERAPIES SERIES
Discharge: HOME | End: 2020-09-15
Payer: COMMERCIAL

## 2020-09-15 PROCEDURE — 97530 THERAPEUTIC ACTIVITIES: CPT

## 2020-09-15 PROCEDURE — 97161 PT EVAL LOW COMPLEX 20 MIN: CPT

## 2020-09-15 NOTE — PROGRESS NOTES
TOTAL JOINT - PREHAB ASSESSMENT FORM    Date:  9/15/2020    Patient Name:  Marcus Posadas    :  1960  HLD:[id number]    Restrictions/Precautions: Position Activity Restriction  Other position/activity restrictions: low fall risk    Pertinent Medical History:  L TKR    Medical/Treatment Diagnosis Information:  · Diagnosis: R knee OA  ·      Insurance/Certification information:  PT Insurance Information: [employer]- Med[facility]. Physician Information:  Referring Practitioner: Dr. Terell Fair     [] Total Hip Replacement [] Right  [] Left  DOS: 2020  [] Not yet scheduled  [x] Total Knee Replacement [x] Right  [] Left    [x] Prehab Eval  [] Prehab D/C  [] Post - OP Visit             Weeks from sx [] Post-op D/C    SUBJECTIVE:    Chart Reviewed: Yes     Referral Date : 20  Onset Date: 20  Subjective  Subjective: Pt here for R knee prehab appointment. Pain up to  5/10 with ADLs. DME ASSESSMENT:   Current available equipment:    [] Std. Ifeoma Sanchez       [x] Rolling walker      [] 4 wheeled walker     [] Bernardo Bosworth     [] Straight cane     [x] Crutches   [] Reacher            [] Sock Aid              [] Shower chair            [] Leg          [] Long handle shoe horn   [] Other:     Equipment needed at discharge from hospital:   [] Std. Ifeoma Wilcoxine       [] Rolling walker      [] 4 wheeled walker     [] Bernardo Bosworth     [x] Straight cane     [] Crutches   [] Reacher            [x] Sock Aid              [x] Shower chair            [x] Leg          [x] Long handle shoe horn   [] Other:     SOCIAL ASSESSMENT:  1. Will you live with someone who can care for you after surgery? [x] Yes  [] No  2. Where is the bathroom located in your post-surgery place of residence? [x] 1st Floor [x] 2nd Floor  3. Where is the bedroom located in your post-surgery place of residence? [] 1st Floor [x] 2nd Floor  4.  Do you use community supports (home help, meals-on-wheels, [employer] nurse)? [x] None or 1 per week  [] 2 or more per week  5. How many stairs will you have to climb to get in to your place of residence? [x] Less than 5  [] More than 5  6. Have you had a fall in the past year? [x] Yes  [] No     Notes:                                                                                  Available PT Visits:  Based on medical necessity     BMI:    Height 5'2   Weight 140       FUNCTIONAL ASSESSMENT:   1. Do you use ambulatory aids? [x] None [x] Single Point Cane  [] Crutches/Walker/Wheelchair  2. How far on average can you walk? [x] 2 Blocks or more  [] 1-2 Blocks  [] House bound most of the time  3. What is your physical activity level? [] Highly Active [x] Active  [] Somewhat active  [] Sedentary     Knee AROM (degrees) R L   Flexion (in supine) 135 136   Extension (use \"-\" to denote deficit) (long sitting, resting) 0 0     MMT (lbs) R L   Knee Extension (peak in seated) 35 40   Hip Abduction (peak in side lying) 37 30     Functional Testing (shoes on) Results   Timed Up and Go (TUG)                  (rounded seconds)  9   30 Second Sit-Stand Test                  (completed repetitions)  10   6 Minute Walk Test  (meters) 12.5     Standing Balance  (shoes on, rounded to the nearest seconds, 10 second max)     1. Stand with your feet side by side:   Time: 10          (sec)  2. Place the instep of one foot so it      Is touching the big toe of the surgical  Time:10      Foot (surgical foot in back)     (sec)     3. Place surgical foot behind so the toes  Time: 10      Are touching the heal of the other foot.   (sec)    4.  Stand on surgical foot   Time:9          (sec)       EXPECTED DISCHARGE DISPOSITION:                      [x] Home no PT  [] Home w/ OP PT                                               **If Discharge Disposition is to a facility, please indicate reason**  [] Home w/ [facility]                               [] Lack of home support                   [] Medical Comorbidities  [] SNF/Inpatient Rehab                                          [] Transportation                               [] Other          ASSESSMENT:       prior level of function; pt able to work and walk with less pain; DX: R knee OA  Decision Making:  low    Goals:      one session for hep independence           Plan:1 session for prehab measurements and hep independence          Signature:  Maye Cruz, MPT  [id number]

## 2020-09-15 NOTE — FLOWSHEET NOTE
Physical Therapy Daily Treatment Note  Date:  9/15/2020    Patient Name:  Britni Mcgregor    :  1960  MRN: [id number]    Restrictions/Precautions: Position Activity Restriction  Other position/activity restrictions: low fall risk    Pertinent Medical History: Additional Pertinent Hx: HTN, spinal fusion, HTN    Medical/Treatment Diagnosis Information:  · Diagnosis: R knee OA  · Treatment Diagnosis: pain, dec strength    Insurance/Certification information:  PT Insurance Information: [employer] [place]- Med. [facility]. Physician Information:  Referring Practitioner: Dr. Marquis Paulson of care signed (Y/N):  Sent to inbox    Visit# / total visits:    Pain level: 5/10     Functional Outcomes Measure: at eval  Test: LEFS  Score: 37    History of Injury: Subjective  Subjective: Pt here for R knee prehab appointment. Pain up to  5/10 with ADLs. Subjective:   See eval    Objective:   Observation:    Test measurements:      Exercises:  Exercise/Equipment Resistance/Repetitions Other comments   Pt issued Dr. Lashanda Hubbard recommended hep  Pt without questions                                                                         Other Therapeutic Activities:  Pt was educated on PT POC, Diagnosis, Prognosis, pathomechanics as well as frequency and duration of scheduling future physical therapy appointments. Time was also taken on this day to answer all patient questions and participation in PT. Reviewed appointment policy in detail with patient and patient verbalized understanding. Home Exercise Program: Patient was instructed in the above for HEP. Patient verbalized/demonstrated understanding and was issued written handout. Charges: Therapeutic Exercise:  [] (60844) Provided verbal/tactile cueing for activities to restore or maintain strength, flexibility, endurance, ROM for improvements with self-care, mobility, lifting and ambulation.     Neuromuscular Re-Education  [] (73177) Provided verbal/tactile cueing for activities to restore or maintain balance, coordination, kinesthetic sense, posture, motor skill, proprioception for self-care, mobility, lifting, and ambulation. Therapeutic Activities:    [] (92042) Provided verbal/tactile cueing to address functional limitations related to loss of mobility, strength, balance, and coordination. Gait Training:  [] (12065) Provided training and instruction to the patient for proper postural muscle recruitment and positioning with ambulation re-education     Home Exercise Program:    [x] (15803) Reviewed/Progressed HEP activities related to strengthening, flexibility, endurance, ROM for functional self-care, mobility, lifting and ambulation   [] (25378) Reviewed/Progressed HEP activities related to improving balance, coordination, kinesthetic sense, posture, motor skill, proprioception for self-care, mobility, lifting, and ambulation      Manual Treatments:  MFR / STM / Oscillations-Mobs:  G-I, II, III, IV / Manipulation / MLD  [] (35505) Provided manual therapy to mobilize  soft tissue/joints/fluid for the purpose of modulating pain, promoting relaxation, increasing ROM, reducing/eliminating soft tissue swelling/inflammation/restriction, improving soft tissue extensibility and allowing for proper ROM for normal function with self- care, mobility, lifting and ambulation.       Timed Code Treatment Minutes: 15   Total Treatment Minutes: 30     [x] EVAL (LOW) 29414   [] EVAL (MOD) 84601   [] EVAL (HIGH) 97522   [] RE-EVAL   [] TE (21596) x     [] Aquatic (21328) x  [] NMR (62833)   x  [] Aquatic Group (68310) x  [] Manual (86940) x    [] Ultrasound (45339) x  [x] TA (65823) x  [] Mech Traction (98182)  [] Ionto (01240)           [] ES (un) (61805):   [] Vasopump (89463) [] Other:      Assessment:  [x] Patient tolerated treatment well [] Patient limited by fatigue  [] Patient limited by pain  [] Patient limited by other medical complications  [] Other:        Progression Towards Functional goals:  [] Patient is progressing as expected towards functional goals listed. [] Progression is slowed due to complexities listed. [] Progression has been slowed due to co-morbidities.   [x] Plan just implemented, too soon to assess goals progression  [] Other:    Prognosis: [x] Good [] Fair  [] Poor    Goals:    Short term goals  Time Frame for Short term goals: 1 visit  Short term goal 1: Pt will show independence in HEP            Patient Requires Follow-up: [] Yes  [x] No    Plan:   [] Continue per plan of care [] Alter current plan (see comments)  [] Plan of care initiated [] Hold pending MD visit [x] Discharge    Plan for Next Session:  Add above as stated    Electronically signed by:  Shandra Mcdonough, [address] [facility],  [id number]

## 2020-09-15 NOTE — PROGRESS NOTES
Physical Therapy  Initial Assessment  Date: 9/15/2020  Patient Name: Marisela Williamson  MRN: [id number]  : 1960     Treatment Diagnosis: pain, dec strength    Restrictions  Position Activity Restriction  Other position/activity restrictions: low fall risk    Subjective   General  Chart Reviewed: Yes  Additional Pertinent Hx: HTN, spinal fusion, HTN  Family / Caregiver Present: No  Referring Practitioner: Dr. Sara Wong  Referral Date : 20  Diagnosis: R knee OA  PT Visit Information  Onset Date: 20  PT Insurance Information: [employer]. Subjective  Subjective: Pt here for R knee prehab appointment. Pain up to  5/10 with ADLs.           Objective     Observation/Palpation  Palpation: see prehab form  Observation: amb without limp without AD    PROM RLE (degrees)  RLE General PROM: see prehab form    Strength RLE  Comment: see prehab form        Assessment   Conditions Requiring Skilled Therapeutic Intervention  Body structures, Functions, Activity limitations: Increased pain;Decreased ADL status  Assessment: prior level of function; pt able to work and walk with less pain; DX: R knee OA  Treatment Diagnosis: pain, dec strength  Prognosis: Excellent  Decision Making: Low Complexity  History: see PMH  Exam: see eval  Clinical Presentation: stable  REQUIRES PT FOLLOW UP: No  Activity Tolerance  Activity Tolerance: Patient Tolerated treatment well         Plan   Plan  Times per week: 1  Plan weeks: 1  Specific instructions for Next Treatment: 1 session for prehab measurements and testing and hep  Current Treatment Recommendations: Home Exercise Program    OutComes Score  LEFS Total Score: 37 (09/15/20 0925)            Goals  Short term goals  Time Frame for Short term goals: 1 visit  Short term goal 1: Pt will show independence in HEP  Patient Goals   Patient goals : \"prehab hep\"       Rosio Call [phone]

## 2020-10-02 ENCOUNTER — PREP FOR PROCEDURE (OUTPATIENT)
Dept: ORTHOPEDIC SURGERY | Age: 60
End: 2020-10-02

## 2020-11-02 ENCOUNTER — OFFICE VISIT (OUTPATIENT)
Dept: ORTHOPEDIC SURGERY | Age: 60
End: 2020-11-02
Payer: COMMERCIAL

## 2020-11-02 VITALS — BODY MASS INDEX: 25.76 KG/M2 | WEIGHT: 140 LBS | HEIGHT: 62 IN | TEMPERATURE: 97.4 F

## 2020-11-02 PROCEDURE — 99214 OFFICE O/P EST MOD 30 MIN: CPT | Performed by: PHYSICIAN ASSISTANT

## 2020-11-02 PROCEDURE — MISC60 ORTHOTIC REFURBISHMENT 60: Performed by: ORTHOPAEDIC SURGERY

## 2020-11-02 NOTE — PROGRESS NOTES
New Patient: LUMBAR SPINE    Referring Provider:  No ref. provider found    CHIEF COMPLAINT:    Chief Complaint   Patient presents with    Back Pain       HISTORY OF PRESENT ILLNESS:     Ms. Parrish Adhikari  is a pleasant 63 y.o. female with PMH of L4-5 posterior fusion in 1996 here for consultation regarding her LBP and right leg pain. States her pain overall has never completely relieved since prior surgery. Her pain has steadily increased since then. She rates her back pain 6-7/10 and leg pain 5-10. She describes the pain as constant and aching that is worse with sitting, standing, and rising from sitting and better with leaning forward, walking, and lying down. She denies radiating leg pain today but does note localized bilateral knee pain which she is established with Dr. Tess Hansen. She admits numbness and tingling in her right foot. She denies progressive weakness of her leg and denies bowel or bladder dysfunction. She states she can sit for a maximum of one hour and stand for a maximum one hour. The pain mildly disrupts her sleep.      Current/Past Treatment:   · Physical Therapy: yes - distant past good/temporary relief  · Chiropractic:  yes   · Injection:  none   · Medications:  Elavil, flexeril     Past Medical History:   Past Medical History:   Diagnosis Date    HBP (high blood pressure)     Hyperlipidemia     Obstructive sleep apnea on CPAP     Plantar fasciitis     Seasonal allergies       Past Surgical History:     Past Surgical History:   Procedure Laterality Date    CARPAL TUNNEL RELEASE Right    [facility] SECTION  [id number], 1986, 1994    COLONOSCOPY  03/27/2018    Dr. Theresa Kingston with polyp    FOOT NEUROMA SURGERY Right     HAND SURGERY Left     Thumb joint    HYSTERECTOMY      LASIK      SHOULDER SURGERY      SPINAL FUSION      L4    TOTAL KNEE ARTHROPLASTY Left 5/26/2020    ROBOTIC ASSISTED LEFT TOTAL KNEE REPLACEMENT performed by Luberta Osler, MD at [place] [address]     Current Medications: Current Outpatient Medications:     cephALEXin (KEFLEX) 500 MG capsule, Take 1 capsule by mouth See Admin Instructions Take 2 tablets one hour before and 2 tablets one hour after dental procedure, Disp: 4 capsule, Rfl: 0    Armodafinil (NUVIGIL) 150 MG TABS tablet, one tab QAM prn, Disp: 30 tablet, Rfl: 5    doxepin (SILENOR) 3 MG TABS tablet, Take 1 tablet by mouth nightly, Disp: 30 tablet, Rfl: 5    aspirin EC 81 MG EC tablet, Take 1 tablet by mouth 2 times daily for 14 days Please avoid missing doses. , Disp: 28 tablet, Rfl: 0    losartan-hydrochlorothiazide (HYZAAR) 100-12.5 MG per tablet, TAKE 1 TABLET BY MOUTH ONCE DAILY, Disp: 90 tablet, Rfl: 3    amitriptyline (ELAVIL) 25 MG tablet, Take 1 tablet by mouth nightly, Disp: 90 tablet, Rfl: 3    atorvastatin (LIPITOR) 80 MG tablet, TAKE 1 TABLET BY MOUTH ONCE DAILY, Disp: 90 tablet, Rfl: 3    cyclobenzaprine (FLEXERIL) 5 MG tablet, Take 1 tablet by mouth daily, Disp: 90 tablet, Rfl: 3    fluticasone (FLONASE) 50 MCG/ACT nasal spray, 1 spray by Nasal route daily as needed , Disp: , Rfl:   Allergies:  Hydrocodone-acetaminophen; Vicodin [hydrocodone-acetaminophen]; Lisinopril; Sansert [methysergide]; and Toradol [ketorolac tromethamine]  Social History:    reports that she has never smoked. She has never used smokeless tobacco. She reports current alcohol use. She reports that she does not use drugs.   Family History:   Family History   Problem Relation Age of Onset    Arthritis Other     Asthma Other     Cancer Other     Diabetes Other     High Blood Pressure Other     Breast Cancer Mother     Other Mother        REVIEW OF SYSTEMS: Full ROS noted & scanned   CONSTITUTIONAL: Denies unexplained weight loss, fevers, chills or fatigue  NEUROLOGICAL: Denies unsteady gait or progressive weakness  MUSCULOSKELETAL: Denies joint swelling or redness  PSYCHOLOGICAL: Denies anxiety, depression   SKIN: Denies skin changes, delayed healing, rash, itching HEMATOLOGIC: Denies easy bleeding or bruising  ENDOCRINE: Denies excessive thirst, urination, heat/cold  RESPIRATORY: Denies current dyspnea, cough  GI: Denies nausea, vomiting, diarrhea   : Denies bowel or bladder issues      PHYSICAL EXAM:    Vitals: Temperature 97.4 °F (36.3 °C), temperature source Temporal, height 5' 2\" (1.575 m), weight 140 lb (63.5 kg), not currently breastfeeding. GENERAL EXAM:  · General Apparence: Patient is adequately groomed with no evidence of malnutrition. · Orientation: The patient is oriented to time, place and person. · Mood & Affect:The patient's mood and affect are appropriate. · Vascular: Examination reveals no swelling tenderness in upper or lower extremities. Good capillary refill. · Lymphatic: The lymphatic examination bilaterally reveals all areas to be without enlargement or induration  · Sensation: Sensation is intact without deficit  · Coordination/Balance: Good coordination     LUMBAR/SACRAL EXAMINATION:  · Inspection: Local inspection shows no step-off or bruising. Lumbar alignment is normal.  Sagittal and Coronal balance is neutral.      · Palpation:   No evidence of tenderness at the midline. No tenderness bilaterally at the paraspinal or trochanters. There is no step-off or paraspinal spasm. · Range of Motion: Lumbar flexion, extension and rotation are mildly limited due to pain. · Strength:   Strength testing is 5/5 in all muscle groups tested. · Special Tests:   Straight leg raise and crossed SLR negative. Leg length and pelvis level. · Skin: There are no rashes, ulcerations or lesions. Well healed surgical scar  · Reflexes: Reflexes are symmetrically 2+ at the patellar and ankle tendons. Clonus absent bilaterally at the feet.   · Gait & station: normal, patient ambulates without assistance    · Additional Examinations:   · RIGHT LOWER EXTREMITY: Inspection/examination of the right lower extremity does not show any tenderness, deformity or injury. Range of motion is unremarkable. There is no gross instability. There are no rashes, ulcerations or lesions. Strength and tone are normal.    · LEFT LOWER EXTREMITY:  Inspection/examination of the left lower extremity does not show any tenderness, deformity or injury. Range of motion is unremarkable. There is no gross instability. There are no rashes, ulcerations or lesions. Strength and tone are normal.    Diagnostic Testing:    Reviewed Lumbar MRI from 11/13/18 which note post surgical changes L4-5 with shallow, posterior disc bulging L1 through L4. Mild central canal narrowing L3-4. Impression:   S/p lumbar fusion L4-5 1996  Lumbar DDD    Plan:    She would like to proceed with home exercises and referral for PAXTON/nerve ablation. She will return PRN.     Keenan Mina PA-C

## 2020-11-05 ENCOUNTER — HOSPITAL ENCOUNTER (OUTPATIENT)
Dept: CT IMAGING | Age: 60
Discharge: HOME | End: 2020-11-05
Payer: COMMERCIAL

## 2020-11-05 PROCEDURE — 73700 CT LOWER EXTREMITY W/O DYE: CPT

## 2020-11-12 ENCOUNTER — OFFICE VISIT (OUTPATIENT)
Dept: ORTHOPEDIC SURGERY | Age: 60
End: 2020-11-12
Payer: COMMERCIAL

## 2020-11-12 VITALS — WEIGHT: 135 LBS | HEIGHT: 62 IN | BODY MASS INDEX: 24.84 KG/M2 | TEMPERATURE: 98.2 F

## 2020-11-12 PROCEDURE — 99213 OFFICE O/P EST LOW 20 MIN: CPT | Performed by: PHYSICIAN ASSISTANT

## 2020-11-12 PROCEDURE — 20610 DRAIN/INJ JOINT/BURSA W/O US: CPT | Performed by: PHYSICIAN ASSISTANT

## 2020-11-16 ENCOUNTER — HOSPITAL ENCOUNTER (OUTPATIENT)
Dept: PREADMISSION TESTING | Age: 60
Discharge: HOME | End: 2020-11-20
Payer: COMMERCIAL

## 2020-11-16 ENCOUNTER — OFFICE VISIT (OUTPATIENT)
Dept: ORTHOPEDIC SURGERY | Age: 60
End: 2020-11-16

## 2020-11-16 LAB
ABO/RH: NORMAL
ALBUMIN SERPL-MCNC: 4.8 G/DL (ref 3.4–5)
ANION GAP SERPL CALCULATED.3IONS-SCNC: 11 MMOL/L (ref 3–16)
ANTIBODY SCREEN: NORMAL
APTT: 30.4 SEC (ref 24.2–36.2)
BASOPHILS ABSOLUTE: 0.1 K/UL (ref 0–0.2)
BASOPHILS RELATIVE PERCENT: 0.6 %
BILIRUBIN URINE: NEGATIVE
BLOOD, URINE: NEGATIVE
BUN BLDV-MCNC: 18 MG/DL (ref 7–20)
CALCIUM SERPL-MCNC: 9.5 MG/DL (ref 8.3–10.6)
CHLORIDE BLD-SCNC: 102 MMOL/L (ref 99–110)
CLARITY: CLEAR
CO2: 30 MMOL/L (ref 21–32)
COLOR: YELLOW
CREAT SERPL-MCNC: 0.5 MG/DL (ref 0.6–1.2)
EKG ATRIAL RATE: 72 BPM
EKG DIAGNOSIS: NORMAL
EKG P AXIS: 66 DEGREES
EKG P-R INTERVAL: 176 MS
EKG Q-T INTERVAL: 396 MS
EKG QRS DURATION: 82 MS
EKG QTC CALCULATION (BAZETT): 433 MS
EKG R AXIS: 63 DEGREES
EKG T AXIS: 60 DEGREES
EKG VENTRICULAR RATE: 72 BPM
EOSINOPHILS ABSOLUTE: 0.1 K/UL (ref 0–0.6)
EOSINOPHILS RELATIVE PERCENT: 1.3 %
EPITHELIAL CELLS, UA: 0 /HPF (ref 0–5)
GFR AFRICAN AMERICAN: >60
GFR NON-AFRICAN AMERICAN: >60
GLUCOSE BLD-MCNC: 100 MG/DL (ref 70–99)
GLUCOSE URINE: NEGATIVE MG/DL
HCT VFR BLD CALC: 39.5 % (ref 36–48)
HEMOGLOBIN: 13.1 G/DL (ref 12–16)
HYALINE CASTS: 0 /LPF (ref 0–8)
INR BLD: 0.98 (ref 0.86–1.14)
KETONES, URINE: NEGATIVE MG/DL
LEUKOCYTE ESTERASE, URINE: ABNORMAL
LYMPHOCYTES ABSOLUTE: 3 K/UL (ref 1–5.1)
LYMPHOCYTES RELATIVE PERCENT: 33 %
MCH RBC QN AUTO: 30.1 PG (ref 26–34)
MCHC RBC AUTO-ENTMCNC: 33.2 G/DL (ref 31–36)
MCV RBC AUTO: 90.7 FL (ref 80–100)
MICROSCOPIC EXAMINATION: YES
MONOCYTES ABSOLUTE: 0.6 K/UL (ref 0–1.3)
MONOCYTES RELATIVE PERCENT: 6.7 %
NEUTROPHILS ABSOLUTE: 5.3 K/UL (ref 1.7–7.7)
NEUTROPHILS RELATIVE PERCENT: 58.4 %
NITRITE, URINE: NEGATIVE
PDW BLD-RTO: 13.7 % (ref 12.4–15.4)
PH UA: 6.5 (ref 5–8)
PLATELET # BLD: 261 K/UL (ref 135–450)
PMV BLD AUTO: 9.9 FL (ref 5–10.5)
POTASSIUM SERPL-SCNC: 3.4 MMOL/L (ref 3.5–5.1)
PREALBUMIN: 31.8 MG/DL (ref 20–40)
PROTEIN UA: NEGATIVE MG/DL
PROTHROMBIN TIME: 11.4 SEC (ref 10–13.2)
RBC # BLD: 4.35 M/UL (ref 4–5.2)
RBC UA: 1 /HPF (ref 0–4)
SEDIMENTATION RATE, ERYTHROCYTE: 7 MM/HR (ref 0–30)
SODIUM BLD-SCNC: 143 MMOL/L (ref 136–145)
SPECIFIC GRAVITY UA: 1.01 (ref 1–1.03)
URINE REFLEX TO CULTURE: ABNORMAL
URINE TYPE: ABNORMAL
UROBILINOGEN, URINE: 0.2 E.U./DL
VITAMIN D 25-HYDROXY: 40.9 NG/ML
WBC # BLD: 9.1 K/UL (ref 4–11)
WBC UA: 1 /HPF (ref 0–5)

## 2020-11-16 PROCEDURE — 82040 ASSAY OF SERUM ALBUMIN: CPT

## 2020-11-16 PROCEDURE — 82306 VITAMIN D 25 HYDROXY: CPT

## 2020-11-16 PROCEDURE — 84134 ASSAY OF PREALBUMIN: CPT

## 2020-11-16 PROCEDURE — 87641 MR-STAPH DNA AMP PROBE: CPT

## 2020-11-16 PROCEDURE — 85610 PROTHROMBIN TIME: CPT

## 2020-11-16 PROCEDURE — 85730 THROMBOPLASTIN TIME PARTIAL: CPT

## 2020-11-16 PROCEDURE — 93010 ELECTROCARDIOGRAM REPORT: CPT | Performed by: INTERNAL MEDICINE

## 2020-11-16 PROCEDURE — 93005 ELECTROCARDIOGRAM TRACING: CPT

## 2020-11-16 PROCEDURE — 85652 RBC SED RATE AUTOMATED: CPT

## 2020-11-16 PROCEDURE — 85025 COMPLETE CBC W/AUTO DIFF WBC: CPT

## 2020-11-16 PROCEDURE — 81001 URINALYSIS AUTO W/SCOPE: CPT

## 2020-11-16 PROCEDURE — 86900 BLOOD TYPING SEROLOGIC ABO: CPT

## 2020-11-16 PROCEDURE — 80048 BASIC METABOLIC PNL TOTAL CA: CPT

## 2020-11-16 PROCEDURE — 86901 BLOOD TYPING SEROLOGIC RH(D): CPT

## 2020-11-16 PROCEDURE — 86850 RBC ANTIBODY SCREEN: CPT

## 2020-11-16 PROCEDURE — 83036 HEMOGLOBIN GLYCOSYLATED A1C: CPT

## 2020-11-16 PROCEDURE — 99024 POSTOP FOLLOW-UP VISIT: CPT | Performed by: ORTHOPAEDIC SURGERY

## 2020-11-16 NOTE — PROGRESS NOTES
Patient is a 63 y.o. female who is here to discuss surgical options regarding their painful joint. We went over the risks and complications of joint replacement including; bleeding, infection, decreased ROM, continued pain, instability, fracture, dislocation, neurovascular injury, post op cognitive disorder, leg length discrepancy, DVT, pulmonary embolism and need for further surgical procedures. We also discussed the choice of implant and the hardware model was reviewed with the patient. The patient understands these issues and we will see the patient in the operating room or in another pre operative visit. I had a 45 minute face-to-face group discussion with greater than 50% of the time counseling the patient regarding joint arthroplasty, post operative physical therapy, and pain management. LMP  (LMP Unknown)       As this patient has demonstrated risk factors for osteoporosis, such as age greater than [de-identified] years and evidence of a fracture, I have referred the patient back to the primary care physician for evaluation for osteoporosis, including consideration for DEXA scanning, if this is felt to be clinically indicated. The patient is advised to contact the primary care physician to follow-up for further evaluation. Plan:   right sided total knee arthroplasty.: The date of surgery is tenatively scheduled on 12/2/20.

## 2020-11-16 NOTE — PROGRESS NOTES
Patient attended JET class on 11/16/20, arrived late. Patient verified surgery for Total knee replacement and received patient information and educational JET folder including education handouts on hand hygiene and preventing constipation. Interviews completed by PT, OT, Case management and PAT. Labs and Tests completed as ordered/necessary. Patient given handout instructions on Pre-operative Showering techniques and the use of anti-septic 3 days before surgery. Anti-septic bottle given to patient to take home. Patient states no further questions or concerns. DOS: 12/2/20  Dr Kumar Prost: same day surgery - home with [facility] and  [facility]is ;if applicable. Per Patient Will see/Saw PCP on 11/23/20.        Electronically signed by Anthony Parrish RN on 11/16/2020 at 2:13 PM

## 2020-11-17 LAB
ESTIMATED AVERAGE GLUCOSE: 114 MG/DL
HBA1C MFR BLD: 5.6 %
MRSA SCREEN RT-PCR: NORMAL

## 2020-11-17 NOTE — CARE COORDINATION
DATE OF JET CLASS INTERVIEW:  11/17/2020    ACCOMPANIED TODAY BY:  Phone Interview    FIRST JOINT REPLACEMENT? No  TYPE AND DATE OF LAST JOINT REPLACEMENT? Left knee May 2020    [employer] [employer]  [phone]    STEPS INTO HOME?  2 steps - no handrail    STEPS TO BATHROOM/BEDROOM?  13 steps    DME:   Has walker. Using card table chair for shower. Does not need raised toilet seat. CHOICES FOR HHC, DME VENDORS AND SKILLED/ REHAB FACILITIES PROVIDED TO PT DURING THIS INTERVIEW. DISCHARGE PLAN:/ INCLUDING WHO WILL BE STAYING WITH YOU AT HOME? , Nicanor Castillo and family will help. LENGTH OF STAY HAS BEEN DISCUSSED WITH THE PT, APPROPRIATE TO HIS/ HER PROCEDURE. PT HAS BEEN INFORMED THAT THEY WILL BE DISCHARGED WHEN THE PHYSICIAN DEEMS THEM MEDICALLY READY. MOST PATIENTS CAN EXPECT  TO BE IN THE HOSPITAL ONE NIGHT AS AN OBSERVATION ONLY, OR 1-2 DAYS AS AN ADMISSION FOR THOSE WITH MEDICAL HEALTH  ISSUES/COMPLICATIONS. HOME CARE:  [facility]    SNF/REHAB: n/a        PT AGREEABLE TO MEDS TO BEDS FROM [facility]. Agreeable to meds to bed program.    Contact information for case management provided to pt. Will follow with therapies and social service.     Electronically signed by Raman Valdez on 11/17/2020 at 1:12 PM

## 2020-11-18 ENCOUNTER — TELEPHONE (OUTPATIENT)
Dept: ORTHOPEDIC SURGERY | Age: 60
End: 2020-11-18

## 2020-11-23 ENCOUNTER — OFFICE VISIT (OUTPATIENT)
Dept: FAMILY MEDICINE CLINIC | Age: 60
End: 2020-11-23
Payer: COMMERCIAL

## 2020-11-23 VITALS
HEART RATE: 74 BPM | BODY MASS INDEX: 25.24 KG/M2 | TEMPERATURE: 96.4 F | RESPIRATION RATE: 16 BRPM | OXYGEN SATURATION: 98 % | DIASTOLIC BLOOD PRESSURE: 98 MMHG | SYSTOLIC BLOOD PRESSURE: 160 MMHG | WEIGHT: 138 LBS

## 2020-11-23 PROCEDURE — 99214 OFFICE O/P EST MOD 30 MIN: CPT | Performed by: FAMILY MEDICINE

## 2020-11-23 NOTE — PROGRESS NOTES
Preop PE at request of Dr. Lesli Grigsby for right TKR at [facility] on [address] . ROS : No new complaints. Patient denies chest pain, shortness of breath, or fever. PAST MEDICAL & SURGICAL HISTORY  Patient Active Problem List   Diagnosis    [facility] DJD(carpometacarpal degenerative joint disease), localized primary    Fibromyalgia    Essential hypertension    Mixed hyperlipidemia    Dysthymia    Metatarsalgia of right foot    History of total knee arthroplasty, left    Left shoulder pain    Rotator cuff tendonitis, left    DAVID (obstructive sleep apnea)     Past Surgical History:   Procedure Laterality Date    CARPAL TUNNEL RELEASE Right      SECTION  9934,4055, ,     COLONOSCOPY  2018    Dr. Denise Hearing with polyp    FOOT NEUROMA SURGERY Right     HAND SURGERY Left     Thumb joint    HYSTERECTOMY      LASIK      SHOULDER SURGERY      SPINAL FUSION      L4    TOTAL KNEE ARTHROPLASTY Left 2020    ROBOTIC ASSISTED LEFT TOTAL KNEE REPLACEMENT performed by Roma Almeida MD at [place] [address]  Current Outpatient Medications   Medication Sig Dispense Refill    Armodafinil (NUVIGIL) 150 MG TABS tablet one tab QAM prn 30 tablet 5    amitriptyline (ELAVIL) 25 MG tablet Take 1 tablet by mouth nightly 90 tablet 3    cyclobenzaprine (FLEXERIL) 5 MG tablet Take 1 tablet by mouth daily 90 tablet 3    fluticasone (FLONASE) 50 MCG/ACT nasal spray 1 spray by Nasal route daily as needed       metoprolol tartrate (LOPRESSOR) 25 MG tablet Take 1 tablet by mouth 2 times daily 180 tablet 3    losartan-hydroCHLOROthiazide (HYZAAR) 100-12.5 MG per tablet TAKE 1 TABLET BY MOUTH ONCE DAILY 90 tablet 3    doxepin (SILENOR) 3 MG TABS tablet Take 1 tablet by mouth nightly 90 tablet 3    atorvastatin (LIPITOR) 80 MG tablet TAKE 1 TABLET BY MOUTH ONCE DAILY 90 tablet 3     No current facility-administered medications for this visit.         ALLERGIES  Allergies   Allergen Reactions    Hydrocodone-Acetaminophen Shortness Of Breath    Vicodin [Hydrocodone-Acetaminophen] Shortness Of Breath    Lisinopril      cough    Sansert [Methysergide]      Extreme weakness    Toradol [Ketorolac Tromethamine] Nausea Only       Social History     Tobacco Use    Smoking status: Never Smoker    Smokeless tobacco: Never Used   Substance Use Topics    Alcohol use: Yes     Comment: monthly, occasional drink    Drug use: No        Family History   Problem Relation Age of Onset    Arthritis Other     Asthma Other     Cancer Other     Diabetes Other     High Blood Pressure Other     Breast Cancer Mother     Other Mother         BP (!) 160/98   Pulse 74   Temp 96.4 °F (35.8 °C)   Resp 16   Wt 138 lb (62.6 kg)   LMP  (LMP Unknown)   SpO2 98%   BMI 25.24 kg/m²   General - alert and oriented; speaking easily in complete sentences  Skin - no rash or jaundice  Neck - No lymphadenopathy. No thyromegaly. No bruit. Lungs - clear to ascultation  Heart - Regular rate and rhythm, No murmur. No gallop. No rub. Abdomen - Abdomen soft, non-tender. BS normal. No masses. Extremities - no edema. Distal perfusion palpable. EKG: Normal sinus rhythm unchanged         Diagnosis Orders   1. Primary osteoarthritis of right knee      OK for surgery. Labs and EKG reviewed   2. Preop examination      as above   3.  Essential hypertension      add metoprolol 25 BID recheck 4 days; recheck ater 48 hours (11/25) /60 cont metoprolol OK for surgery

## 2020-11-25 ENCOUNTER — OFFICE VISIT (OUTPATIENT)
Dept: FAMILY MEDICINE CLINIC | Age: 60
End: 2020-11-25
Payer: COMMERCIAL

## 2020-11-25 ENCOUNTER — PREP FOR PROCEDURE (OUTPATIENT)
Dept: ORTHOPEDICS UNIT | Age: 60
End: 2020-11-25

## 2020-11-25 VITALS
SYSTOLIC BLOOD PRESSURE: 132 MMHG | DIASTOLIC BLOOD PRESSURE: 60 MMHG | BODY MASS INDEX: 25.42 KG/M2 | OXYGEN SATURATION: 98 % | WEIGHT: 139 LBS | HEART RATE: 63 BPM | TEMPERATURE: 96.2 F | RESPIRATION RATE: 16 BRPM

## 2020-11-25 PROBLEM — G47.33 OSA (OBSTRUCTIVE SLEEP APNEA): Status: ACTIVE | Noted: 2020-11-25

## 2020-11-25 PROCEDURE — 99213 OFFICE O/P EST LOW 20 MIN: CPT | Performed by: FAMILY MEDICINE

## 2020-11-25 RX ORDER — DOXEPIN HYDROCHLORIDE 3 MG/1
3 TABLET ORAL NIGHTLY
Qty: 90 TABLET | Refills: 3 | Status: SHIPPED
Start: 2020-11-25 | End: 2021-01-26 | Stop reason: SINTOL

## 2020-11-25 RX ORDER — LOSARTAN POTASSIUM AND HYDROCHLOROTHIAZIDE 12.5; 1 MG/1; MG/1
TABLET ORAL
Qty: 90 TABLET | Refills: 3 | Status: SHIPPED | OUTPATIENT
Start: 2020-11-25 | End: 2021-12-23

## 2020-11-25 RX ORDER — ATORVASTATIN CALCIUM 80 MG/1
TABLET, FILM COATED ORAL
Qty: 90 TABLET | Refills: 3 | Status: SHIPPED | OUTPATIENT
Start: 2020-11-25 | End: 2022-06-22

## 2020-11-25 NOTE — PROGRESS NOTES
Chief Complaint   Patient presents with    Hypertension    Follow-up     Family History   Problem Relation Age of Onset    Arthritis Other     Asthma Other     Cancer Other     Diabetes Other     High Blood Pressure Other     Breast Cancer Mother     Other Mother      Social History     Socioeconomic History    Marital status:      Spouse name: Not on file    Number of children: Not on file    Years of education: Not on file    Highest education level: Not on file   Occupational History    Not on file   Social Needs    Financial resource strain: Not on file    Food insecurity     Worry: Not on file     Inability: Not on file    Transportation needs     Medical: Not on file     Non-medical: Not on file   Tobacco Use    Smoking status: Never Smoker    Smokeless tobacco: Never Used   Substance and Sexual Activity    Alcohol use: Yes     Comment: monthly, occasional drink    Drug use: No    Sexual activity: Yes     Partners: Male     Comment: hysterectomy   Lifestyle    Physical activity     Days per week: Not on file     Minutes per session: Not on file    Stress: Not on file   Relationships    Social connections     Talks on phone: Not on file     Gets together: Not on file     Attends Bahai service: Not on file     Active member of club or organization: Not on file     Attends meetings of clubs or organizations: Not on file     Relationship status: Not on file    Intimate partner violence     Fear of current or ex partner: Not on file     Emotionally abused: Not on file     Physically abused: Not on file     Forced sexual activity: Not on file   Other Topics Concern    Not on file   Social History Narrative    Not on file       Current Outpatient Medications:     metoprolol tartrate (LOPRESSOR) 25 MG tablet, Take 1 tablet by mouth 2 times daily, Disp: 180 tablet, Rfl: 3    losartan-hydroCHLOROthiazide (HYZAAR) 100-12.5 MG per tablet, TAKE 1 TABLET BY MOUTH ONCE DAILY, Disp: 90 tablet, Rfl: 3    doxepin (SILENOR) 3 MG TABS tablet, Take 1 tablet by mouth nightly, Disp: 90 tablet, Rfl: 3    atorvastatin (LIPITOR) 80 MG tablet, TAKE 1 TABLET BY MOUTH ONCE DAILY, Disp: 90 tablet, Rfl: 3    Armodafinil (NUVIGIL) 150 MG TABS tablet, one tab QAM prn, Disp: 30 tablet, Rfl: 5    amitriptyline (ELAVIL) 25 MG tablet, Take 1 tablet by mouth nightly, Disp: 90 tablet, Rfl: 3    cyclobenzaprine (FLEXERIL) 5 MG tablet, Take 1 tablet by mouth daily, Disp: 90 tablet, Rfl: 3    fluticasone (FLONASE) 50 MCG/ACT nasal spray, 1 spray by Nasal route daily as needed , Disp: , Rfl:   Allergies   Allergen Reactions    Hydrocodone-Acetaminophen Shortness Of Breath    Vicodin [Hydrocodone-Acetaminophen] Shortness Of Breath    Lisinopril      cough    Sansert [Methysergide]      Extreme weakness    Toradol [Ketorolac Tromethamine] Nausea Only       Patient Active Problem List   Diagnosis    [facility] DJD(carpometacarpal degenerative joint disease), localized primary    Fibromyalgia    Essential hypertension    Mixed hyperlipidemia    Dysthymia    Metatarsalgia of right foot    History of total knee arthroplasty, left    Left shoulder pain    Rotator cuff tendonitis, left    DAVID (obstructive sleep apnea)       HPI / ROS: Rajendra Solanoean presents for evaluation and management of :    # HTN - rigoberto meds no CP/SOB now at goal on BB bid  Lab Results   Component Value Date     11/16/2020    K 3.4 11/16/2020     11/16/2020    CO2 30 11/16/2020    BUN 18 11/16/2020    CREATININE 0.5 11/16/2020    GLUCOSE 100 11/16/2020    CALCIUM 9.5 11/16/2020       # Depression/dysthymia - always tired in AM. Treats DAVID with cpap she reports WE discuss her prior use of Novigil which I am not comfortable wth    # DAVID using cpap faithfully reported    Wt Readings from Last 3 Encounters:   11/25/20 139 lb (63 kg)   11/23/20 138 lb (62.6 kg)   11/12/20 135 lb (61.2 kg)         A&o  /60   Pulse 63   Temp 96.2 °F (35.7 °C)   Resp 16   Wt 139 lb (63 kg)   LMP  (LMP Unknown)   SpO2 98%   BMI 25.42 kg/m²   Neck no bruit no TMg  Car reg no MGR  Lungs cta  Ext no edema       Diagnosis Orders   1. Dysthymia      bibliotherapy for \"mornng depression we agree to revisit after surgery in JAN   2. Insomnia, unspecified type  doxepin (SILENOR) 3 MG TABS tablet    OK RF doxepin   3. Essential hypertension      Improved on B and other med cont; OK for surgery. 4. DAVID (obstructive sleep apnea)      by hx and states using cpap     No orders of the defined types were placed in this encounter.

## 2020-11-25 NOTE — PATIENT INSTRUCTIONS
Low Carb Eating with Intermittent Fasting    target < 100 grams of carb daily; ZERO grained based carbs  Get only carbs from whole fruits - strawberries, raspberries, blackberries, apples, oranges, pineapples, bananas etc.    Intermittent Fasting / Eating Window  Only eat between noon and 8 PM  Water any time  Coffee with cream and no more than 1 teaspoon sugar OK in AM    1) Wheat Belly by Diane Parikh MD ([email or web address])  2) The Primal Blueprint by Coreen Valle ([email or web address] - review success stories)  2) Living Paleo For Dummies

## 2020-11-27 ENCOUNTER — OFFICE VISIT (OUTPATIENT)
Dept: PRIMARY CARE CLINIC | Age: 60
End: 2020-11-27
Payer: COMMERCIAL

## 2020-11-27 PROCEDURE — 99211 OFF/OP EST MAY X REQ PHY/QHP: CPT | Performed by: NURSE PRACTITIONER

## 2020-11-27 NOTE — PROGRESS NOTES
Patient presented to [facility] drive up clinic for preop testing. Patient was swabbed and given information advising them to remain isolated until procedure date.

## 2020-11-27 NOTE — DISCHARGE INSTR - COC
[facility] ([facility]) Continuity of Care Form    Patient Name:  Amy Alvarez  : 1960    MRN:  [id number]    Admit date:  (Not on file)  Discharge date:  2020    Code Status Order: No Order  Advance Directives: No    Admitting Physician: Anna Marie Be MD  PCP: Kip Ceballos MD    Discharging Nurse: [facility] Unit/Room#: No information available for this encounter. Discharging Unit Phone Number: [phone]    Emergency Contact:        Past Surgical History:  Past Surgical History:   Procedure Laterality Date    CARPAL TUNNEL RELEASE Right    [facility] SECTION  9600,6365, 1986, 1994    COLONOSCOPY  2018    Dr. Jad Yost with polyp    FOOT NEUROMA SURGERY Right     HAND SURGERY Left     Thumb joint    HYSTERECTOMY      LASIK      SHOULDER SURGERY      SPINAL FUSION      L4    TOTAL KNEE ARTHROPLASTY Left 2020    ROBOTIC ASSISTED LEFT TOTAL KNEE REPLACEMENT performed by Anna Marie Be MD at [place] [address]       Immunization History:   Immunization History   Administered Date(s) Administered    Hepatitis A Adult (Havrix, Vaqta) 2012, 2012    Hepatitis B 2012, 2012, 2012    Influenza Virus Vaccine 10/25/2008, 09/10/2009, 10/08/2012, 10/24/2013, 2014, 2015, 2016, 2019    Influenza, Quadv, IM, PF (6 mo and older Fluzone, Flulaval, Fluarix, and 3 yrs and older Afluria) 2017    Tdap (Boostrix, Adacel) 10/05/2011    Zoster Recombinant (Shingrix) 2018, 2019       Active Problems:  Active Problems:    * No active hospital problems. *  Resolved Problems:    * No resolved hospital problems.  *      Isolation/Infection:       Nurse Assessment:  Last Vital Signs:LMP  (LMP Unknown)   Last documented pain score (0-10 scale):    Last Weight:   Wt Readings from Last 1 Encounters:   20 139 lb (63 kg)     Mental Status:  oriented and alert     IV Access:  - None    Nursing Mobility/ADLs:  Walking Assisted  Transfer  Assisted  Bathing  Assisted  Dressing  Assisted  Toileting  Assisted  Feeding  Independent  Med Admin  Independent  Med Delivery   whole    Wound Care Documentation and Therapy:  Keep glued Prineo dressing clean and intact. DO NOT remove. Prineo is waterproof for showering. Doctor will evaluated dressing for removal at office visit 2 weeks after surgery        Elimination:  Urinary Catheter: None   Colostomy/Ileostomy: No  Continence:   · Bowel: Yes  · Bladder: Yes  Date of Last BM: ***    Intake/Output Summary (Last 24 hours)   No intake or output data in the 24 hours ending 11/27/20 1015  Safety Concerns: At Risk for Falls    Impairments/Disabilities:      Vision    Nutrition Therapy:  Current Nutrition Therapy: No diet orders on file  Routes of Feeding: Oral  Liquids: No Restrictions  Daily Fluid Restriction: no  Last Modified Barium Swallow with Video (Video Swallowing Test): not done    Treatments at the Time of Hospital Discharge:   Respiratory Treatments:   Oxygen Therapy:  is not on home oxygen therapy.   Ventilator:    - No ventilator support    Lab orders for discharge:    Rehab Therapies: Physical Therapy, Occupational Therapy   Weight Bearing Status/Restrictions: No weight bearing restirctions  Other Medical Equipment (for information only, NOT a DME order):  Rolling walker  Other Treatments: ASA 81mg twice at day for 14 days for DVT prophylaxis , bilateral THELMA hose for 2 weeks after surgery    Patient's personal belongings (please select all that are sent with patient):  Jing    RN SIGNATURE:  Electronically signed by Raquel Duval RN on 12/2/20 at 3:15 PM EST    PHYSICIAN SECTION    Prognosis: Good    Condition at Discharge: Stable    Rehab Potential (if transferring to Rehab): Good    Physician Certification: I certify the above orders, information, and transfer of Pilar Roy is necessary for the continuing treatment of the diagnosis listed and that he requires {Admit to Appropriate Level of Care:78126:::0} for {GREATER/LESS:737494992} 30 days. Update Admission H&P: No change in H&P    PHYSICIAN SIGNATURE:  Electronically signed by MINERVA Bland CNP on 11/30/20 at 10:16 AM SALLY/ Dr Anika Nugent Status Date: ***    [facility] Readmission Risk Assessment Score:    Discharging to Facility/ Agency   Name:  [facility] care    Address: [address] [place] [address]., [address] [place] [address]., [place] [address]  Phone: [phone]  Fax: [phone]    / signature: {Esignature:[id number]:::0}      Followup with Dr Mai Rivero 2 weeks after surgery in office   [phone]  [facility] and [facility], [address] [place] [address],   [phone]     DISCHARGE INSTRUCTIONS FOR TOTAL KNEE REPLACEMENT  Activity:   Elevate your leg if swelling occurs in your ankle. Use elastic wraps/hose until swelling decreases.  Continue the exercise program as prescribed by physical therapists.  Take frequent walks.  Use walker, crutches, or cane with weight bearing instructions as indicated by the physical therapists.  Take rest periods often. Elevate leg during rest period. Wound Care:   Cover the wound with a sterile gauze dressing and change daily as long as there is drainage.  Do not scrub wound. Pat it dry with a soft towel.  Dont apply any lotions or creams to your wound.  Check the incision every day for redness, swelling, or increase in drainage. Diet:   You can resume your normal diet. There are no limits on your diet due to your surgery.  Pain pills and activity changes may lead to constipation. To prevent this, use prune juice or bran cereals liberally. You may need to use a laxative such as Dulcolax, Senokot, or Milk of Magnesia.  Drink plenty of fluids. Medications:   Take pain pills if needed to maintain comfort.  Never drive while taking pain medicine.    Avoid over the counter medications until checking with your doctor.  Resume previous medications as instructed by your doctor. You will be on aspirin or Eliquis  for 14 days only. Stay off other anti-inflammatory medications (except Celebrex)  Call Your Doctor If:  Teresa Valenzuela You have increased pain not controlled by medications.  Excessive swelling in your ankle.  You develop numbness, tingling, or decreased movement.  You have a fever greater than 100 degrees for a day or over 101 degrees at any one time.  Your wound becomes more reddened, starts draining, or opens.  If you fall. You have any questions about your recovery. Inform your family doctor/dentist or any other doctor who cares for you in the future that you have a joint replacement. You may need antibiotics for dental or surgical procedures if there is any evidence of infection present. ? If you have required the use of insulin to control your blood sugar after surgery, follow up with your family doctor. ? Call your surgeons office to schedule your appointment to be seen after surgery. ? Make your appointment to continue physical therapy per doctors orders. ?  Smoking cessation assistance can be obtained from your family doctor or by calling [place] @ [phone]    _______________________________   _____   _______________________  ____                Patient Signature              Date      Witness                               Date

## 2020-11-28 LAB — SARS-COV-2: NOT DETECTED

## 2020-11-30 RX ORDER — CELECOXIB 200 MG/1
200 CAPSULE ORAL ONCE
Status: CANCELLED | OUTPATIENT
Start: 2020-11-30 | End: 2020-11-30

## 2020-11-30 NOTE — PROGRESS NOTES
Preoperative Screening for Elective Surgery/Invasive Procedures While COVID-19 present in the community     Have you tested positive or have been told to self-isolate for COVID-19 like symptoms within the past 28 days? n   Do you currently have any of the following symptoms? n  o Fever >100.0 F or 99.9 F in immunocompromised patients?n  o New onset cough, shortness of breath or difficulty breathing?n  o New onset sore throat, myalgia (muscle aches and pains), headache, loss of taste/smell or diarrhea?n   Have you had a potential exposure to COVID-19 within the past 14 days by:  o Close contact with a confirmed case? n  o Close contact with a healthcare worker,  or essential infrastructure worker (grocery store, [employer], gas station, public utilities or transportation)? YES  o Do you reside in a congregate setting such as; skilled nursing facility, adult home, correctional facility, homeless shelter or other institutional setting? n  o Have you had recent travel to a known COVID-19 hotspot? [place] [facility] [place] the patient has a positive screen by answering yes to one or more of the above questions. Patients who test positive or screen positive prior to surgery or on the day of surgery should be evaluated in conjunction with the surgeon/proceduralist/anesthesiologist to determine the urgency of the procedure.

## 2020-11-30 NOTE — PROGRESS NOTES
C-Difficile admission screening and protocol:     * Admitted with diarrhea? n     *Prior history of C-Diff. In last 3 months?n     *Antibiotic use in the past 6-8 weeks?n     *Prior hospitalization or nursing home in the last month? n       [address] time____6________        Surgery time____________    Take the following medications with a sip of water:    Do not eat or drink anything after 12:00 midnight prior to your surgery. This includes water chewing gum, mints and ice chips. You may brush your teeth and gargle the morning of your surgery, but do not swallow the water     Please see your family doctor/pediatrician for a history and physical and/or concerning medications. Bring any test results/reports from your physicians office. If you are under the care of a heart doctor or specialist doctor, please be aware that you may be asked to them for clearance    You may be asked to stop blood thinners such as Coumadin, Plavix, Fragmin, Lovenox, etc., or any anti-inflammatories such as:  Aspirin, Ibuprofen, Advil, Naproxen prior to your surgery. We also ask that you stop any OTC medications such as fish oil, vitamin E, glucosamine, garlic, Multivitamins, COQ 10, etc.    We ask that you do not smoke 24 hours prior to surgery  We ask that you do not  drink any alcoholic beverages 24 hours prior to surgery     You must make arrangements for a responsible adult to take you home after your surgery. For your safety you will not be allowed to leave alone or drive yourself home. Your surgery will be cancelled if you do not have a ride home. Also for your safety, it is strongly suggested that someone stay with you the first 24 hours after your surgery. A parent or legal guardian must accompany a child scheduled for surgery and plan to stay at the hospital until the child is discharged. Please do not bring other children with you.     For your comfort, please wear simple loose fitting clothing to the hospital.  Please do not bring valuables. Do not wear any make-up or nail polish on your fingers or toes      For your safety, please do not wear any jewelry or body piercing's on the day of surgery. All jewelry must be removed. If you have dentures, they will be removed before going to operating room. For your convenience, we will provide you with a container. If you wear contact lenses or glasses, they will be removed, please bring a case for them. If you have a living will and a durable power of  for healthcare, please bring in a copy. As part of our patient safety program to minimize surgical site infections, we ask you to do the following:    · Please notify your surgeon if you develop any illness between         now and the  day of your surgery. · This includes a cough, cold, fever, sore throat, nausea,         or vomiting, and diarrhea, etc.  ·  Please notify your surgeon if you experience dizziness, shortness         of breath or blurred vision between now and the time of your surgery. Do not shave your operative site 96 hours prior to surgery. For face and neck surgery, men may use an electric razor 48 hours   prior to surgery. You may shower the night before surgery or the morning of   your surgery with an antibacterial soap. You will need to bring a photo ID and insurance card    [facility] has an onsite pharmacy, would you like to utilize our pharmacy     If you will be staying overnight and use a C-pap machine, please bring   your C-pap to hospital     Our goal is to provide you with excellent care, therefore, visitors will be limited to two(2) in the room at a time so that we may focus on providing this care for you. Please contact pre-admission testing if you have any further questions.                  [facility] phone number:  [address] PAT fax number:  [phone]  Please note these are generalized instructions for all surgical cases, you may be provided with more specific instructions according to your surgery.

## 2020-12-01 ENCOUNTER — ANESTHESIA EVENT (OUTPATIENT)
Dept: OPERATING ROOM | Age: 60
End: 2020-12-01
Payer: COMMERCIAL

## 2020-12-02 ENCOUNTER — ANESTHESIA (OUTPATIENT)
Dept: OPERATING ROOM | Age: 60
End: 2020-12-02
Payer: COMMERCIAL

## 2020-12-02 ENCOUNTER — APPOINTMENT (OUTPATIENT)
Dept: GENERAL RADIOLOGY | Age: 60
End: 2020-12-02
Attending: ORTHOPAEDIC SURGERY
Payer: COMMERCIAL

## 2020-12-02 ENCOUNTER — HOSPITAL ENCOUNTER (OUTPATIENT)
Age: 60
Setting detail: SURGERY ADMIT
Discharge: HOME | End: 2020-12-02
Attending: ORTHOPAEDIC SURGERY | Admitting: ORTHOPAEDIC SURGERY
Payer: COMMERCIAL

## 2020-12-02 VITALS
RESPIRATION RATE: 2 BRPM | DIASTOLIC BLOOD PRESSURE: 57 MMHG | SYSTOLIC BLOOD PRESSURE: 117 MMHG | OXYGEN SATURATION: 95 % | TEMPERATURE: 95.9 F

## 2020-12-02 VITALS
OXYGEN SATURATION: 97 % | RESPIRATION RATE: 16 BRPM | WEIGHT: 137.79 LBS | DIASTOLIC BLOOD PRESSURE: 66 MMHG | SYSTOLIC BLOOD PRESSURE: 114 MMHG | HEART RATE: 79 BPM | TEMPERATURE: 97.3 F | HEIGHT: 62 IN | BODY MASS INDEX: 25.36 KG/M2

## 2020-12-02 PROBLEM — M17.11 ARTHRITIS OF RIGHT KNEE: Status: ACTIVE | Noted: 2020-12-02

## 2020-12-02 LAB
ABO/RH: NORMAL
ANTIBODY SCREEN: NORMAL
GLUCOSE BLD-MCNC: 103 MG/DL (ref 70–99)
PERFORMED ON: ABNORMAL

## 2020-12-02 PROCEDURE — 2500000003 HC RX 250 WO HCPCS: Performed by: NURSE PRACTITIONER

## 2020-12-02 PROCEDURE — 2500000003 HC RX 250 WO HCPCS: Performed by: NURSE ANESTHETIST, CERTIFIED REGISTERED

## 2020-12-02 PROCEDURE — 6370000000 HC RX 637 (ALT 250 FOR IP): Performed by: NURSE PRACTITIONER

## 2020-12-02 PROCEDURE — 6360000002 HC RX W HCPCS: Performed by: ORTHOPAEDIC SURGERY

## 2020-12-02 PROCEDURE — 86901 BLOOD TYPING SEROLOGIC RH(D): CPT

## 2020-12-02 PROCEDURE — 88311 DECALCIFY TISSUE: CPT

## 2020-12-02 PROCEDURE — 3600000005 HC SURGERY LEVEL 5 BASE: Performed by: ORTHOPAEDIC SURGERY

## 2020-12-02 PROCEDURE — 2580000003 HC RX 258: Performed by: ANESTHESIOLOGY

## 2020-12-02 PROCEDURE — 2709999900 HC NON-CHARGEABLE SUPPLY: Performed by: ORTHOPAEDIC SURGERY

## 2020-12-02 PROCEDURE — C1713 ANCHOR/SCREW BN/BN,TIS/BN: HCPCS | Performed by: ORTHOPAEDIC SURGERY

## 2020-12-02 PROCEDURE — 6360000002 HC RX W HCPCS: Performed by: NURSE PRACTITIONER

## 2020-12-02 PROCEDURE — 2720000010 HC SURG SUPPLY STERILE: Performed by: ORTHOPAEDIC SURGERY

## 2020-12-02 PROCEDURE — 2580000003 HC RX 258: Performed by: NURSE ANESTHETIST, CERTIFIED REGISTERED

## 2020-12-02 PROCEDURE — 7100000011 HC PHASE II RECOVERY - ADDTL 15 MIN: Performed by: ORTHOPAEDIC SURGERY

## 2020-12-02 PROCEDURE — 3600000015 HC SURGERY LEVEL 5 ADDTL 15MIN: Performed by: ORTHOPAEDIC SURGERY

## 2020-12-02 PROCEDURE — 2500000003 HC RX 250 WO HCPCS: Performed by: ORTHOPAEDIC SURGERY

## 2020-12-02 PROCEDURE — 3700000000 HC ANESTHESIA ATTENDED CARE: Performed by: ORTHOPAEDIC SURGERY

## 2020-12-02 PROCEDURE — 73560 X-RAY EXAM OF KNEE 1 OR 2: CPT

## 2020-12-02 PROCEDURE — C1776 JOINT DEVICE (IMPLANTABLE): HCPCS | Performed by: ORTHOPAEDIC SURGERY

## 2020-12-02 PROCEDURE — 6360000002 HC RX W HCPCS: Performed by: ANESTHESIOLOGY

## 2020-12-02 PROCEDURE — 7100000000 HC PACU RECOVERY - FIRST 15 MIN: Performed by: ORTHOPAEDIC SURGERY

## 2020-12-02 PROCEDURE — 3700000001 HC ADD 15 MINUTES (ANESTHESIA): Performed by: ORTHOPAEDIC SURGERY

## 2020-12-02 PROCEDURE — C9290 INJ, BUPIVACAINE LIPOSOME: HCPCS | Performed by: ORTHOPAEDIC SURGERY

## 2020-12-02 PROCEDURE — 2580000003 HC RX 258: Performed by: ORTHOPAEDIC SURGERY

## 2020-12-02 PROCEDURE — 86900 BLOOD TYPING SEROLOGIC ABO: CPT

## 2020-12-02 PROCEDURE — 7100000001 HC PACU RECOVERY - ADDTL 15 MIN: Performed by: ORTHOPAEDIC SURGERY

## 2020-12-02 PROCEDURE — 86850 RBC ANTIBODY SCREEN: CPT

## 2020-12-02 PROCEDURE — 6370000000 HC RX 637 (ALT 250 FOR IP): Performed by: ORTHOPAEDIC SURGERY

## 2020-12-02 PROCEDURE — 88305 TISSUE EXAM BY PATHOLOGIST: CPT

## 2020-12-02 PROCEDURE — 2580000003 HC RX 258: Performed by: NURSE PRACTITIONER

## 2020-12-02 PROCEDURE — 7100000010 HC PHASE II RECOVERY - FIRST 15 MIN: Performed by: ORTHOPAEDIC SURGERY

## 2020-12-02 PROCEDURE — 6360000002 HC RX W HCPCS: Performed by: NURSE ANESTHETIST, CERTIFIED REGISTERED

## 2020-12-02 DEVICE — IMPLANTABLE DEVICE: Type: IMPLANTABLE DEVICE | Site: KNEE | Status: FUNCTIONAL

## 2020-12-02 DEVICE — INSERT TIB CS 2 10 MM ARTC POST KNEE BEAR TECHNOLOGY X3: Type: IMPLANTABLE DEVICE | Site: KNEE | Status: FUNCTIONAL

## 2020-12-02 DEVICE — COMPONENT PAT DIA31MM THK9MM KNEE TRITANIUM MTL BK: Type: IMPLANTABLE DEVICE | Site: KNEE | Status: FUNCTIONAL

## 2020-12-02 DEVICE — BASEPLATE TIB SZ 2 AP42MM ML64MM KNEE TRITANIUM 4 CRUCFRM: Type: IMPLANTABLE DEVICE | Site: KNEE | Status: FUNCTIONAL

## 2020-12-02 RX ORDER — VANCOMYCIN HYDROCHLORIDE 1 G/20ML
INJECTION, POWDER, LYOPHILIZED, FOR SOLUTION INTRAVENOUS
Status: COMPLETED | OUTPATIENT
Start: 2020-12-02 | End: 2020-12-02

## 2020-12-02 RX ORDER — PHENYLEPHRINE HCL IN 0.9% NACL 1 MG/10 ML
SYRINGE (ML) INTRAVENOUS PRN
Status: DISCONTINUED | OUTPATIENT
Start: 2020-12-02 | End: 2020-12-02 | Stop reason: SDUPTHER

## 2020-12-02 RX ORDER — SUCCINYLCHOLINE/SOD CL,ISO/PF 200MG/10ML
SYRINGE (ML) INTRAVENOUS PRN
Status: DISCONTINUED | OUTPATIENT
Start: 2020-12-02 | End: 2020-12-02 | Stop reason: SDUPTHER

## 2020-12-02 RX ORDER — FENTANYL CITRATE 50 UG/ML
25 INJECTION, SOLUTION INTRAMUSCULAR; INTRAVENOUS EVERY 5 MIN PRN
Status: DISCONTINUED | OUTPATIENT
Start: 2020-12-02 | End: 2020-12-02 | Stop reason: HOSPADM

## 2020-12-02 RX ORDER — SODIUM CHLORIDE 0.9 % (FLUSH) 0.9 %
10 SYRINGE (ML) INJECTION EVERY 12 HOURS SCHEDULED
Status: CANCELLED | OUTPATIENT
Start: 2020-12-02

## 2020-12-02 RX ORDER — ACETAMINOPHEN 500 MG
1000 TABLET ORAL ONCE
Status: COMPLETED | OUTPATIENT
Start: 2020-12-02 | End: 2020-12-02

## 2020-12-02 RX ORDER — OXYCODONE HYDROCHLORIDE 5 MG/1
5-10 TABLET ORAL EVERY 6 HOURS PRN
Qty: 40 TABLET | Refills: 0 | Status: SHIPPED | OUTPATIENT
Start: 2020-12-02 | End: 2020-12-17 | Stop reason: SDUPTHER

## 2020-12-02 RX ORDER — LABETALOL HYDROCHLORIDE 5 MG/ML
5 INJECTION, SOLUTION INTRAVENOUS EVERY 10 MIN PRN
Status: DISCONTINUED | OUTPATIENT
Start: 2020-12-02 | End: 2020-12-02 | Stop reason: HOSPADM

## 2020-12-02 RX ORDER — ONDANSETRON 2 MG/ML
INJECTION INTRAMUSCULAR; INTRAVENOUS PRN
Status: DISCONTINUED | OUTPATIENT
Start: 2020-12-02 | End: 2020-12-02 | Stop reason: SDUPTHER

## 2020-12-02 RX ORDER — OXYCODONE HYDROCHLORIDE 5 MG/1
5 TABLET ORAL EVERY 4 HOURS PRN
Status: CANCELLED | OUTPATIENT
Start: 2020-12-02

## 2020-12-02 RX ORDER — PROMETHAZINE HYDROCHLORIDE 25 MG/ML
6.25 INJECTION, SOLUTION INTRAMUSCULAR; INTRAVENOUS
Status: DISCONTINUED | OUTPATIENT
Start: 2020-12-02 | End: 2020-12-02 | Stop reason: HOSPADM

## 2020-12-02 RX ORDER — OXYCODONE HYDROCHLORIDE 5 MG/1
5 TABLET ORAL EVERY 4 HOURS PRN
Status: DISCONTINUED | OUTPATIENT
Start: 2020-12-02 | End: 2020-12-02 | Stop reason: CLARIF

## 2020-12-02 RX ORDER — OXYCODONE HYDROCHLORIDE 10 MG/1
10 TABLET ORAL EVERY 4 HOURS PRN
Status: CANCELLED | OUTPATIENT
Start: 2020-12-02

## 2020-12-02 RX ORDER — ONDANSETRON 2 MG/ML
4 INJECTION INTRAMUSCULAR; INTRAVENOUS EVERY 6 HOURS PRN
Status: CANCELLED | OUTPATIENT
Start: 2020-12-02

## 2020-12-02 RX ORDER — SODIUM CHLORIDE 450 MG/100ML
INJECTION, SOLUTION INTRAVENOUS CONTINUOUS
Status: CANCELLED | OUTPATIENT
Start: 2020-12-02

## 2020-12-02 RX ORDER — SODIUM CHLORIDE 9 MG/ML
INJECTION, SOLUTION INTRAVENOUS CONTINUOUS
Status: DISCONTINUED | OUTPATIENT
Start: 2020-12-02 | End: 2020-12-02 | Stop reason: HOSPADM

## 2020-12-02 RX ORDER — SODIUM CHLORIDE 0.9 % (FLUSH) 0.9 %
10 SYRINGE (ML) INJECTION EVERY 12 HOURS SCHEDULED
Status: DISCONTINUED | OUTPATIENT
Start: 2020-12-02 | End: 2020-12-02 | Stop reason: HOSPADM

## 2020-12-02 RX ORDER — OXYCODONE HYDROCHLORIDE 10 MG/1
10 TABLET ORAL EVERY 4 HOURS PRN
Status: DISCONTINUED | OUTPATIENT
Start: 2020-12-02 | End: 2020-12-02 | Stop reason: HOSPADM

## 2020-12-02 RX ORDER — SODIUM CHLORIDE 0.9 % (FLUSH) 0.9 %
10 SYRINGE (ML) INJECTION PRN
Status: DISCONTINUED | OUTPATIENT
Start: 2020-12-02 | End: 2020-12-02 | Stop reason: HOSPADM

## 2020-12-02 RX ORDER — SODIUM CHLORIDE, SODIUM LACTATE, POTASSIUM CHLORIDE, CALCIUM CHLORIDE 600; 310; 30; 20 MG/100ML; MG/100ML; MG/100ML; MG/100ML
INJECTION, SOLUTION INTRAVENOUS CONTINUOUS PRN
Status: DISCONTINUED | OUTPATIENT
Start: 2020-12-02 | End: 2020-12-02 | Stop reason: SDUPTHER

## 2020-12-02 RX ORDER — OXYCODONE HYDROCHLORIDE 10 MG/1
10 TABLET ORAL
Status: DISCONTINUED | OUTPATIENT
Start: 2020-12-02 | End: 2020-12-02 | Stop reason: HOSPADM

## 2020-12-02 RX ORDER — ROCURONIUM BROMIDE 10 MG/ML
INJECTION, SOLUTION INTRAVENOUS PRN
Status: DISCONTINUED | OUTPATIENT
Start: 2020-12-02 | End: 2020-12-02 | Stop reason: SDUPTHER

## 2020-12-02 RX ORDER — CELECOXIB 200 MG/1
200 CAPSULE ORAL ONCE
Status: COMPLETED | OUTPATIENT
Start: 2020-12-02 | End: 2020-12-02

## 2020-12-02 RX ORDER — TRANEXAMIC ACID 100 MG/ML
INJECTION, SOLUTION INTRAVENOUS
Status: COMPLETED | OUTPATIENT
Start: 2020-12-02 | End: 2020-12-02

## 2020-12-02 RX ORDER — EPHEDRINE SULFATE/0.9% NACL/PF 50 MG/5 ML
SYRINGE (ML) INTRAVENOUS PRN
Status: DISCONTINUED | OUTPATIENT
Start: 2020-12-02 | End: 2020-12-02 | Stop reason: SDUPTHER

## 2020-12-02 RX ORDER — GLYCOPYRROLATE 0.2 MG/ML
INJECTION INTRAMUSCULAR; INTRAVENOUS PRN
Status: DISCONTINUED | OUTPATIENT
Start: 2020-12-02 | End: 2020-12-02 | Stop reason: SDUPTHER

## 2020-12-02 RX ORDER — MIDAZOLAM HYDROCHLORIDE 1 MG/ML
INJECTION INTRAMUSCULAR; INTRAVENOUS PRN
Status: DISCONTINUED | OUTPATIENT
Start: 2020-12-02 | End: 2020-12-02 | Stop reason: SDUPTHER

## 2020-12-02 RX ORDER — FENTANYL CITRATE 50 UG/ML
INJECTION, SOLUTION INTRAMUSCULAR; INTRAVENOUS PRN
Status: DISCONTINUED | OUTPATIENT
Start: 2020-12-02 | End: 2020-12-02 | Stop reason: SDUPTHER

## 2020-12-02 RX ORDER — ASPIRIN 81 MG/1
81 TABLET ORAL 2 TIMES DAILY
Qty: 28 TABLET | Refills: 0 | Status: SHIPPED | OUTPATIENT
Start: 2020-12-02 | End: 2021-02-15

## 2020-12-02 RX ORDER — OXYCODONE HYDROCHLORIDE 10 MG/1
10 TABLET ORAL EVERY 4 HOURS PRN
Status: DISCONTINUED | OUTPATIENT
Start: 2020-12-02 | End: 2020-12-02 | Stop reason: CLARIF

## 2020-12-02 RX ORDER — LIDOCAINE HYDROCHLORIDE 20 MG/ML
INJECTION, SOLUTION EPIDURAL; INFILTRATION; INTRACAUDAL; PERINEURAL PRN
Status: DISCONTINUED | OUTPATIENT
Start: 2020-12-02 | End: 2020-12-02 | Stop reason: SDUPTHER

## 2020-12-02 RX ORDER — SODIUM CHLORIDE 0.9 % (FLUSH) 0.9 %
10 SYRINGE (ML) INJECTION PRN
Status: CANCELLED | OUTPATIENT
Start: 2020-12-02

## 2020-12-02 RX ORDER — CEPHALEXIN 500 MG/1
500 CAPSULE ORAL SEE ADMIN INSTRUCTIONS
Qty: 2 CAPSULE | Refills: 0 | Status: SHIPPED | OUTPATIENT
Start: 2020-12-02 | End: 2021-02-15

## 2020-12-02 RX ORDER — PROPOFOL 10 MG/ML
INJECTION, EMULSION INTRAVENOUS PRN
Status: DISCONTINUED | OUTPATIENT
Start: 2020-12-02 | End: 2020-12-02 | Stop reason: SDUPTHER

## 2020-12-02 RX ORDER — ACETAMINOPHEN 325 MG/1
650 TABLET ORAL EVERY 4 HOURS PRN
Status: CANCELLED | OUTPATIENT
Start: 2020-12-02

## 2020-12-02 RX ORDER — SENNA AND DOCUSATE SODIUM 50; 8.6 MG/1; MG/1
1 TABLET, FILM COATED ORAL 2 TIMES DAILY
Status: CANCELLED | OUTPATIENT
Start: 2020-12-02

## 2020-12-02 RX ORDER — OXYCODONE HYDROCHLORIDE 5 MG/1
5 TABLET ORAL EVERY 4 HOURS PRN
Status: DISCONTINUED | OUTPATIENT
Start: 2020-12-02 | End: 2020-12-02 | Stop reason: HOSPADM

## 2020-12-02 RX ADMIN — FENTANYL CITRATE 50 MCG: 50 INJECTION INTRAMUSCULAR; INTRAVENOUS at 07:32

## 2020-12-02 RX ADMIN — LIDOCAINE HYDROCHLORIDE 3 ML: 20 INJECTION, SOLUTION EPIDURAL; INFILTRATION; INTRACAUDAL; PERINEURAL at 07:32

## 2020-12-02 RX ADMIN — ACETAMINOPHEN 1000 MG: 500 TABLET ORAL at 14:04

## 2020-12-02 RX ADMIN — SODIUM CHLORIDE: 9 INJECTION, SOLUTION INTRAVENOUS at 07:00

## 2020-12-02 RX ADMIN — SUGAMMADEX 100 MG: 100 INJECTION, SOLUTION INTRAVENOUS at 08:34

## 2020-12-02 RX ADMIN — ROCURONIUM BROMIDE 35 MG: 10 INJECTION INTRAVENOUS at 07:46

## 2020-12-02 RX ADMIN — MIDAZOLAM 1 MG: 1 INJECTION INTRAMUSCULAR; INTRAVENOUS at 07:26

## 2020-12-02 RX ADMIN — CEFAZOLIN SODIUM 2 G: 10 INJECTION, POWDER, FOR SOLUTION INTRAVENOUS at 14:05

## 2020-12-02 RX ADMIN — HYDROMORPHONE HYDROCHLORIDE 0.5 MG: 1 INJECTION, SOLUTION INTRAMUSCULAR; INTRAVENOUS; SUBCUTANEOUS at 09:40

## 2020-12-02 RX ADMIN — Medication 10 MG: at 07:45

## 2020-12-02 RX ADMIN — OXYCODONE HYDROCHLORIDE 10 MG: 10 TABLET ORAL at 07:15

## 2020-12-02 RX ADMIN — FENTANYL CITRATE 50 MCG: 50 INJECTION INTRAMUSCULAR; INTRAVENOUS at 07:26

## 2020-12-02 RX ADMIN — Medication 200 MCG: at 07:45

## 2020-12-02 RX ADMIN — CELECOXIB 200 MG: 200 CAPSULE ORAL at 07:00

## 2020-12-02 RX ADMIN — CEFAZOLIN SODIUM 2 G: 10 INJECTION, POWDER, FOR SOLUTION INTRAVENOUS at 07:26

## 2020-12-02 RX ADMIN — ONDANSETRON 4 MG: 2 INJECTION INTRAMUSCULAR; INTRAVENOUS at 08:27

## 2020-12-02 RX ADMIN — PROPOFOL 150 MG: 10 INJECTION, EMULSION INTRAVENOUS at 07:32

## 2020-12-02 RX ADMIN — Medication 100 MG: at 07:32

## 2020-12-02 RX ADMIN — SODIUM CHLORIDE: 9 INJECTION, SOLUTION INTRAVENOUS at 07:26

## 2020-12-02 RX ADMIN — GLYCOPYRROLATE 0.2 MG: 0.2 INJECTION, SOLUTION INTRAMUSCULAR; INTRAVENOUS at 07:41

## 2020-12-02 RX ADMIN — MIDAZOLAM 1 MG: 1 INJECTION INTRAMUSCULAR; INTRAVENOUS at 07:32

## 2020-12-02 RX ADMIN — GLYCOPYRROLATE 0.1 MG: 0.2 INJECTION, SOLUTION INTRAMUSCULAR; INTRAVENOUS at 07:26

## 2020-12-02 RX ADMIN — SUGAMMADEX 100 MG: 100 INJECTION, SOLUTION INTRAVENOUS at 08:38

## 2020-12-02 RX ADMIN — ROCURONIUM BROMIDE 5 MG: 10 INJECTION INTRAVENOUS at 07:32

## 2020-12-02 RX ADMIN — TRANEXAMIC ACID 1000 MG: 100 INJECTION, SOLUTION INTRAVENOUS at 14:52

## 2020-12-02 RX ADMIN — HYDROMORPHONE HYDROCHLORIDE 0.5 MG: 1 INJECTION, SOLUTION INTRAMUSCULAR; INTRAVENOUS; SUBCUTANEOUS at 09:23

## 2020-12-02 RX ADMIN — Medication 10 MG: at 07:42

## 2020-12-02 RX ADMIN — Medication 200 MCG: at 07:37

## 2020-12-02 RX ADMIN — SODIUM CHLORIDE, SODIUM LACTATE, POTASSIUM CHLORIDE, AND CALCIUM CHLORIDE: .6; .31; .03; .02 INJECTION, SOLUTION INTRAVENOUS at 08:27

## 2020-12-02 RX ADMIN — OXYCODONE HYDROCHLORIDE 5 MG: 5 TABLET ORAL at 14:50

## 2020-12-02 ASSESSMENT — PAIN DESCRIPTION - PAIN TYPE
TYPE: SURGICAL PAIN

## 2020-12-02 ASSESSMENT — PULMONARY FUNCTION TESTS
PIF_VALUE: 20
PIF_VALUE: 0
PIF_VALUE: 21
PIF_VALUE: 22
PIF_VALUE: 15
PIF_VALUE: 23
PIF_VALUE: 23
PIF_VALUE: 22
PIF_VALUE: 21
PIF_VALUE: 2
PIF_VALUE: 22
PIF_VALUE: 21
PIF_VALUE: 22
PIF_VALUE: 21
PIF_VALUE: 23
PIF_VALUE: 21
PIF_VALUE: 18
PIF_VALUE: 25
PIF_VALUE: 21
PIF_VALUE: 16
PIF_VALUE: 23
PIF_VALUE: 23
PIF_VALUE: 15
PIF_VALUE: 23
PIF_VALUE: 17
PIF_VALUE: 21
PIF_VALUE: 15
PIF_VALUE: 22
PIF_VALUE: 21
PIF_VALUE: 16
PIF_VALUE: 23
PIF_VALUE: 16
PIF_VALUE: 24
PIF_VALUE: 1
PIF_VALUE: 21
PIF_VALUE: 16
PIF_VALUE: 22
PIF_VALUE: 17
PIF_VALUE: 23
PIF_VALUE: 9
PIF_VALUE: 23
PIF_VALUE: 23
PIF_VALUE: 11
PIF_VALUE: 22
PIF_VALUE: 21
PIF_VALUE: 23
PIF_VALUE: 1
PIF_VALUE: 21
PIF_VALUE: 16
PIF_VALUE: 21
PIF_VALUE: 20
PIF_VALUE: 17
PIF_VALUE: 21
PIF_VALUE: 0
PIF_VALUE: 21
PIF_VALUE: 10
PIF_VALUE: 20
PIF_VALUE: 22
PIF_VALUE: 20
PIF_VALUE: 3
PIF_VALUE: 4
PIF_VALUE: 23
PIF_VALUE: 22
PIF_VALUE: 22
PIF_VALUE: 16
PIF_VALUE: 21
PIF_VALUE: 21
PIF_VALUE: 22
PIF_VALUE: 17
PIF_VALUE: 20
PIF_VALUE: 21
PIF_VALUE: 16
PIF_VALUE: 22
PIF_VALUE: 21
PIF_VALUE: 18
PIF_VALUE: 22
PIF_VALUE: 22
PIF_VALUE: 21
PIF_VALUE: 17
PIF_VALUE: 16
PIF_VALUE: 17
PIF_VALUE: 1

## 2020-12-02 ASSESSMENT — PAIN SCALES - GENERAL
PAINLEVEL_OUTOF10: 4
PAINLEVEL_OUTOF10: 0
PAINLEVEL_OUTOF10: 8
PAINLEVEL_OUTOF10: 2
PAINLEVEL_OUTOF10: 4
PAINLEVEL_OUTOF10: 8
PAINLEVEL_OUTOF10: 5
PAINLEVEL_OUTOF10: 5

## 2020-12-02 ASSESSMENT — PAIN - FUNCTIONAL ASSESSMENT
PAIN_FUNCTIONAL_ASSESSMENT: ACTIVITIES ARE NOT PREVENTED
PAIN_FUNCTIONAL_ASSESSMENT: ACTIVITIES ARE NOT PREVENTED
PAIN_FUNCTIONAL_ASSESSMENT: PREVENTS OR INTERFERES SOME ACTIVE ACTIVITIES AND ADLS
PAIN_FUNCTIONAL_ASSESSMENT: ACTIVITIES ARE NOT PREVENTED
PAIN_FUNCTIONAL_ASSESSMENT: PREVENTS OR INTERFERES SOME ACTIVE ACTIVITIES AND ADLS
PAIN_FUNCTIONAL_ASSESSMENT: ACTIVITIES ARE NOT PREVENTED
PAIN_FUNCTIONAL_ASSESSMENT: 0-10
PAIN_FUNCTIONAL_ASSESSMENT: PREVENTS OR INTERFERES SOME ACTIVE ACTIVITIES AND ADLS

## 2020-12-02 ASSESSMENT — PAIN DESCRIPTION - LOCATION
LOCATION: KNEE

## 2020-12-02 ASSESSMENT — PAIN DESCRIPTION - FREQUENCY
FREQUENCY: CONTINUOUS

## 2020-12-02 ASSESSMENT — PAIN DESCRIPTION - DESCRIPTORS
DESCRIPTORS: ACHING;DULL
DESCRIPTORS: DISCOMFORT
DESCRIPTORS: DISCOMFORT
DESCRIPTORS: SHARP
DESCRIPTORS: DISCOMFORT
DESCRIPTORS: DISCOMFORT
DESCRIPTORS: SHARP

## 2020-12-02 ASSESSMENT — PAIN DESCRIPTION - ONSET
ONSET: ON-GOING
ONSET: GRADUAL
ONSET: ON-GOING

## 2020-12-02 ASSESSMENT — PAIN DESCRIPTION - ORIENTATION
ORIENTATION: RIGHT

## 2020-12-02 ASSESSMENT — PAIN DESCRIPTION - PROGRESSION
CLINICAL_PROGRESSION: GRADUALLY IMPROVING
CLINICAL_PROGRESSION: NOT CHANGED
CLINICAL_PROGRESSION: GRADUALLY IMPROVING
CLINICAL_PROGRESSION: NOT CHANGED
CLINICAL_PROGRESSION: GRADUALLY WORSENING

## 2020-12-02 NOTE — PROGRESS NOTES
Patient C/O surgical pain at 8 of 10 and medicated per order. See MAR. VSS. IV patent. Patient denies C/O nausea.

## 2020-12-02 NOTE — PROGRESS NOTES
Pam with [facility] notified of same day discharge planned, to setup home therapy.  Electronically signed by Eleni Cho RN on 12/2/2020 at 8:44 AM

## 2020-12-02 NOTE — PROGRESS NOTES
Sleeping. Appears comfortable. Vss. Toes are warm, and tiffanie well. Dressing is clean dry intact. Mansi NP saw patient and wrote orders.

## 2020-12-02 NOTE — ANESTHESIA POSTPROCEDURE EVALUATION
[facility] Department of Anesthesiology  Post-Anesthesia Note       Name:  Faith Klein                                  Age:  63 y.o.   MRN:  [id number]     Last Vitals & Oxygen Saturation: BP (!) 152/59   Pulse 64   Temp 97 °F (36.1 °C) (Bladder)   Resp 16   Ht 5' 2\" (1.575 m)   Wt 137 lb 12.6 oz (62.5 kg)   LMP  (LMP Unknown)   SpO2 96%   BMI 25.20 kg/m²   Patient Vitals for the past 4 hrs:   BP Temp Temp src Pulse Resp SpO2   12/02/20 1015 (!) 152/59 97 °F (36.1 °C) Bladder 64 16 96 %   12/02/20 1000 -- -- -- 60 (!) 6 95 %   12/02/20 0959 -- -- -- 61 8 96 %   12/02/20 0958 -- 97.3 °F (36.3 °C) Temporal 65 9 96 %   12/02/20 0957 -- -- -- 61 15 --   12/02/20 0956 -- -- -- 67 22 100 %   12/02/20 0955 -- -- -- 66 21 100 %   12/02/20 0954 -- -- -- 61 13 100 %   12/02/20 0953 -- -- -- 74 17 100 %   12/02/20 0952 -- -- -- 68 21 99 %   12/02/20 0951 -- -- -- 69 10 100 %   12/02/20 0950 -- -- -- 68 (!) 55 100 %   12/02/20 0949 -- -- -- 69 20 100 %   12/02/20 0948 -- -- -- 67 11 100 %   12/02/20 0947 (!) 146/92 -- -- 66 16 100 %   12/02/20 0946 -- -- -- 65 10 100 %   12/02/20 0945 -- -- -- 68 15 100 %   12/02/20 0944 -- -- -- 63 11 100 %   12/02/20 0943 -- -- -- 67 11 100 %   12/02/20 0942 -- -- -- 62 (!) 7 100 %   12/02/20 0941 -- -- -- 64 8 100 %   12/02/20 0940 -- -- -- 64 8 100 %   12/02/20 0939 -- -- -- 64 (!) 7 100 %   12/02/20 0938 -- -- -- 60 (!) 7 100 %   12/02/20 0937 -- -- -- 62 (!) 7 100 %   12/02/20 0936 -- -- -- 65 (!) 7 100 %   12/02/20 0935 -- -- -- 63 (!) 7 100 %   12/02/20 0934 -- -- -- 63 9 100 %   12/02/20 0933 -- -- -- 64 8 100 %   12/02/20 0932 (!) 141/80 -- -- 63 (!) 6 100 %   12/02/20 0931 -- -- -- 63 11 100 %   12/02/20 0930 -- -- -- 65 10 100 %   12/02/20 0929 -- -- -- 65 12 100 %   12/02/20 0927 -- -- -- 65 9 99 %   12/02/20 0926 -- -- -- 61 9 100 %   12/02/20 0925 -- -- -- 64 (!) 7 100 %   12/02/20 0924 -- -- -- 64 9 100 %   12/02/20 0923 -- -- -- 63 8 100 %   12/02/20 0922 -- -- -- 65 9 100 %   12/02/20 0921 -- -- -- 64 8 100 %   12/02/20 0920 -- -- -- 64 10 100 %   12/02/20 0919 -- -- -- 62 9 100 %   12/02/20 0918 -- -- -- 65 9 100 %   12/02/20 0917 -- -- -- 63 8 100 %   12/02/20 0916 138/77 -- -- 63 10 100 %   12/02/20 0915 -- -- -- 63 8 100 %   12/02/20 0914 -- -- -- 63 9 100 %   12/02/20 0913 -- -- -- 63 8 100 %   12/02/20 0912 -- -- -- 62 10 100 %   12/02/20 0911 138/74 -- -- 62 8 100 %   12/02/20 0910 -- -- -- 64 13 99 %   12/02/20 0909 -- -- -- 66 9 100 %   12/02/20 0908 -- -- -- 67 13 100 %   12/02/20 0907 -- -- -- 64 10 100 %   12/02/20 0906 137/75 -- -- 65 8 100 %   12/02/20 0905 -- -- -- 64 9 100 %   12/02/20 0904 -- -- -- 66 11 100 %   12/02/20 0903 -- -- -- 66 11 100 %   12/02/20 0902 -- -- -- 67 10 100 %   12/02/20 0901 132/75 -- -- 66 9 100 %   12/02/20 0900 -- -- -- 68 13 100 %   12/02/20 0859 -- -- -- 68 9 100 %   12/02/20 0858 -- -- -- 70 10 99 %   12/02/20 0857 116/72 -- -- 70 10 99 %   12/02/20 0856 -- -- -- 72 14 99 %   12/02/20 0855 121/64 -- -- 70 (!) 7 99 %   12/02/20 0854 -- -- -- 85 -- 98 %   12/02/20 0853 121/64 97.1 °F (36.2 °C) Temporal 70 (!) 7 98 %       Level of consciousness:  Awake, alert    Respiratory: Respirations easy, no distress. Stable. Cardiovascular: Hemodynamically stable. Hydration: Adequate. PONV: Adequately managed. Post-op pain: Adequately controlled. Post-op assessment: Tolerated anesthetic well without complication. Complications:  None.     Tyrone Contreras MD  December 2, 2020   11:04 AM

## 2020-12-02 NOTE — PROGRESS NOTES
Patient admitted to PACU from OR. Patient opens eyes to name, drowsy. Resp easy unlabored on 3LNC with SaO2 98%. Monitor in SR. Right knee ace wrap dressing dry and intact with stable neurovascular checks bilat. VSS. IV patent. Moving all extremities to command. Radiology called to do right knee x-rays.

## 2020-12-02 NOTE — PROGRESS NOTES
[facility] Orthopedic Surgery   Progress Note      S/P :  SUBJECTIVE  In SDS area post right TKA, Awake and oriented.  at bedside. . Pain is   described in right knee and with the intensity of mild. Pain is described as aching. OBJECTIVE              Physical                      VITALS:  BP (!) 152/59   Pulse 64   Temp 97 °F (36.1 °C) (Bladder)   Resp 16   Ht 5' 2\" (1.575 m)   Wt 137 lb 12.6 oz (62.5 kg)   LMP  (LMP Unknown)   SpO2 96%   BMI 25.20 kg/m²                     MUSCULOSKELETAL:  right foot NVI. Wiggles toes to command. Pedal pulses are palpable. NEUROLOGIC:                                  Sensory:  Touch:  Right Lower Extremity:  normal                                                 Surgical wound appears clean and dry right knee with ACe and ice pad on.      Data       CBC:   Lab Results   Component Value Date    WBC 9.1 11/16/2020    RBC 4.35 11/16/2020    HGB 13.1 11/16/2020    HCT 39.5 11/16/2020    MCV 90.7 11/16/2020    MCH 30.1 11/16/2020    MCHC 33.2 11/16/2020    RDW 13.7 11/16/2020     11/16/2020    MPV 9.9 11/16/2020        WBC:    Lab Results   Component Value Date    WBC 9.1 11/16/2020        Hemoglobin/Hematocrit:    Lab Results   Component Value Date    HGB 13.1 11/16/2020    HCT 39.5 11/16/2020        PT/INR:    Lab Results   Component Value Date    PROTIME 11.4 11/16/2020    INR 0.98 11/16/2020              Current Inpatient Medications             Current Facility-Administered Medications: 0.9 % sodium chloride infusion, , Intravenous, Continuous  sodium chloride flush 0.9 % injection 10 mL, 10 mL, Intravenous, 2 times per day  sodium chloride flush 0.9 % injection 10 mL, 10 mL, Intravenous, PRN  fentaNYL (SUBLIMAZE) injection 25 mcg, 25 mcg, Intravenous, Q5 Min PRN  HYDROmorphone (DILAUDID) injection 0.5 mg, 0.5 mg, Intravenous, Q5 Min PRN  promethazine (PHENERGAN) injection 6.25 mg, 6.25 mg, Intravenous, Once PRN  labetalol

## 2020-12-02 NOTE — H&P
Preoperative H&P Update     The patient's History and Physical in the medical record dated 12/2/20 was reviewed by me today. I reviewed the HPI, medications, allergies, reason for surgery, diagnosis and treatment plan and there has been no change. The patient was evaluated by me today. Prior to Visit Medications    Medication Sig Taking? Authorizing Provider   metoprolol tartrate (LOPRESSOR) 25 MG tablet Take 1 tablet by mouth 2 times daily Yes Divya Beavers MD   losartan-hydroCHLOROthiazide (HYZAAR) 100-12.5 MG per tablet TAKE 1 TABLET BY MOUTH ONCE DAILY Yes Divya Beavers MD   doxepin (SILENOR) 3 MG TABS tablet Take 1 tablet by mouth nightly  Patient taking differently: Take 3 mg by mouth nightly as needed  Yes Divya Beavers MD   atorvastatin (LIPITOR) 80 MG tablet TAKE 1 TABLET BY MOUTH ONCE DAILY Yes Divya Beavers MD   Armodafinil (NUVIGIL) 150 MG TABS tablet one tab QAM prn Yes Stacy Shabazz MD   amitriptyline (ELAVIL) 25 MG tablet Take 1 tablet by mouth nightly Yes Divya Beavers MD   cyclobenzaprine (FLEXERIL) 5 MG tablet Take 1 tablet by mouth daily  Patient taking differently: Take 5 mg by mouth every evening  Yes Divya Beavers MD   fluticasone (FLONASE) 50 MCG/ACT nasal spray 1 spray by Nasal route daily as needed  Yes Historical Provider, MD       Physical exam findings for this update include:  Vitals:    12/02/20 0645   BP: 136/74   Pulse: 56   Resp: 16   Temp: 97.8 °F (36.6 °C)   SpO2: 97%     Airway is intact Chest: breathing comfortably  Heart: regular rate and rhythm. Examination of the body region where surgery is to be performed shows skin is intact at the operative site. The risk, benefits, and alternatives of the proposed procedure have been explained to the patient (or appropriate guardian) and understanding verbalized. All questions answered. Patient wishes to proceed.     The patient was counseled at length about the risks of helena Covid-19 during their perioperative period and any recovery window from their procedure. The patient was made aware that helena Covid-19  may worsen their prognosis for recovering from their procedure  and lend to a higher morbidity and/or mortality risk. All material risks, benefits, and reasonable alternatives including postponing the procedure were discussed. The patient does wish to proceed with the procedure at this time.           Natalia Montgomery MD    Electronically signed by Anna Marie Be MD on 12/2/2020 at 7:24 AM

## 2020-12-02 NOTE — PROGRESS NOTES
Patient denies any needs at this time. RLE Ace wrap dry and intact to site. Call light within reach and use reinforced.  at bedside. Will continue to monitor.

## 2020-12-02 NOTE — CARE COORDINATION
[facility]  Received referral from [address]  Faxed orders to [address] care to see patient by   12/4/20205  [facility] LPN    [facility] CTN Cell [phone], Fax [phone]

## 2020-12-02 NOTE — OP NOTE
[address] Eloise Romo [address]                                OPERATIVE REPORT    PATIENT NAME: Frederick Oviedo                      :        1960  MED REC NO:   [id number]                          ROOM:  ACCOUNT NO:   [de-identified]                           ADMIT DATE: 2020  PROVIDER:     Sia Zaragoza MD    DATE OF PROCEDURE:  2020    PREOPERATIVE DIAGNOSIS:  Tricompartmental degenerative osteoarthritis of  the right knee. POSTOPERATIVE DIAGNOSIS:  Tricompartmental degenerative osteoarthritis  of the right knee. OPERATION PERFORMED:  Right robotic-assisted total knee arthroplasty. SURGEON:  Sia Zaragoza MD    ASSISTANTS:  EZE Tam; also present in the room, Dr. Tracy Hollis. ESTIMATED BLOOD LOSS:  Minimal at the time of surgery. INDICATIONS:  The patient is a 76-year-old female, who presented with  bilateral knee arthritis. She is now about four to five months status  post contralateral left total knee arthroplasty. She now comes for  right knee replacement. OPERATIVE PROCEDURE:  The patient was brought to the operating room. Once anesthetic was obtained and intravenous antibiotics delivered, her  right leg and foot were prepped and draped in a sterile fashion. The  leg was exsanguinated. Tourniquet was placed to 300 mmHg around the  thigh. An anterior incision was made. Skin and subcutaneous tissue were  divided down to the extensor mechanism. Medial parapatellar arthrotomy  was performed. The knee cap was everted, measured, and cut to accept an  uncemented 31-mm patella. Once this was done, the knee was fully  exposed. Two distal femoral pins and two proximal tibial pins were  placed for the robotic aerials, and then the tracker points for both  distal femoral and proximal tibia were also placed.     With a third robotic aerial, the knee was registered with the robot. It  was virtually manipulated both with robotic manipulation and taking the  knee through various stances and poses and stresses to balance both  flexion and extension gaps. Once this had been performed, then the  robotic cutting arm was brought in and then in the order of posterior  femoral condyles, anterior femoral condyle, anterior femoral chamfer  cuts, tibial cuts, posterior femoral chamfer cuts, and then distal  femoral condylar cuts were made. All the bony fragments were removed, and the knee was reconstructed to  accept a #2 tibia, a #2 femoral cruciate-retaining component, a 31-mm  patella, and a 10-mm tibial tray insert. With this, the knee came to  full extension, excellent flexion, and good overall stability. The  patella tracked well. Lateral release was not performed. All trial implants were removed. The ends of the bones were irrigated  off. Then, in the order of tibia, femur and patella, the #2 tibial tray  was hammered into place in uncemented fashion, the #2 [employer]  uncemented femoral component cruciate-retaining was hammered into place,  and the 31-mm uncemented patella was squeezed into the back of the  patella. The 10-mm poly was snapped into place. Exam now showed full  extension, excellent flexion, and good overall stability, and the  patella tracked well. The wound was irrigated out. Hemostasis was obtained. The wound was  injected with 120 mL of Exparel and bupivacaine, as this was an  outpatient procedure. It was bathed for five minutes with 3 gm of  tranexamic acid and then finally aqueous iodine and 1 gm of vancomycin  powder was placed in the wound again secondary to her outpatient status. The wound was closed in layers. The patient tolerated the procedure  well and was transferred to the recovery room in good condition. TRISTON Mora MD    D: 12/02/2020 8:41:22       T: 12/02/2020 12:42:34     [id number]  Job#: [id number]     Doc#: [id number]    CC:

## 2020-12-02 NOTE — ANESTHESIA PRE PROCEDURE
[facility] Department of Anesthesiology  Pre-Anesthesia Evaluation/Consultation       Name:  Marie Kern  : 1960  Age:  63 y.o.                                            MRN:  [id number]  Date: 2020           Surgeon: Surgeon(s):  Nneka Harris MD    Procedure: Procedure(s):  RIGHT TOTAL KNEE REPLACEMENT ROBOTIC ASSISTED     Allergies   Allergen Reactions    Hydrocodone-Acetaminophen Shortness Of Breath     Other reaction(s): Respiratory problems, e.g., wheezing    Vicodin [Hydrocodone-Acetaminophen] Shortness Of Breath    Lisinopril      cough    Sansert [Methysergide]      Extreme weakness    Toradol [Ketorolac Tromethamine] Nausea Only     Patient Active Problem List   Diagnosis    [facility] DJD(carpometacarpal degenerative joint disease), localized primary    Fibromyalgia    Essential hypertension    Mixed hyperlipidemia    Dysthymia    Metatarsalgia of right foot    History of total knee arthroplasty, left    Left shoulder pain    Rotator cuff tendonitis, left    DAVID (obstructive sleep apnea)     Past Medical History:   Diagnosis Date    HBP (high blood pressure)     Hyperlipidemia     Obstructive sleep apnea on CPAP     Plantar fasciitis     Postoperative nausea     per patient lasted 3 weeks after knee replacement surgery    Seasonal allergies      Past Surgical History:   Procedure Laterality Date    CARPAL TUNNEL RELEASE Right      SECTION  2619,6040, 1986, 1994    COLONOSCOPY  2018    Dr. Clifton Nieto with polyp    FOOT NEUROMA SURGERY Right     HAND SURGERY Left     Thumb joint    HYSTERECTOMY      LASIK      SHOULDER SURGERY      left    SPINAL FUSION      L4    TOTAL KNEE ARTHROPLASTY Left 2020    ROBOTIC ASSISTED LEFT TOTAL KNEE REPLACEMENT performed by [facility]neka Harris MD at [facility] History     Tobacco Use    Smoking status: Never Smoker    Smokeless tobacco: Never Used   Substance Use Topics    Alcohol use: Yes     Comment: monthly, occasional drink    Drug use: No     Medications  No current facility-administered medications on file prior to encounter. Current Outpatient Medications on File Prior to Encounter   Medication Sig Dispense Refill    Armodafinil (NUVIGIL) 150 MG TABS tablet one tab QAM prn 30 tablet 5    amitriptyline (ELAVIL) 25 MG tablet Take 1 tablet by mouth nightly 90 tablet 3    cyclobenzaprine (FLEXERIL) 5 MG tablet Take 1 tablet by mouth daily (Patient taking differently: Take 5 mg by mouth every evening ) 90 tablet 3    fluticasone (FLONASE) 50 MCG/ACT nasal spray 1 spray by Nasal route daily as needed        Current Facility-Administered Medications   Medication Dose Route Frequency Provider Last Rate Last Dose    0.9 % sodium chloride infusion   Intravenous Continuous Kimberly Kerr MD        sodium chloride flush 0.9 % injection 10 mL  10 mL Intravenous 2 times per day Kimberly Kerr MD        sodium chloride flush 0.9 % injection 10 mL  10 mL Intravenous PRN Kimberly Kerr MD        ceFAZolin (ANCEF) 2 g in dextrose 5 % 100 mL IVPB  2 g Intravenous Once Anay Tomlinson MD        celecoxib (CELEBREX) capsule 200 mg  200 mg Oral Once Anay Tomlinson MD         Vital Signs (Current) There were no vitals filed for this visit. Vital Signs Statistics (for past 48 hrs)     No data recorded    BP Readings from Last 3 Encounters:   11/25/20 132/60   11/23/20 (!) 160/98   06/11/20 (!) 140/88     BMI  Body mass index is 25.2 kg/m². Estimated body mass index is 25.2 kg/m² as calculated from the following:    Height as of this encounter: 5' 2\" (1.575 m). Weight as of this encounter: 137 lb 12.6 oz (62.5 kg).     CBC   Lab Results   Component Value Date    WBC 9.1 11/16/2020    RBC 4.35 11/16/2020    HGB 13.1 11/16/2020    HCT 39.5 11/16/2020    MCV 90.7 11/16/2020    RDW 13.7 11/16/2020     11/16/2020     CMP    Lab Results   Component Value Date     11/16/2020

## 2020-12-02 NOTE — PROGRESS NOTES
Data- discharge order received, patient verbalized agreement to discharge, disposition to previous residence, needs noted for Tao Norris and informed Wen Escobar NP. Action- discharge instructions prepared and given to patient and  lewis, patient and lewis verbalized understanding. Medication information packet given r/t NEW and/or CHANGED prescriptions emphasizing name/purpose/side effects, pt verbalized understanding. Discharge instruction summary: Diet- general, Activity- wbat, Primary Care Physician as follows: Kem Centeno [phone]. f/u appointment with orthopedic office noted below, immunizations reviewed and discussed with patient, prescription medications to be filled by retail pharmacy and then delivered. Inpatient surgical procedure precautions reviewed: . Neurovascular check performed and patient is WNLs, denies numbness/tingling in extremties. Incision site covered by ace dressing assessed and is  clean,dry, and intact, no signs of redness, drainage, or odor noted. patient's bedside RN cinthia notified of patient completing discharge instructions. incentive spirometer provided to patient and educated on purpose and use, patient demonstrated understanding. Per Gregorio Douglas NP patient ambulated in hallway 20 feet with walker without difficulty. Response-  Medications delivered to patient via meds to bed program. Disposition is home with [facility], to be transported by lewis, no complications.      Future Appointments   Date Time Provider Breanne Calderon   12/17/2020 10:00 AM MD Vinay Ferrara   1/25/2021  8:45 AM Radha Kate MD [facility]     Electronically signed by Eustaquio Phoenix, RN on 12/2/2020 at 4:06 PM

## 2020-12-02 NOTE — PROGRESS NOTES
Patient assisted to bedpan to void 450ml clear yellow urine. Patient tolerated well. Repositioned for comfort. Pain level 5 of 10 and tolerable per patient. VSS. IV patent. Patient dozing off and on between nursing care. SaO2 93-95% on room air O2.

## 2020-12-02 NOTE — PROGRESS NOTES
CLINICAL PHARMACY NOTE: [facility] TO [facility] Select Patient?: No  Total # of Prescriptions Filled: 3   The following medications were delivered to the patient:  Discharge Medication List as of 12/2/2020  3:38 PM      START taking these medications    Details   oxyCODONE (ROXICODONE) 5 MG immediate release tablet Take 1-2 tablets by mouth every 6 hours as needed for Pain for up to 5 days. , Disp-40 tablet,R-0Print      cephALEXin (KEFLEX) 500 MG capsule Take 1 capsule by mouth See Admin Instructions Take one capsule at 9pm today and one capsule at 9am tomorrow, Disp-2 capsule,R-0Print      aspirin EC 81 MG EC tablet Take 1 tablet by mouth 2 times daily for 14 days Please avoid missing doses. , Disp-28 tablet,R-0Print         ·   ·   Total # of Interventions Completed: 1  Time Spent (min): 30    Additional Documentation:

## 2020-12-02 NOTE — PROGRESS NOTES
Alert and oriented. Vss. Dressing remains clean dry intact. Toes are warm, move well, tiffanie well.  at bedside. Verbalized understanding of discharge instructions. Tolerated sitting up and po fluids and snack well. Chela Gonzales and [facility] NP both said that patient was ready for discharge. Patient says she is ready to go home. Ambulate to wheelchair with assistance.

## 2020-12-02 NOTE — PROGRESS NOTES
From PACU. sleepy but oriented. C/o 5/10 surgical right knee pain that is tolerable. Dressing is clean dry intact. Toes are warm, move well, tiffanie well. Ice machine to right knee area. Refusing analgesic at this time.

## 2020-12-02 NOTE — PROGRESS NOTES
Awake now. Tolerating po fluids. Vss. Dressing remains clean dry intact. Toes are warm, move well, tiffanie well. When asked she said her surgical pain is 4/10 and she wants to try the tylenol first, refusing narcotic at this time.

## 2020-12-08 RX ORDER — CYCLOBENZAPRINE HCL 5 MG
5 TABLET ORAL EVERY EVENING
Qty: 90 TABLET | Refills: 3 | Status: SHIPPED | OUTPATIENT
Start: 2020-12-08 | End: 2021-03-08

## 2020-12-08 RX ORDER — AMITRIPTYLINE HYDROCHLORIDE 25 MG/1
TABLET, FILM COATED ORAL
Qty: 90 TABLET | Refills: 3 | Status: SHIPPED | OUTPATIENT
Start: 2020-12-08 | End: 2021-12-23

## 2020-12-09 ENCOUNTER — TELEPHONE (OUTPATIENT)
Dept: ORTHOPEDICS UNIT | Age: 60
End: 2020-12-09

## 2020-12-09 NOTE — TELEPHONE ENCOUNTER
Attempted to contact patient. Left hippa compliant voicemail for patient stating purpose and call back number.    Beronicarenee Noemy  Orthopedic Nurse Navigator  Phone number: [phone]  Future Appointments   Date Time Provider Breanne Yumiko   12/17/2020 10:00 AM MD Vinay Ferrara   1/25/2021  8:45 AM Radha Kate MD [facility]       Electronically signed by Eustaquio Phoenix, RN on 12/9/2020 at 1:38 PM

## 2020-12-11 NOTE — TELEPHONE ENCOUNTER
Received voicemail from, patient returning phone call. Attempted to contact patient. Left hippa compliant voicemail for patient stating purpose and call back number.    Faina Lowry  Orthopedic Nurse Navigator  Phone number: [phone]  Future Appointments   Date Time Provider Breanne Calderon   12/17/2020 10:00 AM Darlin Hines MD [facility]   12/21/2020  2:45 PM Iva Severance, PT [facility] OP PT [facility]   1/25/2021  8:45 AM Claudine Nino MD [facility]       Electronically signed by Jenny Olivia RN on 12/11/2020 at 11:54 AM

## 2020-12-14 ENCOUNTER — TELEPHONE (OUTPATIENT)
Dept: ORTHOPEDICS UNIT | Age: 60
End: 2020-12-14

## 2020-12-14 NOTE — TELEPHONE ENCOUNTER
Received return call from patient voicemail. Attempted to contact patient. Left hippa compliant voicemail for patient stating purpose and call back number.    Susan Moran  Orthopedic Nurse Navigator  Phone number: [phone]  Future Appointments   Date Time Provider Breanne Calderon   12/17/2020 10:00 AM Pretty Dunn MD [facility]   12/21/2020  2:45 PM [facility] [place] [facility] PT [facility]   1/25/2021  8:45 AM Esha Fraga MD [facility]       Electronically signed by Raquel Duval RN on 12/14/2020 at 4:36 PM

## 2020-12-16 NOTE — TELEPHONE ENCOUNTER
Prescription Refill     Medication Name:  OXYCODONE  Pharmacy: [phone], [facility] [id number].   Patient Contact Number:  [phone]

## 2020-12-17 ENCOUNTER — OFFICE VISIT (OUTPATIENT)
Dept: ORTHOPEDIC SURGERY | Age: 60
End: 2020-12-17

## 2020-12-17 VITALS — BODY MASS INDEX: 25.21 KG/M2 | HEIGHT: 62 IN | WEIGHT: 137 LBS | TEMPERATURE: 97.3 F

## 2020-12-17 PROCEDURE — 99024 POSTOP FOLLOW-UP VISIT: CPT | Performed by: PHYSICIAN ASSISTANT

## 2020-12-17 RX ORDER — OXYCODONE HYDROCHLORIDE 5 MG/1
5-10 TABLET ORAL EVERY 6 HOURS PRN
Qty: 40 TABLET | Refills: 0 | Status: SHIPPED | OUTPATIENT
Start: 2020-12-17 | End: 2020-12-22

## 2020-12-19 NOTE — PROGRESS NOTES
This dictation was done with Versly dictation and may contain mechanical errors related to translation. Temperature 97.3 °F (36.3 °C), temperature source Infrared, height 5' 2\" (1.575 m), weight 137 lb (62.1 kg), not currently breastfeeding. This is a pleasant 29-year-old female who is here after her right total knee replacement on 12/2/2020. All in all she is doing extremely well she has only has a 4 out of 10 pain she is having some tightness in her calf not appear to be consistent with a DVT is more muscle spasm and fluid extravasation postoperatively. She is neurologically intact to her right knee. She states that her walker and crutches has led to some soreness in her left shoulder. Xray three views of the total knee arthroplasty reveals satisfactory alignment of the prosthesis . No signs of significant polyethylene wear or failure. No progressive radiolucencies,fractures, tumors or dislocations. These x-rays including AP lateral and sunrise view of her right knee. On examination this is a pleasant 63year-old acute distress she is alert and oriented x3 she can walk without antalgia using assistance has good 0-90 use of motion already is neurologically intact to her right foot with good dorsiflexion and plantarflexion strength.   She has some soreness with crossover and the left shoulder for her left shoulder replacement therapy    Impression is stable healing right total knee replacement with left shoulder strain    4 weeks

## 2020-12-21 ENCOUNTER — HOSPITAL ENCOUNTER (OUTPATIENT)
Dept: PHYSICAL THERAPY | Age: 60
Setting detail: THERAPIES SERIES
Discharge: HOME | End: 2020-12-21
Payer: COMMERCIAL

## 2020-12-21 PROCEDURE — 97161 PT EVAL LOW COMPLEX 20 MIN: CPT

## 2020-12-21 PROCEDURE — 97140 MANUAL THERAPY 1/> REGIONS: CPT

## 2020-12-21 PROCEDURE — 97110 THERAPEUTIC EXERCISES: CPT

## 2020-12-21 NOTE — PROGRESS NOTES
[address] [facility]. Wil[place] Vivas [address]  Phone: [phone]   Fax:     [phone]                                                       Arthur Cifuentes    Dear Dr. Navin Jarrett,    We had the pleasure of evaluating the following patient for physical therapy services at [facility]. A summary of our findings can be found in the initial assessment below. This includes our plan of care. If you have any questions or concerns regarding these findings, please do not hesitate to contact me at the office phone number checked above. Thank you for the referral.       Physician Signature:_______________________________Date:__________________  By signing above (or electronic signature), therapists plan is approved by physician          Patient: Marilee Beebe   : 1960   MRN: [id number]  Referring Physician: Referring Practitioner: Dr. Navin Jarrett      Evaluation Date: 2020      Medical Diagnosis Information:  Diagnosis: N33.840 (ICD-10-CM) - Status post right knee replacement   Treatment Diagnosis: Pain. Decreased R knee ROM and strength                                         Insurance information: PT Insurance Information: [address]      Precautions/ Contra-indications: Right TKR on 20  Latex Allergy:  [x]NO      []YES  Preferred Language for Healthcare:   [x]English       []other:    C-SSRS Triggered by Intake questionnaire (Past 2 wk assessment ):   [x] No, Questionnaire did not trigger screening.   [] Yes, Patient intake triggered C-SSRS Screening      [] C-SSRS Screening completed  [] PCP notified via Epic     SUBJECTIVE:   Patient stated complaint:Pt states she had right TKR on 20. Did home PT for a few weeks at home. Using the [employer] and sometimes without it at home.  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 Goal is to get back to work as [employer] at [employer] and to doing normal ADLs. Relevant Medical History:  HTN, Spinal Surgery, Left TKR  Functional Outcome Measure: LEFS = 26 (60-80%)    Pain Scale: 5/10  Easing factors: Rest, ICE   Provocative factors: activity     Type: [x]Constant   [x]Intermittent  []Radiating []Localized []other:     Numbness/Tingling:     Occupation/School:[employer] [facility] [place] [address] [place] [address] Avenue Level of Function: Independent with ADLs and IADLs,     OBJECTIVE:     Posture:     Functional Mobility/Transfers:     Palpation:     Bandages/Dressings/Incisions: Healing well, covered by tape. No redness or drainage notede    Gait: (include devices/WB status) Ambulating with SPC with mostly normal gait. Without SPC, had limp on the R and decreased knee flexion during swing phase. ROM LEFT RIGHT   HIP Flex     HIP Abd     HIP Ext     HIP IR     HIP ER     Knee ext 0 2   Knee Flex 142 105   Ankle PF     Ankle DF     Ankle In     Ankle Ev     Strength  LEFT RIGHT   HIP Flexors WNL 3/5   HIP Abductors     HIP Ext     Hip ER     Knee EXT (quad) WNL 3/5   Knee Flex (HS) WNL 3+/5   Ankle DF WNL WNL   Ankle PF     Ankle Inv     Ankle EV          Circumference  Mid apex  7 cm prox             Reflexes/Sensation:    [x]Dermatomes/Myotomes intact    [x]Reflexes equal and normal bilaterally   []Other:    Joint mobility:    []Normal    []Hypo   [x]Hyper    Orthopedic Special Tests:                        [x] Patient history, allergies, meds reviewed. Medical chart reviewed. See intake form. Review Of Systems (ROS):  [x]Performed Review of systems (Integumentary, CardioPulmonary, Neurological) by intake and observation. Intake form has been scanned into medical record. Patient has been instructed to contact their primary care physician regarding ROS issues if not already being addressed at this time.       Co-morbidities/Complexities (which will affect course of rehabilitation):   []None           Arthritic conditions   []Rheumatoid arthritis (M05.9)  [x]Osteoarthritis (M19.91)   Cardiovascular conditions   [x]Hypertension (I10)  []Hyperlipidemia (E78.5)  []Angina pectoris (I20)  []Atherosclerosis (I70)  []CVA Musculoskeletal conditions   []Disc pathology   []Congenital spine pathologies   []Prior surgical intervention  []Osteoporosis (M81.8)  [x]Osteopenia (M85.8)   Endocrine conditions   []Hypothyroid (E03.9)  []Hyperthyroid Gastrointestinal conditions   []Constipation (V09.15)   Metabolic conditions   []Morbid obesity (E66.01)  []Diabetes type 1(E10.65) or 2 (E11.65)   []Neuropathy (G60.9)     Pulmonary conditions   []Asthma (J45)  []Coughing   []COPD (J44.9)   Psychological Disorders  [x]Anxiety (F41.9)  [x]Depression (F32.9)   []Other:   []Other:          Barriers to/and or personal factors that will affect rehab potential:              []Age  []Sex    []Smoker              []Motivation/Lack of Motivation                        []Co-Morbidities              []Cognitive Function, education/learning barriers              []Environmental, home barriers              []profession/work barriers  []past PT/medical experience  []other:  Justification:     Falls Risk Assessment (30 days):   [x] Falls Risk assessed and no intervention required.   [] Falls Risk assessed and Patient requires intervention due to being higher risk   TUG score (>12s at risk):     [] Falls education provided, including         ASSESSMENT:   Functional Impairments:     []Noted lumbar/proximal hip/LE hypomobility   [x]Decreased LE functional ROM   []Decreased core/proximal hip strength and neuromuscular control   [x]Decreased LE functional strength   [x]Reduced balance/proprioceptive control   []other:      Functional Activity Limitations (from functional questionnaire and intake)   [x]Reduced ability to tolerate prolonged functional positions   [x]Reduced ability or difficulty with changes of positions or transfers between positions   []Reduced ability to maintain good posture and demonstrate good body mechanics with sitting, bending, and lifting   [x]Reduced ability to sleep   [x] Reduced ability or tolerance with driving and/or computer work   []Reduced ability to perform lifting, carrying tasks   [x]Reduced ability to squat   []Reduced ability to forward bend   [x]Reduced ability to ambulate prolonged functional periods/distances/surfaces   [x]Reduced ability to ascend/descend stairs   [x]Reduced ability to run, hop or jump   []other:     Participation Restrictions   [x]Reduced participation in self care activities   [x]Reduced participation in home management activities   [x]Reduced participation in work activities   [x]Reduced participation in social activities. [x]Reduced participation in sport activities. Classification :    [x]Signs/symptoms consistent with post-surgical status including decreased ROM, strength and function.    []Signs/symptoms consistent with joint sprain/strain   []Signs/symptoms consistent with patella-femoral syndrome   []Signs/symptoms consistent with knee OA/hip OA   []Signs/symptoms consistent with internal derangement of knee/Hip   []Signs/symptoms consistent with functional hip weakness/NMR control      []Signs/symptoms consistent with tendinitis/tendinosis    []signs/symptoms consistent with pathology which may benefit from Dry needling      []other:      Prognosis/Rehab Potential:      [x]Excellent   []Good    []Fair   []Poor    Tolerance of evaluation/treatment:    [x]Excellent   []Good    []Fair   []Poor    Physical Therapy Evaluation Complexity Justification  [x] A history of present problem with:  [x] no personal factors and/or comorbidities that impact the plan of care;  []1-2 personal factors and/or comorbidities that impact the plan of care  []3 personal factors and/or comorbidities that impact the plan of care  [x] An examination of body systems using standardized tests and measures addressing any of the following: body structures and functions (impairments), activity limitations, and/or participation restrictions;:  [] a total of 1-2 or more elements   [x] a total of 3 or more elements   [] a total of 4 or more elements   [x] A clinical presentation with:  [x] stable and/or uncomplicated characteristics   [] evolving clinical presentation with changing characteristics  [] unstable and unpredictable characteristics;   [x] Clinical decision making of [] low, [] moderate, [] high complexity using standardized patient assessment instrument and/or measurable assessment of functional outcome. [x] EVAL (LOW) 21515 (typically 20 minutes face-to-face)  [] EVAL (MOD) 81489 (typically 30 minutes face-to-face)  [] EVAL (HIGH) 28824 (typically 45 minutes face-to-face)  [] RE-EVAL     PLAN:  Frequency/Duration:  2 days per week for 6 Weeks:  Interventions:  [x]  Therapeutic exercise including: strength training, ROM, for Lower extremity and core   [x]  NMR activation and proprioception for LE, Glutes and Core   [x]  Manual therapy as indicated for LE, Hip and spine to include: Dry Needling/IASTM, STM, PROM, Gr I-IV mobilizations, manipulation. [x] Modalities as needed that may include: thermal agents, E-stim, Biofeedback, US, iontophoresis as indicated  [x] Patient education on joint protection, postural re-education, activity modification, progression of HEP. HEP instruction: Instructed patient on an HEP. Patient demonstrated exercises correctly. Handout with pictures and # of reps/sets was given. Exercises are: Seated, Supine, and standing knee flexion and extension stretch 3 x per day. HS and Gastroc stretch along with other HEP to do 2x per day. GOALS:  Patient stated goal: less pain, better sleep, normal activity  [] Progressing: [] Met: [] Not Met: [] Adjusted    Therapist goals for Patient:   Short Term Goals: To be achieved in: 2 weeks  1.  Independent in HEP and progression per patient tolerance, in order to prevent re-injury. [] Progressing: [] Met: [] Not Met: [] Adjusted  2. Patient will have a decrease in pain to facilitate improvement in movement, function, and ADLs as indicated by Functional Deficits. [] Progressing: [] Met: [] Not Met: [] Adjusted    Long Term Goals: To be achieved in: 6 weeks  1. Disability index score of 10% or less for the LEFS to assist with reaching prior level of function. [] Progressing: [] Met: [] Not Met: [] Adjusted  2. Patient will demonstrate increased AROM to 0-140 to allow for proper joint functioning as indicated by patients Functional Deficits. [] Progressing: [] Met: [] Not Met: [] Adjusted  3. Patient will demonstrate an increase in Strength to good proximal hip strength and control, within 5lb HHD in LE to allow for proper functional mobility as indicated by patients Functional Deficits. [] Progressing: [] Met: [] Not Met: [] Adjusted  4. Patient will return to normal functional activities without increased symptoms or restriction. [] Progressing: [] Met: [] Not Met: [] Adjusted  5. Pt will sleep through the night and be able to return to work (patient specific functional goal)    [] Progressing: [] Met: [] Not Met: [] Adjusted     Electronically signed by:  Myrna Murrieta PT      Note: If patient does not return for scheduled/recommended follow up visits, this note will serve as a discharge from care along with the most recent update on progress.

## 2020-12-22 NOTE — PROGRESS NOTES
MD JUAN DIEGO Dubon Board Certified in Sleep Medicine  Certified in [address] [place] [address] Certified in Neurology [address]  Liz Thompson [address],  [phone]   [address] [place] [address], [address] [facility]                      [address]  [address] [facility] [address] [phone]    Subjective:     Patient ID: Alina Atkinson is a 63 y.o. female. No chief complaint on file. This is a video visual telehealth visit at patient home, no physical exam performed Patient agreed for this visit. HPI:        Alina Atkinson is a 63 y.o. female was seen today as a 6 months follow for severe obstructive sleep apnea and EDS with apnea with apnea hypopnea index of 33/h with lowest O2 saturation of 79%. Patient is using the PAP machine about 100% of the time, more than 4 hours a nightabout  98 %, in total average of 8:19 hours a night in last 90 days. Currently on PAP at 12.8 cm (5-20), the AHI is only 2.6 events per hour at this pressure. Patient improved regarding daytime sleepiness and fatigue, wakes up refreshed in the morning. The Patient scored   on Elkins Sleepiness Scale ( more than 10 is indicative of daytime sleepiness)   Patient has no problem with PAP pressure or mask.   Uses Dreamwear nasal    DOT/CDL - N/A        Previous Report(s)Reviewed: historical medical records         Social History     Socioeconomic History    Marital status:      Spouse name: Not on file    Number of children: Not on file    Years of education: Not on file    Highest education level: Not on file   Occupational History    Not on file   Social Needs    Financial resource strain: Not on file    Food insecurity     Worry: Not on file     Inability: Not on file    Transportation needs     Medical: Not on file     Non-medical: Not on file Tobacco Use    Smoking status: Never Smoker    Smokeless tobacco: Never Used   Substance and Sexual Activity    Alcohol use: Yes     Comment: monthly, occasional drink    Drug use: No    Sexual activity: Yes     Partners: Male     Comment: hysterectomy   Lifestyle    Physical activity     Days per week: Not on file     Minutes per session: Not on file    Stress: Not on file   Relationships    Social connections     Talks on phone: Not on file     Gets together: Not on file     Attends [employer] service: Not on file     Active member of club or organization: Not on file     Attends meetings of clubs or organizations: Not on file     Relationship status: Not on file    Intimate partner violence     Fear of current or ex partner: Not on file     Emotionally abused: Not on file     Physically abused: Not on file     Forced sexual activity: Not on file   Other Topics Concern    Not on file   Social History Narrative    Not on file       Prior to Admission medications    Medication Sig Start Date End Date Taking? Authorizing Provider   doxepin (SINEQUAN) 10 MG capsule Take 1 capsule by mouth nightly 12/23/20  Yes Agapito Gandhi MD   cyclobenzaprine (FLEXERIL) 5 MG tablet Take 1 tablet by mouth every evening 12/8/20 3/8/21  Rama Atkinson MD   amitriptyline (ELAVIL) 25 MG tablet TAKE ONE TABLET BY MOUTH NIGHTLY 12/8/20   Rama Atkinson MD   cephALEXin (KEFLEX) 500 MG capsule Take 1 capsule by mouth See Admin Instructions Take one capsule at 9pm today and one capsule at 9am tomorrow 12/2/20   MINERVA Boyle CNP   aspirin EC 81 MG EC tablet Take 1 tablet by mouth 2 times daily for 14 days Please avoid missing doses.  12/2/20 12/16/20  MINERVA Boyle CNP   metoprolol tartrate (LOPRESSOR) 25 MG tablet Take 1 tablet by mouth 2 times daily 11/25/20 2/23/21  Rama Atkinson MD losartan-hydroCHLOROthiazide (HYZAAR) 100-12.5 MG per tablet TAKE 1 TABLET BY MOUTH ONCE DAILY 11/25/20   Sylwia Arnold MD   [facility]) 3 MG TABS tablet Take 1 tablet by mouth nightly  Patient taking differently: Take 3 mg by mouth nightly as needed  11/25/20   Sylwia Arnold MD   atorvastatin (LIPITOR) 80 MG tablet TAKE 1 TABLET BY MOUTH ONCE DAILY 11/25/20   Sylwia Arnold MD   Armodafinil (NUVIGIL) 150 MG TABS tablet one tab QAM prn 6/11/20 6/11/24  Osiris Herrera MD   fluticasone (FLONASE) 50 MCG/ACT nasal spray 1 spray by Nasal route daily as needed  3/23/16   Historical Provider, MD       Allergies as of 12/23/2020 - Review Complete 12/21/2020   Allergen Reaction Noted    Hydrocodone-acetaminophen Shortness Of Breath 01/04/2006    Vicodin [hydrocodone-acetaminophen] Shortness Of Breath 05/01/2013    Lisinopril  02/26/2020    Sansert [methysergide]  03/21/2018    Toradol [ketorolac tromethamine] Nausea Only 03/16/2020       Patient Active Problem List   Diagnosis    [facility] DJD(carpometacarpal degenerative joint disease), localized primary    Fibromyalgia    Essential hypertension    Mixed hyperlipidemia    Dysthymia    Metatarsalgia of right foot    History of total knee arthroplasty, left    Left shoulder pain    Rotator cuff tendonitis, left    DAVID (obstructive sleep apnea)    Arthritis of right knee       Past Medical History:   Diagnosis Date    HBP (high blood pressure)     Hyperlipidemia     Obstructive sleep apnea on CPAP     Plantar fasciitis     Postoperative nausea     per patient lasted 3 weeks after knee replacement surgery    Seasonal allergies        Past Surgical History:   Procedure Laterality Date    CARPAL TUNNEL RELEASE Right    [facility] SECTION  [id number], 1986, 1994    COLONOSCOPY  03/27/2018    Dr. Cornelia Roque with polyp    FOOT NEUROMA SURGERY Right     HAND SURGERY Left     Thumb joint    HYSTERECTOMY      LASIK      SHOULDER SURGERY      left  SPINAL FUSION      L4    TOTAL KNEE ARTHROPLASTY Left 5/26/2020    ROBOTIC ASSISTED LEFT TOTAL KNEE REPLACEMENT performed by Tiffany Goodman MD at [address] Right 12/2/2020    RIGHT TOTAL KNEE REPLACEMENT ROBOTIC ASSISTED performed by Tiffany Goodman MD at [facility] History   Problem Relation Age of Onset    Arthritis Other     Asthma Other     Cancer Other     Diabetes Other     High Blood Pressure Other     Breast Cancer Mother     Other Mother        Review of Systems    Objective:     Vitals:  Weight BMI Neck circumference    Wt Readings from Last 3 Encounters:   12/17/20 137 lb (62.1 kg)   12/02/20 137 lb 12.6 oz (62.5 kg)   11/25/20 139 lb (63 kg)    There is no height or weight on file to calculate BMI. BP HR SaO2   BP Readings from Last 3 Encounters:   12/02/20 (!) 117/57   12/02/20 114/66   11/25/20 132/60    Pulse Readings from Last 3 Encounters:   12/02/20 79   11/25/20 63   11/23/20 74    SpO2 Readings from Last 3 Encounters:   12/02/20 95%   12/02/20 97%   11/25/20 98%        Themandibular molar Class :   [x]1 []2 []3      Mallampati I Airway Classification:   []1 []2 []3 [x]4      Physical Exam    :   Severe Obstructive Sleep Apnea/Hypopnea Syndrome under good control on PAP at 12.8 cmwp. Diagnosis Orders   1. DAVID on CPAP     2. Dependence on other enabling machines and devices     3. Insomnia, unspecified type  doxepin (SINEQUAN) 10 MG capsule     Plan: Will continue the PAP at 5-20 cmwp. I will order PAP supplies, mask, filters. ... No orders of the defined types were placed in this encounter. Return in about 1 year (around 12/23/2021) for Reveiwing CPAP usage and compliance report and tro, insomnia.     Blanchie Mcburney, MD  Medical Director [facility]

## 2020-12-22 NOTE — FLOWSHEET NOTE
[facility] - Outpatient Rehabilitation & Therapy  [address]. [place]  Phone: [phone]   Fax:     [phone]      Physical Therapy Treatment Note/ Progress Report:     Date:  2020    Patient Name:  Faith Klein    :  1960  MRN: [id number]    Pertinent Medical History:Additional Pertinent Hx: HTN, Spinal Surgery, Left TKR  Medical/Treatment Diagnosis Information:  · Diagnosis: P32.149 (ICD-10-CM) - Status post right knee replacement  · Treatment Diagnosis: Pain. Decreased R knee ROM and strength  Insurance/Certification information:  PT Insurance Information: [address] [facility], [place]  Physician Information:  Referring Practitioner: Dr. Esequiel Mchugh of care signed (Y/N):     Date of Patient follow up with Physician:      Progress Report: []  Yes  [x]  No     Date Range for reporting period:  Beginnin2020  Ending:      Progress report due (10 Rx/or 30 days whichever is less):      Recertification due (POC duration/ or 90 days whichever is less): 3/21/20     Visit # POC/Insurance Allowable Auth Needed   1 12 []Yes   []No     Latex Allergy:  [x]NO      []YES  Preferred Language for Healthcare:   [x]English       []Other:    Functional Scale:      Date assessed: at eval  Test: LEFS  Score:26 (60-80%)    Pain level:  5/10     History of Injury:   Pt states she had right TKR on 20. Did home PT for a few weeks at home. Using the [address] and sometimes without it at home. 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 Goal is to get back to work as [employer] at [employer] and to doing normal ADLs.      SUBJECTIVE:      OBJECTIVE:   Observation:    Test measurements:      RESTRICTIONS/PRECAUTIONS: R TKR on 20    Exercises/Interventions:     Therapeutic Ex (14016)   Min: Reps/Resistance Notes/CUES   Nu Step     Incline     HSS/HQS     Heel Slide SAQ               Manual Intervention (73443)  Min:     Knee mobs/PROM R knee flexion and extension stretches with OP    Tib/Fem Mobs     Patella Mobs Right all directions    Ankle mobs               NMR re-education (11495)  Min:  CUES NEEDED             Therapeutic Activity (23207)  Min:     Gait training          Modalities  Min:     IFC with      CP after exercises x    MH after exercises            Other Therapeutic Activities: Pt was educated on PT POC, Diagnosis, Prognosis, pathomechanics as well as frequency and duration of scheduling future physical therapy appointments. Time was also taken on this day to answer all patient questions and participation in PT. Reviewed appointment policy in detail with patient and patient verbalized understanding. Home Exercise Program: Instructed patient on an HEP. Patient demonstrated exercises correctly. Handout with pictures and # of reps/sets was given. Exercises are: Seated, Supine, and standing knee flexion and extension stretch 3 x per day. HS and Gastroc stretch along with other HEP to do 2x per day. Given Handout      Therapeutic Exercise and NMR EXR  [x] (99718) Provided verbal/tactile cueing for activities related to strengthening, flexibility, endurance, ROM for improvements in LE, proximal hip, and core control with self care, mobility, lifting, ambulation. [x] (75980) Provided verbal/tactile cueing for activities related to improving balance, coordination, kinesthetic sense, posture, motor skill, proprioception  to assist with LE, proximal hip, and core control in self care, mobility, lifting, ambulation and eccentric single leg control.      NMR and Therapeutic Activities:    [] (79763 or 40004) Provided verbal/tactile cueing for activities related to improving balance, coordination, kinesthetic sense, posture, motor skill, proprioception and motor activation to allow for proper function of core, proximal hip and LE with self care and ADLs and functional mobility.   [] (19755) Gait Re-education- Provided training and instruction to the patient for proper LE, core and proximal hip recruitment and positioning and eccentric body weight control with ambulation re-education including up and down stairs     Home Exercise Program:    [x] (41004) Reviewed/Progressed HEP activities related to strengthening, flexibility, endurance, ROM of core, proximal hip and LE for functional self-care, mobility, lifting and ambulation/stair navigation   [] (61027)Reviewed/Progressed HEP activities related to improving balance, coordination, kinesthetic sense, posture, motor skill, proprioception of core, proximal hip and LE for self care, mobility, lifting, and ambulation/stair navigation      Manual Treatments:  PROM / STM / Oscillations-Mobs:  G-I, II, III, IV (PA's, Inf., Post.)  [x] (73307) Provided manual therapy to mobilize LE, proximal hip and/or LS spine soft tissue/joints for the purpose of modulating pain, promoting relaxation,  increasing ROM, reducing/eliminating soft tissue swelling/inflammation/restriction, improving soft tissue extensibility and allowing for proper ROM for normal function with self care, mobility, lifting and ambulation. If [facility] Please Indicate Time In/Out  CPT Code Time in Time out                         Charges:  Timed Code Treatment Minutes: 29   Total Treatment Minutes: 46      [x] EVAL (LOW) 37991 (typically 20 minutes face-to-face)  [] EVAL (MOD) 27627 (typically 30 minutes face-to-face)  [] EVAL (HIGH) 99487 (typically 45 minutes face-to-face)  [] RE-EVAL     [x] PH(17656) x     [] Dry needle 1 or 2 Muscles (03736)  [] NMR (43008) x     [] Dry needle 3+ Muscles (55571)  [x] Manual (94097) x     [] Ultrasound (11114) x  [] TA (08547) x     [] Mech Traction (22175)  [] ES(attended) (48250)     [] ES (un) (05497):   [] Vasopump (23819) [] Ionto (42764)   [] Other:    Casey Abts stated goal: less pain, better sleep, normal activity  []? Progressing: []? Met: []? Not Met: []? Adjusted     Therapist goals for Patient:   Short Term Goals: To be achieved in: 2 weeks  1. Independent in HEP and progression per patient tolerance, in order to prevent re-injury. []? Progressing: []? Met: []? Not Met: []? Adjusted  2. Patient will have a decrease in pain to facilitate improvement in movement, function, and ADLs as indicated by Functional Deficits. []? Progressing: []? Met: []? Not Met: []? Adjusted     Long Term Goals: To be achieved in: 6 weeks  1. Disability index score of 10% or less for the LEFS to assist with reaching prior level of function. []? Progressing: []? Met: []? Not Met: []? Adjusted  2. Patient will demonstrate increased AROM to 0-140 to allow for proper joint functioning as indicated by patients Functional Deficits. []? Progressing: []? Met: []? Not Met: []? Adjusted  3. Patient will demonstrate an increase in Strength to good proximal hip strength and control, within 5lb HHD in LE to allow for proper functional mobility as indicated by patients Functional Deficits. []? Progressing: []? Met: []? Not Met: []? Adjusted  4. Patient will return to normal functional activities without increased symptoms or restriction. []? Progressing: []? Met: []? Not Met: []? Adjusted  5. Pt will sleep through the night and be able to return to work (patient specific functional goal)    []? Progressing: []? Met: []? Not Met: []? Adjusted                 ASSESSMENT:  See eval    Treatment/Activity Tolerance:  [x] Patient tolerated treatment well [] Patient limited by fatique  [] Patient limited by pain  [] Patient limited by other medical complications  [] Other:     Overall Progression Towards Functional goals/ Treatment Progress Update:  [] Patient is progressing as expected towards functional goals listed. [] Progression is slowed due to complexities/Impairments listed. [] Progression has been slowed due to co-morbidities.   [x] Plan just implemented, too soon to assess goals progression <30days   [] Goals require adjustment due to lack of progress  [] Patient is not progressing as expected and requires additional follow up with physician  [] Other    Prognosis for POC: [x] Good [] Fair  [] Poor    Patient requires continued skilled intervention: [x] Yes  [] No        PLAN:   Manual to improve ROM  Progress strengthening as able  Gait  CP  *Assess left shoulder and give HEP when able    [] Continue per plan of care [] Alter current plan (see comments)  [x] Plan of care initiated [] Hold pending MD visit [] Discharge    Electronically signed by: Alva Nassar PT    Note: If patient does not return for scheduled/recommended follow up visits, this note will serve as a discharge from care along with the most recent update on progress.

## 2020-12-23 ENCOUNTER — HOSPITAL ENCOUNTER (OUTPATIENT)
Dept: PHYSICAL THERAPY | Age: 60
Setting detail: THERAPIES SERIES
Discharge: HOME | End: 2020-12-23
Payer: COMMERCIAL

## 2020-12-23 ENCOUNTER — VIRTUAL VISIT (OUTPATIENT)
Dept: SLEEP MEDICINE | Age: 60
End: 2020-12-23
Payer: COMMERCIAL

## 2020-12-23 PROCEDURE — 97110 THERAPEUTIC EXERCISES: CPT

## 2020-12-23 PROCEDURE — 99213 OFFICE O/P EST LOW 20 MIN: CPT | Performed by: PSYCHIATRY & NEUROLOGY

## 2020-12-23 PROCEDURE — 97140 MANUAL THERAPY 1/> REGIONS: CPT

## 2020-12-23 RX ORDER — DOXEPIN HYDROCHLORIDE 10 MG/1
10 CAPSULE ORAL NIGHTLY
Qty: 30 CAPSULE | Refills: 5 | Status: SHIPPED | OUTPATIENT
Start: 2020-12-23 | End: 2021-02-15

## 2020-12-23 NOTE — FLOWSHEET NOTE
[facility] - Outpatient Rehabilitation & Therapy  [address]. [place]  Phone: [phone]   Fax:     [phone]      Physical Therapy Treatment Note/ Progress Report:     Date:  2020    Patient Name:  Edgar Giang    :  1960  MRN: [id number]    Pertinent Medical History:Additional Pertinent Hx: HTN, Spinal Surgery, Left TKR  Medical/Treatment Diagnosis Information:  · Diagnosis: J39.633 (ICD-10-CM) - Status post right knee replacement  · Treatment Diagnosis: Pain. Decreased R knee ROM and strength  Insurance/Certification information:  PT Insurance Information: [address] [facility]  Physician Information:  Referring Practitioner: Dr. Charley Castillo of care signed (Y/N):     Date of Patient follow up with Physician:      Progress Report: []  Yes  [x]  No     Date Range for reporting period:  Beginnin2020  Ending:      Progress report due (10 Rx/or 30 days whichever is less):      Recertification due (POC duration/ or 90 days whichever is less): 3/21/20     Visit # POC/Insurance Allowable Auth Needed   2 12 []Yes   []No     Latex Allergy:  [x]NO      []YES  Preferred Language for Healthcare:   [x]English       []Other:    Functional Scale:      Date assessed: at eval  Test: LEFS  Score:26 (60-80%)    Pain level:  5/10     History of Injury:   Pt states she had right TKR on 20. Did home PT for a few weeks at home. Using the [facility] and sometimes without it at home. 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 Goal is to get back to work as [employer] at [employer] and to doing normal ADLs.      SUBJECTIVE:     20: Pt states she is having a good day today, was sore yesterday    OBJECTIVE:   Observation:    Test measurements:     20: 0-115 deg PROM R    RESTRICTIONS/PRECAUTIONS: R TKR on 20    Exercises/Interventions: Therapeutic Ex (69738)   Min: Reps/Resistance Notes/CUES   Nu Step X 5 min, level 1    Incline 3 x 30\"    HSS/HQS 3 x 30\"    Heel Slide X 20  Right    SAQ               Manual Intervention (88204)  Min:     Knee mobs/PROM R knee flexion and extension stretches with OP    Tib/Fem Mobs     Patella Mobs Right all directions    Ankle mobs               NMR re-education (62366)  Min:  CUES NEEDED             Therapeutic Activity (39991)  Min:     Gait training          Modalities  Min:     IFC with      CP after exercises x    MH after exercises            Other Therapeutic Activities: Pt was educated on PT POC, Diagnosis, Prognosis, pathomechanics as well as frequency and duration of scheduling future physical therapy appointments. Time was also taken on this day to answer all patient questions and participation in PT. Reviewed appointment policy in detail with patient and patient verbalized understanding. Home Exercise Program: Instructed patient on an HEP. Patient demonstrated exercises correctly. Handout with pictures and # of reps/sets was given. Exercises are: Seated, Supine, and standing knee flexion and extension stretch 3 x per day. HS and Gastroc stretch along with other HEP to do 2x per day. Given Handout      Therapeutic Exercise and NMR EXR  [x] (99880) Provided verbal/tactile cueing for activities related to strengthening, flexibility, endurance, ROM for improvements in LE, proximal hip, and core control with self care, mobility, lifting, ambulation. [x] (12702) Provided verbal/tactile cueing for activities related to improving balance, coordination, kinesthetic sense, posture, motor skill, proprioception  to assist with LE, proximal hip, and core control in self care, mobility, lifting, ambulation and eccentric single leg control.      NMR and Therapeutic Activities:    [] (89803 or 20526) Provided verbal/tactile cueing for activities related to improving balance, coordination, kinesthetic sense, posture, motor skill, proprioception and motor activation to allow for proper function of core, proximal hip and LE with self care and ADLs and functional mobility.   [] (39299) Gait Re-education- Provided training and instruction to the patient for proper LE, core and proximal hip recruitment and positioning and eccentric body weight control with ambulation re-education including up and down stairs     Home Exercise Program:    [x] (97552) Reviewed/Progressed HEP activities related to strengthening, flexibility, endurance, ROM of core, proximal hip and LE for functional self-care, mobility, lifting and ambulation/stair navigation   [] (97825)Reviewed/Progressed HEP activities related to improving balance, coordination, kinesthetic sense, posture, motor skill, proprioception of core, proximal hip and LE for self care, mobility, lifting, and ambulation/stair navigation      Manual Treatments:  PROM / STM / Oscillations-Mobs:  G-I, II, III, IV (PA's, Inf., Post.)  [x] (56455) Provided manual therapy to mobilize LE, proximal hip and/or LS spine soft tissue/joints for the purpose of modulating pain, promoting relaxation,  increasing ROM, reducing/eliminating soft tissue swelling/inflammation/restriction, improving soft tissue extensibility and allowing for proper ROM for normal function with self care, mobility, lifting and ambulation.      If [facility] Please Indicate Time In/Out  CPT Code Time in Time out                         Charges:  Timed Code Treatment Minutes: 33   Total Treatment Minutes: 37      [] EVAL (LOW) 94935 (typically 20 minutes face-to-face)  [] EVAL (MOD) 86182 (typically 30 minutes face-to-face)  [] EVAL (HIGH) 16726 (typically 45 minutes face-to-face)  [] RE-EVAL     [x] EQ(04503) x     [] Dry needle 1 or 2 Muscles (35343)  [] NMR (64228) x     [] Dry needle 3+ Muscles (97379)  [x] Manual (21295) x     [] Ultrasound (43663) x  [] TA (23673) x     [] Mech Traction (19352)  [] ES(attended) (77488)     [] [] Progression is slowed due to complexities/Impairments listed. [] Progression has been slowed due to co-morbidities. [x] Plan just implemented, too soon to assess goals progression <30days   [] Goals require adjustment due to lack of progress  [] Patient is not progressing as expected and requires additional follow up with physician  [] Other    Prognosis for POC: [x] Good [] Fair  [] Poor    Patient requires continued skilled intervention: [x] Yes  [] No        PLAN:   Manual to improve ROM  Progress strengthening as able  Gait  CP  *Assess left shoulder and give HEP when able    [x] Continue per plan of care [] Alter current plan (see comments)  [] Plan of care initiated [] Hold pending MD visit [] Discharge    Electronically signed by: Janet Palomares PT    Note: If patient does not return for scheduled/recommended follow up visits, this note will serve as a discharge from care along with the most recent update on progress.

## 2020-12-23 NOTE — PROGRESS NOTES
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 Humidifier: [] Heated ()        [x] Water chamber replacement ()/ 1 per 6 months        Mask:   [x] Nasal () /1 per 3 months [] Full Face () /1 per 3 months   [x] Patient choice -Size and fit mask [] Patient Choice - Size and fit mask   [] Dispense:  [] Dispense:    [x] Headgear () / 1 per 3 months [] Headgear () / 1 per 3 months   [x] Replacement Nasal Cushion ()/2 per month [] Interface Replacement ()/1 per month   [x] Replacement Nasal Pillows ()/2 per month         Tubing: [x] Heated ()/1 per 3 months    [] Standard ()/1 per 3 months [] Other:           Filters: [x] Non-disposable ()/1 per 6 months     [x] Ultra-Fine, Disposable ()/2 per month        Miscellaneous: [] Chin Strap ()/ 1 per 6 months [] O2 bleed-in:       LPM   [] Oximetry on CPAP/Bilevel []  Other:          Start Order Date: 20    MEDICAL JUSTIFICATION:  I, the undersigned, certify that the above prescribed supplies are medically necessary for this patients wellbeing. In my opinion, the supplies are both reasonable and necessary in reference to accepted standards of medicalpractice in treatment of this patients condition.     Eileen You MD      NPI: [id number]       Order Signed Date: 20    Electronically signed by Eileen You MD on 12/23/2020 at 8:45 AM

## 2020-12-29 ENCOUNTER — HOSPITAL ENCOUNTER (OUTPATIENT)
Dept: PHYSICAL THERAPY | Age: 60
Setting detail: THERAPIES SERIES
Discharge: HOME | End: 2020-12-29
Payer: COMMERCIAL

## 2020-12-29 PROCEDURE — 97140 MANUAL THERAPY 1/> REGIONS: CPT

## 2020-12-29 PROCEDURE — G0283 ELEC STIM OTHER THAN WOUND: HCPCS

## 2020-12-29 PROCEDURE — 97110 THERAPEUTIC EXERCISES: CPT

## 2020-12-29 NOTE — FLOWSHEET NOTE
[facility] - Outpatient Rehabilitation & Therapy  [address]. [place]  Phone: [phone]   Fax:     [phone]      Physical Therapy Treatment Note/ Progress Report:     Date:  2020    Patient Name:  Rajendra Trinidad    :  1960  MRN: [id number]    Pertinent Medical History:Additional Pertinent Hx: HTN, Spinal Surgery, Left TKR  Medical/Treatment Diagnosis Information:  · Diagnosis: Z12.275 (ICD-10-CM) - Status post right knee replacement  · Treatment Diagnosis: Pain. Decreased R knee ROM and strength  Insurance/Certification information:  PT Insurance Information: [address] [facility]  Physician Information:  Referring Practitioner: Dr. Denise Estimable of care signed (Y/N):     Date of Patient follow up with Physician:      Progress Report: []  Yes  [x]  No     Date Range for reporting period:  Beginnin2020  Ending:      Progress report due (10 Rx/or 30 days whichever is less): 49     Recertification due (POC duration/ or 90 days whichever is less): 3/21/20     Visit # POC/Insurance Allowable Auth Needed   3 12 []Yes   []No     Latex Allergy:  [x]NO      []YES  Preferred Language for Healthcare:   [x]English       []Other:    Functional Scale:      Date assessed: at eval  Test: LEFS  Score:26 (60-80%)    Pain level:  5/10     History of Injury:   Pt states she had right TKR on 20. Did home PT for a few weeks at home. Using the [facility] and sometimes without it at home. 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 Goal is to get back to work as [employer] at [employer] and to doing normal ADLs. SUBJECTIVE:     20: Pt states she is having a good day today, was sore yesterday  20:Knee is moving better. States her ankle has been bothering her some.      OBJECTIVE:   Observation:    Test measurements:     20: 0-115 deg PROM R   12/29/20\" 0-125 deg PROM    RESTRICTIONS/PRECAUTIONS: R TKR on 12/2/20    Exercises/Interventions:     Therapeutic Ex (85825)   Min: Reps/Resistance Notes/CUES   Nu Step X 5 min, level 1    Incline 3 x 30\"    HSS/HQS 3 x 30\"    Heel Slide X 20  Right    SAQ 2 x 20 R 1.5#    Leg Press 75# 2 x 20         Manual Intervention (80847)  Min:     Knee mobs/PROM R knee flexion and extension stretches with OP    Tib/Fem Mobs     Patella Mobs Right all directions    Ankle mobs               NMR re-education (35338)  Min:  CUES NEEDED             Therapeutic Activity (78216)  Min:     Gait training          Modalities  Min:     IFC with      CP after exercises x    MH after exercises            Other Therapeutic Activities: Pt was educated on PT POC, Diagnosis, Prognosis, pathomechanics as well as frequency and duration of scheduling future physical therapy appointments. Time was also taken on this day to answer all patient questions and participation in PT. Reviewed appointment policy in detail with patient and patient verbalized understanding. Home Exercise Program: Instructed patient on an HEP. Patient demonstrated exercises correctly. Handout with pictures and # of reps/sets was given. Exercises are: Seated, Supine, and standing knee flexion and extension stretch 3 x per day. HS and Gastroc stretch along with other HEP to do 2x per day. Given Handout      Therapeutic Exercise and NMR EXR  [x] (82889) Provided verbal/tactile cueing for activities related to strengthening, flexibility, endurance, ROM for improvements in LE, proximal hip, and core control with self care, mobility, lifting, ambulation. [x] (87033) Provided verbal/tactile cueing for activities related to improving balance, coordination, kinesthetic sense, posture, motor skill, proprioception  to assist with LE, proximal hip, and core control in self care, mobility, lifting, ambulation and eccentric single leg control.      NMR and Therapeutic Activities:    [] (78162 or 62987) Provided verbal/tactile cueing for activities related to improving balance, coordination, kinesthetic sense, posture, motor skill, proprioception and motor activation to allow for proper function of core, proximal hip and LE with self care and ADLs and functional mobility.   [] (92857) Gait Re-education- Provided training and instruction to the patient for proper LE, core and proximal hip recruitment and positioning and eccentric body weight control with ambulation re-education including up and down stairs     Home Exercise Program:    [x] (42661) Reviewed/Progressed HEP activities related to strengthening, flexibility, endurance, ROM of core, proximal hip and LE for functional self-care, mobility, lifting and ambulation/stair navigation   [] (71252)Reviewed/Progressed HEP activities related to improving balance, coordination, kinesthetic sense, posture, motor skill, proprioception of core, proximal hip and LE for self care, mobility, lifting, and ambulation/stair navigation      Manual Treatments:  PROM / STM / Oscillations-Mobs:  G-I, II, III, IV (PA's, Inf., Post.)  [x] (16656) Provided manual therapy to mobilize LE, proximal hip and/or LS spine soft tissue/joints for the purpose of modulating pain, promoting relaxation,  increasing ROM, reducing/eliminating soft tissue swelling/inflammation/restriction, improving soft tissue extensibility and allowing for proper ROM for normal function with self care, mobility, lifting and ambulation.      If [facility] Please Indicate Time In/Out  CPT Code Time in Time out                         Charges:  Timed Code Treatment Minutes: 45   Total Treatment Minutes: 45      [] EVAL (LOW) 51932 (typically 20 minutes face-to-face)  [] EVAL (MOD) 59088 (typically 30 minutes face-to-face)  [] EVAL (HIGH) 67822 (typically 45 minutes face-to-face)  [] RE-EVAL     [x] BQ(04211) x 2    [] Dry needle 1 or 2 Muscles (32834)  [] NMR (60015) x     [] Dry needle 3+ Overall Progression Towards Functional goals/ Treatment Progress Update:  [] Patient is progressing as expected towards functional goals listed. [] Progression is slowed due to complexities/Impairments listed. [] Progression has been slowed due to co-morbidities. [x] Plan just implemented, too soon to assess goals progression <30days   [] Goals require adjustment due to lack of progress  [] Patient is not progressing as expected and requires additional follow up with physician  [] Other    Prognosis for POC: [x] Good [] Fair  [] Poor    Patient requires continued skilled intervention: [x] Yes  [] No        PLAN:   Manual to improve ROM  Progress strengthening as able  Gait  CP  *Assess left shoulder and give HEP when able    [x] Continue per plan of care [] Alter current plan (see comments)  [] Plan of care initiated [] Hold pending MD visit [] Discharge    Electronically signed by: Kenyon Escobar, PT    Note: If patient does not return for scheduled/recommended follow up visits, this note will serve as a discharge from care along with the most recent update on progress.

## 2020-12-30 ENCOUNTER — HOSPITAL ENCOUNTER (OUTPATIENT)
Dept: PHYSICAL THERAPY | Age: 60
Setting detail: THERAPIES SERIES
Discharge: HOME | End: 2020-12-30
Payer: COMMERCIAL

## 2020-12-30 PROCEDURE — 97140 MANUAL THERAPY 1/> REGIONS: CPT

## 2020-12-30 PROCEDURE — 97110 THERAPEUTIC EXERCISES: CPT

## 2020-12-30 NOTE — FLOWSHEET NOTE
[facility] - Outpatient Rehabilitation & Therapy  [address]. [place]  Phone: [phone]   Fax:     [phone]      Physical Therapy Treatment Note/ Progress Report:     Date:  2020    Patient Name:  Echo Suarez    :  1960  MRN: [id number]    Pertinent Medical History:Additional Pertinent Hx: HTN, Spinal Surgery, Left TKR  Medical/Treatment Diagnosis Information:  · Diagnosis: P23.818 (ICD-10-CM) - Status post right knee replacement  · Treatment Diagnosis: Pain. Decreased R knee ROM and strength  Insurance/Certification information:  PT Insurance Information: [address] [facility]  Physician Information:  Referring Practitioner: Dr. Rell Sommers of care signed (Y/N):     Date of Patient follow up with Physician:      Progress Report: []  Yes  [x]  No     Date Range for reporting period:  Beginnin2020  Ending:      Progress report due (10 Rx/or 30 days whichever is less):      Recertification due (POC duration/ or 90 days whichever is less): 3/21/20     Visit # POC/Insurance Allowable Auth Needed   4 12 []Yes   []No     Latex Allergy:  [x]NO      []YES  Preferred Language for Healthcare:   [x]English       []Other:    Functional Scale:      Date assessed: at eval  Test: LEFS  Score:26 (60-80%)    Pain level:  5/10     History of Injury:   Pt states she had right TKR on 20. Did home PT for a few weeks at home. Using the [facility] and sometimes without it at home. 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 Goal is to get back to work as [employer] at [employer] and to doing normal ADLs. SUBJECTIVE:     20: Pt states she is having a good day today, was sore yesterday  20:Knee is moving better. States her ankle has been bothering her some.    20: Pt states she is feeling pretty good today, little better    OBJECTIVE:   Observation:    Test measurements:     12/23/20: 0-115 deg PROM R   12/29/20: 0-125 deg PROM   12/30/20: 0-130 deg PROM    RESTRICTIONS/PRECAUTIONS: R TKR on 12/2/20    Exercises/Interventions:     Therapeutic Ex (27606)   Min: Reps/Resistance Notes/CUES   Nu Step X 5 min, level 3    Incline 3 x 30\"    HSS/HQS 3 x 30\"    Heel Slide X 20  Right    SAQ 2 x 20 R 1.5#    Leg Press 75# 2 x 20         Manual Intervention (09870)  Min:     Knee mobs/PROM R knee flexion and extension stretches with OP    Tib/Fem Mobs     Patella Mobs Right all directions    Ankle mobs               NMR re-education (80844)  Min:  CUES NEEDED             Therapeutic Activity (08001)  Min:     Gait training          Modalities  Min:     IFC with      CP after exercises x    MH after exercises            Other Therapeutic Activities: Pt was educated on PT POC, Diagnosis, Prognosis, pathomechanics as well as frequency and duration of scheduling future physical therapy appointments. Time was also taken on this day to answer all patient questions and participation in PT. Reviewed appointment policy in detail with patient and patient verbalized understanding. Home Exercise Program: Instructed patient on an HEP. Patient demonstrated exercises correctly. Handout with pictures and # of reps/sets was given. Exercises are: Seated, Supine, and standing knee flexion and extension stretch 3 x per day. HS and Gastroc stretch along with other HEP to do 2x per day. Given Handout      Therapeutic Exercise and NMR EXR  [x] (03153) Provided verbal/tactile cueing for activities related to strengthening, flexibility, endurance, ROM for improvements in LE, proximal hip, and core control with self care, mobility, lifting, ambulation.   [x] (36475) Provided verbal/tactile cueing for activities related to improving balance, coordination, kinesthetic sense, posture, motor skill, proprioception  to assist with LE, proximal hip, and core control in self care, mobility, lifting, ambulation and eccentric single leg control. NMR and Therapeutic Activities:    [] (01185 or 35641) Provided verbal/tactile cueing for activities related to improving balance, coordination, kinesthetic sense, posture, motor skill, proprioception and motor activation to allow for proper function of core, proximal hip and LE with self care and ADLs and functional mobility.   [] (22688) Gait Re-education- Provided training and instruction to the patient for proper LE, core and proximal hip recruitment and positioning and eccentric body weight control with ambulation re-education including up and down stairs     Home Exercise Program:    [x] (38767) Reviewed/Progressed HEP activities related to strengthening, flexibility, endurance, ROM of core, proximal hip and LE for functional self-care, mobility, lifting and ambulation/stair navigation   [] (99594)Reviewed/Progressed HEP activities related to improving balance, coordination, kinesthetic sense, posture, motor skill, proprioception of core, proximal hip and LE for self care, mobility, lifting, and ambulation/stair navigation      Manual Treatments:  PROM / STM / Oscillations-Mobs:  G-I, II, III, IV (PA's, Inf., Post.)  [x] (51158) Provided manual therapy to mobilize LE, proximal hip and/or LS spine soft tissue/joints for the purpose of modulating pain, promoting relaxation,  increasing ROM, reducing/eliminating soft tissue swelling/inflammation/restriction, improving soft tissue extensibility and allowing for proper ROM for normal function with self care, mobility, lifting and ambulation.      If [facility] Please Indicate Time In/Out  CPT Code Time in Time out                         Charges:  Timed Code Treatment Minutes: 45   Total Treatment Minutes: 50      [] EVAL (LOW) 88004 (typically 20 minutes face-to-face)  [] EVAL (MOD) 49985 (typically 30 minutes face-to-face)  [] EVAL (HIGH) 37106 (typically 45 minutes face-to-face)  [] RE-EVAL     [x] DQ(36096) x 2    [] Dry needle 1 or 2 Muscles (27885)  [] NMR (21029) x     [] Dry needle 3+ Muscles (45370)  [x] Manual (41547) x     [] Ultrasound (53947) x  [] TA (73769) x     [] Mech Traction (04644)  [x] ES(attended) (30238)     [] ES (un) (73853):   [] Vasopump (69146) [] Ionto (74571)   [] Other:    Ava Carbone stated goal: less pain, better sleep, normal activity  []? Progressing: []? Met: []? Not Met: []? Adjusted     Therapist goals for Patient:   Short Term Goals: To be achieved in: 2 weeks  1. Independent in HEP and progression per patient tolerance, in order to prevent re-injury. []? Progressing: []? Met: []? Not Met: []? Adjusted  2. Patient will have a decrease in pain to facilitate improvement in movement, function, and ADLs as indicated by Functional Deficits. []? Progressing: []? Met: []? Not Met: []? Adjusted     Long Term Goals: To be achieved in: 6 weeks  1. Disability index score of 10% or less for the LEFS to assist with reaching prior level of function. []? Progressing: []? Met: []? Not Met: []? Adjusted  2. Patient will demonstrate increased AROM to 0-140 to allow for proper joint functioning as indicated by patients Functional Deficits. []? Progressing: []? Met: []? Not Met: []? Adjusted  3. Patient will demonstrate an increase in Strength to good proximal hip strength and control, within 5lb HHD in LE to allow for proper functional mobility as indicated by patients Functional Deficits. []? Progressing: []? Met: []? Not Met: []? Adjusted  4. Patient will return to normal functional activities without increased symptoms or restriction. []? Progressing: []? Met: []? Not Met: []? Adjusted  5. Pt will sleep through the night and be able to return to work (patient specific functional goal)    []? Progressing: []? Met: []? Not Met: []?  Adjusted                 ASSESSMENT:  See eval    Treatment/Activity Tolerance:  [x] Patient tolerated treatment well [] Patient limited by fatique  [] Patient limited by pain  [] Patient limited by other medical complications  [] Other:     Overall Progression Towards Functional goals/ Treatment Progress Update:  [] Patient is progressing as expected towards functional goals listed. [] Progression is slowed due to complexities/Impairments listed. [] Progression has been slowed due to co-morbidities. [x] Plan just implemented, too soon to assess goals progression <30days   [] Goals require adjustment due to lack of progress  [] Patient is not progressing as expected and requires additional follow up with physician  [] Other    Prognosis for POC: [x] Good [] Fair  [] Poor    Patient requires continued skilled intervention: [x] Yes  [] No        PLAN:   Manual to improve ROM  Progress strengthening as able  Gait  CP  *Assess left shoulder and give HEP when able    [x] Continue per plan of care [] Alter current plan (see comments)  [] Plan of care initiated [] Hold pending MD visit [] Discharge    Electronically signed by: Jan De Los Santos PT    Note: If patient does not return for scheduled/recommended follow up visits, this note will serve as a discharge from care along with the most recent update on progress.

## 2021-01-04 ENCOUNTER — HOSPITAL ENCOUNTER (OUTPATIENT)
Dept: PHYSICAL THERAPY | Age: 61
Setting detail: THERAPIES SERIES
Discharge: HOME | End: 2021-01-04
Payer: COMMERCIAL

## 2021-01-04 PROCEDURE — 97110 THERAPEUTIC EXERCISES: CPT

## 2021-01-04 PROCEDURE — 97140 MANUAL THERAPY 1/> REGIONS: CPT

## 2021-01-04 PROCEDURE — G0283 ELEC STIM OTHER THAN WOUND: HCPCS

## 2021-01-04 NOTE — FLOWSHEET NOTE
[facility] - Outpatient Rehabilitation & Therapy  [address]. [place]  Phone: [phone]   Fax:     [phone]      Physical Therapy Treatment Note/ Progress Report:     Date:  2021    Patient Name:  Alexus Canela    :  1960  MRN: [id number]    Pertinent Medical History:Additional Pertinent Hx: HTN, Spinal Surgery, Left TKR  Medical/Treatment Diagnosis Information:  · Diagnosis: A25.718 (ICD-10-CM) - Status post right knee replacement  · Treatment Diagnosis: Pain. Decreased R knee ROM and strength  Insurance/Certification information:  PT Insurance Information: [address] [facility]  Physician Information:  Referring Practitioner: Dr. Britni Sanchez of care signed (Y/N):     Date of Patient follow up with Physician:      Progress Report: []  Yes  [x]  No     Date Range for reporting period:  Beginnin2021  Ending:      Progress report due (10 Rx/or 30 days whichever is less): 46     Recertification due (POC duration/ or 90 days whichever is less): 3/21/20     Visit # POC/Insurance Allowable Auth Needed   5 12 []Yes   []No     Latex Allergy:  [x]NO      []YES  Preferred Language for Healthcare:   [x]English       []Other:    Functional Scale:      Date assessed: at eval  Test: LEFS  Score:26 (60-80%)    Pain level:  5/10     History of Injury:   Pt states she had right TKR on 20. Did home PT for a few weeks at home. Using the [facility] and sometimes without it at home. 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 Goal is to get back to work as [employer] at [employer] and to doing normal ADLs. SUBJECTIVE:     20: Pt states she is having a good day today, was sore yesterday  20:Knee is moving better. States her ankle has been bothering her some.    20: Pt states she is feeling pretty good today, little better  20: Pt states she is doing good, was sore after last session. Not sure if it is bending better. OBJECTIVE:   Observation:    Test measurements:     12/23/20: 0-115 deg PROM R   12/29/20: 0-125 deg PROM   12/30/20: 0-130 deg PROM   1/4/20: 0-138.5 deg PROM    RESTRICTIONS/PRECAUTIONS: R TKR on 12/2/20    Exercises/Interventions:     Therapeutic Ex (56045)   Min: Reps/Resistance Notes/CUES   Bike X 5 min, level 3    Incline 3 x 30\"    HSS/HQS 3 x 30\"    Heel Slide X 20  Right    SAQ 2 x 20 R 1.5#    Leg Press 75# 2 x 20  45# 2 x 20 R    Steps 6 inch  Fwd and Side 2 x 10              Manual Intervention (65272)  Min:     Knee mobs/PROM R knee flexion and extension stretches with OP    Tib/Fem Mobs     Patella Mobs Right all directions    Ankle mobs               NMR re-education (89057)  Min:  CUES NEEDED             Therapeutic Activity (31864)  Min:     Gait training          Modalities  Min:     IFC  To left lower gastroc x 15 min    CP after exercises X 15 min to L knee    MH after exercises            Other Therapeutic Activities: Pt was educated on PT POC, Diagnosis, Prognosis, pathomechanics as well as frequency and duration of scheduling future physical therapy appointments. Time was also taken on this day to answer all patient questions and participation in PT. Reviewed appointment policy in detail with patient and patient verbalized understanding. Home Exercise Program: Instructed patient on an HEP. Patient demonstrated exercises correctly. Handout with pictures and # of reps/sets was given. Exercises are: Seated, Supine, and standing knee flexion and extension stretch 3 x per day. HS and Gastroc stretch along with other HEP to do 2x per day. Given Handout      Therapeutic Exercise and NMR EXR  [x] (98344) Provided verbal/tactile cueing for activities related to strengthening, flexibility, endurance, ROM for improvements in LE, proximal hip, and core control with self care, mobility, lifting, ambulation.   [x] Treatment Minutes: 77      [] EVAL (LOW) 06087 (typically 20 minutes face-to-face)  [] EVAL (MOD) 38805 (typically 30 minutes face-to-face)  [] EVAL (HIGH) 88522 (typically 45 minutes face-to-face)  [] RE-EVAL     [x] FRANCISCO(50803) x 3    [] Dry needle 1 or 2 Muscles (87445)  [] NMR (91169) x     [] Dry needle 3+ Muscles (52701)  [x] Manual (87539) x     [] Ultrasound (19686) x  [] TA (42242) x     [] Mech Traction (38524)  [x] ES(attended) (08604)     [] ES (un) (90176):   [] Vasopump (78159) [] Ionto (73425)   [] Other:    Maliha Myers stated goal: less pain, better sleep, normal activity  []? Progressing: []? Met: []? Not Met: []? Adjusted     Therapist goals for Patient:   Short Term Goals: To be achieved in: 2 weeks  1. Independent in HEP and progression per patient tolerance, in order to prevent re-injury. []? Progressing: []? Met: []? Not Met: []? Adjusted  2. Patient will have a decrease in pain to facilitate improvement in movement, function, and ADLs as indicated by Functional Deficits. []? Progressing: []? Met: []? Not Met: []? Adjusted     Long Term Goals: To be achieved in: 6 weeks  1. Disability index score of 10% or less for the LEFS to assist with reaching prior level of function. []? Progressing: []? Met: []? Not Met: []? Adjusted  2. Patient will demonstrate increased AROM to 0-140 to allow for proper joint functioning as indicated by patients Functional Deficits. []? Progressing: []? Met: []? Not Met: []? Adjusted  3. Patient will demonstrate an increase in Strength to good proximal hip strength and control, within 5lb HHD in LE to allow for proper functional mobility as indicated by patients Functional Deficits. []? Progressing: []? Met: []? Not Met: []? Adjusted  4. Patient will return to normal functional activities without increased symptoms or restriction. []? Progressing: []? Met: []? Not Met: []? Adjusted  5.  Pt will sleep through the night and be able to return to work (patient specific functional goal)    []? Progressing: []? Met: []? Not Met: []? Adjusted                 ASSESSMENT:  See eval    Treatment/Activity Tolerance:  [x] Patient tolerated treatment well [] Patient limited by fatique  [] Patient limited by pain  [] Patient limited by other medical complications  [] Other:     Overall Progression Towards Functional goals/ Treatment Progress Update:  [] Patient is progressing as expected towards functional goals listed. [] Progression is slowed due to complexities/Impairments listed. [] Progression has been slowed due to co-morbidities. [x] Plan just implemented, too soon to assess goals progression <30days   [] Goals require adjustment due to lack of progress  [] Patient is not progressing as expected and requires additional follow up with physician  [] Other    Prognosis for POC: [x] Good [] Fair  [] Poor    Patient requires continued skilled intervention: [x] Yes  [] No        PLAN:   Manual to improve ROM  Progress strengthening as able  Gait  CP  *Assess left shoulder and give HEP when able    [x] Continue per plan of care [] Alter current plan (see comments)  [] Plan of care initiated [] Hold pending MD visit [] Discharge    Electronically signed by: Barbara Quach PT    Note: If patient does not return for scheduled/recommended follow up visits, this note will serve as a discharge from care along with the most recent update on progress.

## 2021-01-06 ENCOUNTER — HOSPITAL ENCOUNTER (OUTPATIENT)
Dept: PHYSICAL THERAPY | Age: 61
Setting detail: THERAPIES SERIES
Discharge: HOME | End: 2021-01-06
Payer: COMMERCIAL

## 2021-01-06 ENCOUNTER — APPOINTMENT (OUTPATIENT)
Dept: PHYSICAL THERAPY | Age: 61
End: 2021-01-06
Payer: COMMERCIAL

## 2021-01-06 DIAGNOSIS — M75.82 ROTATOR CUFF TENDONITIS, LEFT: Primary | ICD-10-CM

## 2021-01-06 PROCEDURE — 97110 THERAPEUTIC EXERCISES: CPT

## 2021-01-06 PROCEDURE — 97140 MANUAL THERAPY 1/> REGIONS: CPT

## 2021-01-06 PROCEDURE — 97530 THERAPEUTIC ACTIVITIES: CPT

## 2021-01-06 NOTE — FLOWSHEET NOTE
[facility] - Outpatient Rehabilitation & Therapy  [address]. [place] [address]  Phone: [phone]   Fax:     [phone]      Physical Therapy Treatment Note/ Progress Report:     Date:  2021    Patient Name:  Patricia Rosales    :  1960  MRN: [id number]    Pertinent Medical History:Additional Pertinent Hx: HTN, Spinal Surgery, Left TKR  Medical/Treatment Diagnosis Information:  · Diagnosis: G67.025 (ICD-10-CM) - Status post right knee replacement, Left Shoulder Pain  · Treatment Diagnosis: Pain. Decreased R knee ROM and strength  Insurance/Certification information:  PT Insurance Information: [address] [facility]  Physician Information:  Referring Practitioner: Dr. Ruel Michael of care signed (Y/N):     Date of Patient follow up with Physician:      Progress Report: []  Yes  [x]  No     Date Range for reporting period:  Beginnin2021  Ending:      Progress report due (10 Rx/or 30 days whichever is less):      Recertification due (POC duration/ or 90 days whichever is less): 3/21/20     Visit # POC/Insurance Allowable Auth Needed   5 12 []Yes   []No     Latex Allergy:  [x]NO      []YES  Preferred Language for Healthcare:   [x]English       []Other:    Functional Scale:      Date assessed: at eval  Test: LEFS  Score:26 (60-80%)    Pain level:  5/10     History of Injury:   Pt states she had right TKR on 20. Did home PT for a few weeks at home. Using the [facility] and sometimes without it at home. 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 Goal is to get back to work as [employer] at [employer] and to doing normal ADLs. 20: Left Shoulder Assessment: Pain is 3-8/10 in the left shoulder. Worse at night, wakes her up. Feels a lot of popping at night. States she had left RTC repair by Dr. Paulino Villalobos 4 years ago.  States she has had a few injections that helped some. States she is taking medications for her knee surgery which seems to have helped the pain. No noticeable weakness. Overhead reaching was bothering the shoulder. SUBJECTIVE:     12/23/20: Pt states she is having a good day today, was sore yesterday  12/29/20:Knee is moving better. States her ankle has been bothering her some. 12/30/20: Pt states she is feeling pretty good today, little better  1/4/20: Pt states she is doing good, was sore after last session. Not sure if it is bending better. OBJECTIVE:   Observation:    Test measurements:     12/23/20: 0-115 deg PROM R   12/29/20: 0-125 deg PROM   12/30/20: 0-130 deg PROM   1/4/20: 0-138.5 deg PROM   1/6/20: 0-140 deg PROM   1/6/20: Shoulder Evaluation:    Shoulder AROM: Right WNL    Left WNL Except Flexion 170  And PROM WNL except flexion 170   UE Strength:  Right WNL. Left WNL except shoulder flexion 4/5 and ER 4/5    RESTRICTIONS/PRECAUTIONS: R TKR on 12/2/20    Exercises/Interventions:     Therapeutic Ex (95722)   Min: Reps/Resistance Notes/CUES   Bike X 5 min,  level 3    Incline 3 x 30\"    HSS/HQS 3 x 30\"    Heel Slide X 20  Right    SAQ 2 x 20 R 2#    Leg Press 80# 2 x 20  45# 2 x 20 R    Steps 6 inch  Fwd and Side 2 x 10    Shoulder Exercises Tslide High Row and LPD  Dark Blue  ER/IR Dark Blue  Wall Slide and Table slide  Shoulder Scaption with 1# weight         Manual Intervention (88644)  Min:     Knee mobs/PROM R knee flexion and extension stretches with OP    Tib/Fem Mobs     Patella Mobs Right all directions    Ankle mobs               NMR re-education (70448)  Min:  CUES NEEDED             Therapeutic Activity (66567)  Min:     Gait training          Modalities  Min:     IFC     CP after exercises    MH after exercises            Other Therapeutic Activities: Pt was educated on PT POC, Diagnosis, Prognosis, pathomechanics as well as frequency and duration of scheduling future physical therapy appointments.  Time was also taken on this day to answer all patient questions and participation in PT. Reviewed appointment policy in detail with patient and patient verbalized understanding. Home Exercise Program: Instructed patient on an HEP. Patient demonstrated exercises correctly. Handout with pictures and # of reps/sets was given. Exercises are: Seated, Supine, and standing knee flexion and extension stretch 3 x per day. HS and Gastroc stretch along with other HEP to do 2x per day. Given Handout      Therapeutic Exercise and NMR EXR  [x] (61404) Provided verbal/tactile cueing for activities related to strengthening, flexibility, endurance, ROM for improvements in LE, proximal hip, and core control with self care, mobility, lifting, ambulation. [x] (11896) Provided verbal/tactile cueing for activities related to improving balance, coordination, kinesthetic sense, posture, motor skill, proprioception  to assist with LE, proximal hip, and core control in self care, mobility, lifting, ambulation and eccentric single leg control.      NMR and Therapeutic Activities:    [] (97325 or 88791) Provided verbal/tactile cueing for activities related to improving balance, coordination, kinesthetic sense, posture, motor skill, proprioception and motor activation to allow for proper function of core, proximal hip and LE with self care and ADLs and functional mobility.   [] (04574) Gait Re-education- Provided training and instruction to the patient for proper LE, core and proximal hip recruitment and positioning and eccentric body weight control with ambulation re-education including up and down stairs     Home Exercise Program:    [x] (39690) Reviewed/Progressed HEP activities related to strengthening, flexibility, endurance, ROM of core, proximal hip and LE for functional self-care, mobility, lifting and ambulation/stair navigation   [] (20297)Reviewed/Progressed HEP activities related to improving balance, coordination, kinesthetic sense, posture, motor skill, proprioception of core, proximal hip and LE for self care, mobility, lifting, and ambulation/stair navigation      Manual Treatments:  PROM / STM / Oscillations-Mobs:  G-I, II, III, IV (PA's, Inf., Post.)  [x] (49763) Provided manual therapy to mobilize LE, proximal hip and/or LS spine soft tissue/joints for the purpose of modulating pain, promoting relaxation,  increasing ROM, reducing/eliminating soft tissue swelling/inflammation/restriction, improving soft tissue extensibility and allowing for proper ROM for normal function with self care, mobility, lifting and ambulation. If BW Please Indicate Time In/Out  CPT Code Time in Time out                         Charges:  Timed Code Treatment Minutes: 88   Total Treatment Minutes: 92      [] EVAL (LOW) 38512 (typically 20 minutes face-to-face)  [] EVAL (MOD) 44729 (typically 30 minutes face-to-face)  [] EVAL (HIGH) 22122 (typically 45 minutes face-to-face)  [] RE-EVAL     [x] LE(70774) x 3    [] Dry needle 1 or 2 Muscles (17613)  [] NMR (89753) x     [] Dry needle 3+ Muscles (14563)  [x] Manual (09849) x     [] Ultrasound (13540) x  [x] TA (99606) x  2   [] Mech Traction (81335)  [] ES(attended) (11778)     [] ES (un) (64707):   [] Vasopump (38284) [] Ionto (61373)   [] Other:    Nolene Faster stated goal: less pain, better sleep, normal activity  []? Progressing: []? Met: []? Not Met: []? Adjusted     Therapist goals for Patient:   Short Term Goals: To be achieved in: 2 weeks  1. Independent in HEP and progression per patient tolerance, in order to prevent re-injury. []? Progressing: []? Met: []? Not Met: []? Adjusted  2. Patient will have a decrease in pain to facilitate improvement in movement, function, and ADLs as indicated by Functional Deficits. []? Progressing: []? Met: []? Not Met: []? Adjusted     Long Term Goals: To be achieved in: 6 weeks  1. Disability index score of 10% or less for the LEFS to assist with reaching prior level of function. []? Progressing: []? Met: []? Not Met: []? Adjusted  2. Patient will demonstrate increased AROM to 0-140 to allow for proper joint functioning as indicated by patients Functional Deficits. []? Progressing: []? Met: []? Not Met: []? Adjusted  3. Patient will demonstrate an increase in Strength to good proximal hip strength and control, within 5lb HHD in LE to allow for proper functional mobility as indicated by patients Functional Deficits. []? Progressing: []? Met: []? Not Met: []? Adjusted  4. Patient will return to normal functional activities without increased symptoms or restriction. []? Progressing: []? Met: []? Not Met: []? Adjusted  5. Pt will sleep through the night and be able to return to work (patient specific functional goal)    []? Progressing: []? Met: []? Not Met: []? Adjusted                 ASSESSMENT:  See eval    Treatment/Activity Tolerance:  [x] Patient tolerated treatment well [] Patient limited by fatique  [] Patient limited by pain  [] Patient limited by other medical complications  [] Other:     Overall Progression Towards Functional goals/ Treatment Progress Update:  [] Patient is progressing as expected towards functional goals listed. [] Progression is slowed due to complexities/Impairments listed. [] Progression has been slowed due to co-morbidities.   [x] Plan just implemented, too soon to assess goals progression <30days   [] Goals require adjustment due to lack of progress  [] Patient is not progressing as expected and requires additional follow up with physician  [] Other    Prognosis for POC: [x] Good [] Fair  [] Poor    Patient requires continued skilled intervention: [x] Yes  [] No        PLAN:   Manual to improve ROM  Progress strengthening as able  Gait  CP  *Assess left shoulder and give HEP when able    [x] Continue per plan of care [] Alter current plan (see comments)  [] Plan of care initiated [] Hold pending MD visit [] Discharge    Electronically signed by: Hernán Leonardo, PT    Note: If patient does not return for scheduled/recommended follow up visits, this note will serve as a discharge from care along with the most recent update on progress.

## 2021-01-07 ENCOUNTER — OFFICE VISIT (OUTPATIENT)
Dept: ORTHOPEDIC SURGERY | Age: 61
End: 2021-01-07

## 2021-01-07 VITALS — TEMPERATURE: 97.2 F

## 2021-01-07 DIAGNOSIS — Z96.651 STATUS POST RIGHT KNEE REPLACEMENT: Primary | ICD-10-CM

## 2021-01-07 PROCEDURE — 99024 POSTOP FOLLOW-UP VISIT: CPT | Performed by: ORTHOPAEDIC SURGERY

## 2021-01-07 NOTE — PROGRESS NOTES
This dictation was done with blinkbox music dictation and may contain mechanical errors related to translation. Temperature 97.2 °F (36.2 °C), temperature source Temporal, not currently breastfeeding.

## 2021-01-09 NOTE — PROGRESS NOTES
Patient is a 65-year-old female now about 1 month postop a right uncemented robotic assisted total knee arthroplasty date of surgery 12/2/2020. Doing reasonably well however she keeps comparing it to her a year plus old arthroplasty on her contralateral left side and states that it does not feel as good is that one does. Clearly the reason is obvious. Otherwise she has no problems and has improved range of motion. Physical examination 65-year-old female oriented x3 temperature is 97.2. Examination of the right knee shows a stable healing anterior wound. Range of motion full extension 125 degrees of flexion are noted with no signs of instability or deep sepsis. Distal neurovascular examinations intact to the right foot and ankle. X-rays in the past have shown stable uncemented knee arthroplasty. Impression 65-year-old female stable status post right uncemented total knee arthroplasty. Plan continue physical therapy follow-up in 4 to 6 weeks for repeat evaluation with x-rays at that time.

## 2021-01-11 ENCOUNTER — HOSPITAL ENCOUNTER (OUTPATIENT)
Dept: PHYSICAL THERAPY | Age: 61
Setting detail: THERAPIES SERIES
Discharge: HOME | End: 2021-01-11
Payer: COMMERCIAL

## 2021-01-11 PROCEDURE — 97110 THERAPEUTIC EXERCISES: CPT

## 2021-01-11 PROCEDURE — 97140 MANUAL THERAPY 1/> REGIONS: CPT

## 2021-01-11 NOTE — FLOWSHEET NOTE
[facility] - Outpatient Rehabilitation & Therapy  [address]. [place]  Phone: [phone]   Fax:     [phone]      Physical Therapy Treatment Note/ Progress Report:     Date:  2021    Patient Name:  Junior Victor    :  1960  MRN: [id number]    Pertinent Medical History:Additional Pertinent Hx: HTN, Spinal Surgery, Left TKR  Medical/Treatment Diagnosis Information:  · Diagnosis: Y21.538 (ICD-10-CM) - Status post right knee replacement, Left Shoulder Pain  · Treatment Diagnosis: Pain. Decreased R knee ROM and strength  Insurance/Certification information:  PT Insurance Information: [address] [facility]  Physician Information:  Referring Practitioner: Dr. Dejan De Souza of care signed (Y/N):     Date of Patient follow up with Physician:      Progress Report: []  Yes  [x]  No     Date Range for reporting period:  Beginnin2021  Ending:      Progress report due (10 Rx/or 30 days whichever is less):      Recertification due (POC duration/ or 90 days whichever is less): 3/21/20     Visit # POC/Insurance Allowable Auth Needed   6 12 []Yes   []No     Latex Allergy:  [x]NO      []YES  Preferred Language for Healthcare:   [x]English       []Other:    Functional Scale:      Date assessed: at eval  Test: LEFS  Score:26 (60-80%)    Pain level:  4/10     History of Injury:   Pt states she had right TKR on 20. Did home PT for a few weeks at home. Using the [facility] and sometimes without it at home. 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 Goal is to get back to work as [employer] at [employer] and to doing normal ADLs. 20: Left Shoulder Assessment: Pain is 3-8/10 in the left shoulder. Worse at night, wakes her up. Feels a lot of popping at night. States she had left RTC repair by Dr. Anastasia Mcgee 4 years ago.  States she has had a few injections that helped some. States she is taking medications for her knee surgery which seems to have helped the pain. No noticeable weakness. Overhead reaching was bothering the shoulder. SUBJECTIVE:     12/23/20: Pt states she is having a good day today, was sore yesterday  12/29/20:Knee is moving better. States her ankle has been bothering her some. 12/30/20: Pt states she is feeling pretty good today, little better  1/4/20: Pt states she is doing good, was sore after last session. Not sure if it is bending better. 01/11/21: Patient reports she has been having some calf soreness after doing more walking. OBJECTIVE:   Observation:    Test measurements:     12/23/20: 0-115 deg PROM R   12/29/20: 0-125 deg PROM   12/30/20: 0-130 deg PROM   1/4/20: 0-138.5 deg PROM   1/6/20: 0-140 deg PROM   1/6/20: Shoulder Evaluation:    Shoulder AROM: Right WNL    Left WNL Except Flexion 170  And PROM WNL except flexion 170   UE Strength:  Right WNL.  Left WNL except shoulder flexion 4/5 and ER 4/5    RESTRICTIONS/PRECAUTIONS: R TKR on 12/2/20    Exercises/Interventions:     Therapeutic Ex (23326)   Min: Reps/Resistance Notes/CUES   Bike X 5 min,  level 3    Incline 3 x 30\"    HSS/HQS 3 x 30\"    Heel Slide X 20  Right    SAQ 2 x 20 R 2#    Leg Press 80# 2 x 20  45# 2 x 20 R    Steps 6 inch  Fwd and Side 2 x 10    Shoulder Exercises Tslide High Row and LPD  Dark Blue  ER/IR Dark Blue  Wall Slide and   Shoulder Scaption with 1# weight         Manual Intervention (21401)  Min:     Knee mobs/PROM R knee flexion and extension stretches with OP    Tib/Fem Mobs     Patella Mobs Right all directions    Ankle mobs               NMR re-education (83115)  Min:  CUES NEEDED             Therapeutic Activity (65634)  Min:     Gait training          Modalities  Min:     IFC     CP after exercises    MH after exercises            Other Therapeutic Activities: Pt was educated on PT POC, Diagnosis, Prognosis, pathomechanics as well as frequency and duration of scheduling future physical therapy appointments. Time was also taken on this day to answer all patient questions and participation in PT. Reviewed appointment policy in detail with patient and patient verbalized understanding. Home Exercise Program: Instructed patient on an HEP. Patient demonstrated exercises correctly. Handout with pictures and # of reps/sets was given. Exercises are: Seated, Supine, and standing knee flexion and extension stretch 3 x per day. HS and Gastroc stretch along with other HEP to do 2x per day. Given Handout      Therapeutic Exercise and NMR EXR  [x] (64233) Provided verbal/tactile cueing for activities related to strengthening, flexibility, endurance, ROM for improvements in LE, proximal hip, and core control with self care, mobility, lifting, ambulation. [x] (67345) Provided verbal/tactile cueing for activities related to improving balance, coordination, kinesthetic sense, posture, motor skill, proprioception  to assist with LE, proximal hip, and core control in self care, mobility, lifting, ambulation and eccentric single leg control.      NMR and Therapeutic Activities:    [] (49221 or 83388) Provided verbal/tactile cueing for activities related to improving balance, coordination, kinesthetic sense, posture, motor skill, proprioception and motor activation to allow for proper function of core, proximal hip and LE with self care and ADLs and functional mobility.   [] (09713) Gait Re-education- Provided training and instruction to the patient for proper LE, core and proximal hip recruitment and positioning and eccentric body weight control with ambulation re-education including up and down stairs     Home Exercise Program:    [x] (81508) Reviewed/Progressed HEP activities related to strengthening, flexibility, endurance, ROM of core, proximal hip and LE for functional self-care, mobility, lifting and ambulation/stair navigation   [] (44775)Reviewed/Progressed HEP activities related to improving balance, coordination, kinesthetic sense, posture, motor skill, proprioception of core, proximal hip and LE for self care, mobility, lifting, and ambulation/stair navigation      Manual Treatments:  PROM / STM / Oscillations-Mobs:  G-I, II, III, IV (PA's, Inf., Post.)  [x] (78722) Provided manual therapy to mobilize LE, proximal hip and/or LS spine soft tissue/joints for the purpose of modulating pain, promoting relaxation,  increasing ROM, reducing/eliminating soft tissue swelling/inflammation/restriction, improving soft tissue extensibility and allowing for proper ROM for normal function with self care, mobility, lifting and ambulation. If BWC Please Indicate Time In/Out  CPT Code Time in Time out                         Charges:  Timed Code Treatment Minutes: 60   Total Treatment Minutes: 65      [] EVAL (LOW) 27073 (typically 20 minutes face-to-face)  [] EVAL (MOD) 02724 (typically 30 minutes face-to-face)  [] EVAL (HIGH) 03776 (typically 45 minutes face-to-face)  [] RE-EVAL     [x] YD(74147) x 3    [] Dry needle 1 or 2 Muscles (94654)  [] NMR (58077) x     [] Dry needle 3+ Muscles (13181)  [x] Manual (64104) x  1   [] Ultrasound (47336) x  [] TA (82805) x     [] Mech Traction (45139)  [] ES(attended) (32582)     [] ES (un) (99977):   [] Vasopump (82412) [] Ionto (89114)   [] Other:    Autumn Timmons stated goal: less pain, better sleep, normal activity  []? Progressing: []? Met: []? Not Met: []? Adjusted     Therapist goals for Patient:   Short Term Goals: To be achieved in: 2 weeks  1. Independent in HEP and progression per patient tolerance, in order to prevent re-injury. []? Progressing: []? Met: []? Not Met: []? Adjusted  2. Patient will have a decrease in pain to facilitate improvement in movement, function, and ADLs as indicated by Functional Deficits. []? Progressing: []? Met: []? Not Met: []? Adjusted     Long Term Goals: To be achieved in: 6 weeks  1.  Disability index score of 10% or less for the LEFS to assist with reaching prior level of function. []? Progressing: []? Met: []? Not Met: []? Adjusted  2. Patient will demonstrate increased AROM to 0-140 to allow for proper joint functioning as indicated by patients Functional Deficits. []? Progressing: []? Met: []? Not Met: []? Adjusted  3. Patient will demonstrate an increase in Strength to good proximal hip strength and control, within 5lb HHD in LE to allow for proper functional mobility as indicated by patients Functional Deficits. []? Progressing: []? Met: []? Not Met: []? Adjusted  4. Patient will return to normal functional activities without increased symptoms or restriction. []? Progressing: []? Met: []? Not Met: []? Adjusted  5. Pt will sleep through the night and be able to return to work (patient specific functional goal)    []? Progressing: []? Met: []? Not Met: []? Adjusted                 ASSESSMENT:  See eval    Treatment/Activity Tolerance:  [x] Patient tolerated treatment well [] Patient limited by fatique  [] Patient limited by pain  [] Patient limited by other medical complications  [] Other:     Overall Progression Towards Functional goals/ Treatment Progress Update:  [] Patient is progressing as expected towards functional goals listed. [] Progression is slowed due to complexities/Impairments listed. [] Progression has been slowed due to co-morbidities.   [x] Plan just implemented, too soon to assess goals progression <30days   [] Goals require adjustment due to lack of progress  [] Patient is not progressing as expected and requires additional follow up with physician  [] Other    Prognosis for POC: [x] Good [] Fair  [] Poor    Patient requires continued skilled intervention: [x] Yes  [] No        PLAN:   Manual to improve ROM  Progress strengthening as able  Gait  CP  *Assess left shoulder and give HEP when able    [x] Continue per plan of care [] Alter current plan (see comments)  [] Plan of care initiated [] Hold pending MD visit [] Discharge    Electronically signed by: Nimisha Mariano PTA    Note: If patient does not return for scheduled/recommended follow up visits, this note will serve as a discharge from care along with the most recent update on progress.

## 2021-01-13 ENCOUNTER — HOSPITAL ENCOUNTER (OUTPATIENT)
Dept: PHYSICAL THERAPY | Age: 61
Setting detail: THERAPIES SERIES
Discharge: HOME | End: 2021-01-13
Payer: COMMERCIAL

## 2021-01-13 PROCEDURE — 97110 THERAPEUTIC EXERCISES: CPT

## 2021-01-13 NOTE — FLOWSHEET NOTE
[facility] - Outpatient Rehabilitation & Therapy  [address] [facility]. [place]  Phone: [phone]   Fax:     [phone]      Physical Therapy Treatment Note/ Progress Report:     Date:  2021    Patient Name:  Forest Mcbride    :  1960  MRN: [id number]    Pertinent Medical History:Additional Pertinent Hx: HTN, Spinal Surgery, Left TKR  Medical/Treatment Diagnosis Information:  · Diagnosis: O36.580 (ICD-10-CM) - Status post right knee replacement, Left Shoulder Pain  · Treatment Diagnosis: Pain. Decreased R knee ROM and strength  Insurance/Certification information:  PT Insurance Information: [address] [facility]  Physician Information:  Referring Practitioner: Dr. Lazaro Lowry of care signed (Y/N):     Date of Patient follow up with Physician:      Progress Report: []  Yes  [x]  No     Date Range for reporting period:  Beginnin2021  Ending:      Progress report due (10 Rx/or 30 days whichever is less): 04     Recertification due (POC duration/ or 90 days whichever is less): 3/21/20     Visit # POC/Insurance Allowable Auth Needed   7 12 []Yes   []No     Latex Allergy:  [x]NO      []YES  Preferred Language for Healthcare:   [x]English       []Other:    Functional Scale:      Date assessed: at eval  Test: LEFS  Score:26 (60-80%)    Pain level:  4/10     History of Injury:   Pt states she had right TKR on 20. Did home PT for a few weeks at home. Using the [employer] and sometimes without it at home. 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 Goal is to get back to work as [employer] at [employer] and to doing normal ADLs. 20: Left Shoulder Assessment: Pain is 3-8/10 in the left shoulder. Worse at night, wakes her up. Feels a lot of popping at night. States she had left RTC repair by Dr. Dolores Yeager 4 years ago.  States she has had a few injections that helped LE for functional self-care, mobility, lifting and ambulation/stair navigation   [] (88825)Reviewed/Progressed HEP activities related to improving balance, coordination, kinesthetic sense, posture, motor skill, proprioception of core, proximal hip and LE for self care, mobility, lifting, and ambulation/stair navigation      Manual Treatments:  PROM / STM / Oscillations-Mobs:  G-I, II, III, IV (PA's, Inf., Post.)  [x] (40485) Provided manual therapy to mobilize LE, proximal hip and/or LS spine soft tissue/joints for the purpose of modulating pain, promoting relaxation,  increasing ROM, reducing/eliminating soft tissue swelling/inflammation/restriction, improving soft tissue extensibility and allowing for proper ROM for normal function with self care, mobility, lifting and ambulation. If BW Please Indicate Time In/Out  CPT Code Time in Time out                         Charges:  Timed Code Treatment Minutes: 46   Total Treatment Minutes: 55      [] EVAL (LOW) 28143 (typically 20 minutes face-to-face)  [] EVAL (MOD) 03948 (typically 30 minutes face-to-face)  [] EVAL (HIGH) 78603 (typically 45 minutes face-to-face)  [] RE-EVAL     [x] RO(06841) x 3    [] Dry needle 1 or 2 Muscles (10054)  [] NMR (98142) x     [] Dry needle 3+ Muscles (77525)  [] Manual (02153) x     [] Ultrasound (18169) x  [] TA (83777) x     [] Mech Traction (89700)  [] ES(attended) (27280)     [] ES (un) (93807):   [] Vasopump (38947) [] Ionto (25549)   [] Other:    Tresia Come stated goal: less pain, better sleep, normal activity  []? Progressing: []? Met: []? Not Met: []? Adjusted     Therapist goals for Patient:   Short Term Goals: To be achieved in: 2 weeks  1. Independent in HEP and progression per patient tolerance, in order to prevent re-injury. []? Progressing: []? Met: []? Not Met: []? Adjusted  2. Patient will have a decrease in pain to facilitate improvement in movement, function, and ADLs as indicated by Functional Deficits.   []? Progressing: []? Met: []? Not Met: []? Adjusted     Long Term Goals: To be achieved in: 6 weeks  1. Disability index score of 10% or less for the LEFS to assist with reaching prior level of function. []? Progressing: []? Met: []? Not Met: []? Adjusted  2. Patient will demonstrate increased AROM to 0-140 to allow for proper joint functioning as indicated by patients Functional Deficits. []? Progressing: []? Met: []? Not Met: []? Adjusted  3. Patient will demonstrate an increase in Strength to good proximal hip strength and control, within 5lb HHD in LE to allow for proper functional mobility as indicated by patients Functional Deficits. []? Progressing: []? Met: []? Not Met: []? Adjusted  4. Patient will return to normal functional activities without increased symptoms or restriction. []? Progressing: []? Met: []? Not Met: []? Adjusted  5. Pt will sleep through the night and be able to return to work (patient specific functional goal)    []? Progressing: []? Met: []? Not Met: []? Adjusted                 ASSESSMENT:  See eval    Treatment/Activity Tolerance:  [x] Patient tolerated treatment well [] Patient limited by fatique  [] Patient limited by pain  [] Patient limited by other medical complications  [] Other:     Overall Progression Towards Functional goals/ Treatment Progress Update:  [] Patient is progressing as expected towards functional goals listed. [] Progression is slowed due to complexities/Impairments listed. [] Progression has been slowed due to co-morbidities.   [x] Plan just implemented, too soon to assess goals progression <30days   [] Goals require adjustment due to lack of progress  [] Patient is not progressing as expected and requires additional follow up with physician  [] Other    Prognosis for POC: [x] Good [] Fair  [] Poor    Patient requires continued skilled intervention: [x] Yes  [] No        PLAN:   Manual to improve ROM  Progress strengthening as able  Gait  CP  *Assess left shoulder and give HEP when able    [x] Continue per plan of care [] Alter current plan (see comments)  [] Plan of care initiated [] Hold pending MD visit [] Discharge    Electronically signed by: Maria C Hung PT    Note: If patient does not return for scheduled/recommended follow up visits, this note will serve as a discharge from care along with the most recent update on progress.

## 2021-01-18 ENCOUNTER — HOSPITAL ENCOUNTER (OUTPATIENT)
Dept: PHYSICAL THERAPY | Age: 61
Setting detail: THERAPIES SERIES
Discharge: HOME | End: 2021-01-18
Payer: COMMERCIAL

## 2021-01-18 PROCEDURE — 97110 THERAPEUTIC EXERCISES: CPT

## 2021-01-18 NOTE — FLOWSHEET NOTE
[facility] - Outpatient Rehabilitation & Therapy  [address] [facility] [place]  Phone: [phone]   Fax:     [phone]      Physical Therapy Treatment Note/ Progress Report:     Date:  2021    Patient Name:  Deann Dorsey    :  1960  MRN: [id number]    Pertinent Medical History:Additional Pertinent Hx: HTN, Spinal Surgery, Left TKR  Medical/Treatment Diagnosis Information:  · Diagnosis: X13.053 (ICD-10-CM) - Status post right knee replacement, Left Shoulder Pain  · Treatment Diagnosis: Pain. Decreased R knee ROM and strength  Insurance/Certification information:  PT Insurance Information: [address] [facility]  Physician Information:  Referring Practitioner: Dr. Isabel Samples of care signed (Y/N):     Date of Patient follow up with Physician:      Progress Report: []  Yes  [x]  No     Date Range for reporting period:  Beginnin2021  Ending:      Progress report due (10 Rx/or 30 days whichever is less): 83     Recertification due (POC duration/ or 90 days whichever is less): 3/21/20     Visit # POC/Insurance Allowable Auth Needed   8 12 []Yes   []No     Latex Allergy:  [x]NO      []YES  Preferred Language for Healthcare:   [x]English       []Other:    Functional Scale:      Date assessed: at eval  Test: LEFS  Score:26 (60-80%)    Pain level:  3-4/10     History of Injury:   Pt states she had right TKR on 20. Did home PT for a few weeks at home. Using the [facility] and sometimes without it at home. 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 Goal is to get back to work as [employer] at [employer] and to doing normal ADLs. 20: Left Shoulder Assessment: Pain is 3-8/10 in the left shoulder. Worse at night, wakes her up. Feels a lot of popping at night. States she had left RTC repair by Dr. lAlyn Rocha 4 years ago.  States she has had a few injections that helped some. States she is taking medications for her knee surgery which seems to have helped the pain. No noticeable weakness. Overhead reaching was bothering the shoulder. SUBJECTIVE:     12/23/20: Pt states she is having a good day today, was sore yesterday  12/29/20:Knee is moving better. States her ankle has been bothering her some. 12/30/20: Pt states she is feeling pretty good today, little better  1/4/20: Pt states she is doing good, was sore after last session. Not sure if it is bending better. 01/11/21: Patient reports she has been having some calf soreness after doing more walking. 1/13/21: Doing good, shoulder is getting better. Hardest thing for her is the steps. 01/18/21: Patient reports reports she still getting some knee soreness after prolonged walking. States shoulder does not wake her up at night. OBJECTIVE:   Observation:    Test measurements:     12/23/20: 0-115 deg PROM R   12/29/20: 0-125 deg PROM   12/30/20: 0-130 deg PROM   1/4/20: 0-138.5 deg PROM   1/6/20: 0-140 deg PROM   1/6/20: Shoulder Evaluation:    Shoulder AROM: Right WNL    Left WNL Except Flexion 170  And PROM WNL except flexion 170   UE Strength:  Right WNL.  Left WNL except shoulder flexion 4/5 and ER 4/5   1/13/21: 0-140 Deg PROM Knee    RESTRICTIONS/PRECAUTIONS: R TKR on 12/2/20    Exercises/Interventions:     Therapeutic Ex (98389)   Min: Reps/Resistance Notes/CUES   Bike X 5 min,  level 7    Incline 3 x 30\"    HSS/HQS 3 x 30\"    Heel Slide X 20  Right    LAQ 2 x 20 R 3#    Leg Press 85# 2 x 20  45# 2 x 20 R    Steps 6 inch  Fwd and Side 2 x 10    Shoulder Exercises Tslide High Row and LPD  Dark Blue  ER/IR Dark Blue  Wall Slide and   Shoulder Scaption with 1# weight         Manual Intervention (03780)  Min:     Knee mobs/PROM R knee flexion and extension stretches with OP    Tib/Fem Mobs     Patella Mobs Right all directions    Ankle mobs               NMR re-education (21164)  Min:  CUES NEEDED Therapeutic Activity (64633)  Min:     Gait training          Modalities  Min:     IFC     CP after exercises    MH after exercises            Other Therapeutic Activities: Pt was educated on PT POC, Diagnosis, Prognosis, pathomechanics as well as frequency and duration of scheduling future physical therapy appointments. Time was also taken on this day to answer all patient questions and participation in PT. Reviewed appointment policy in detail with patient and patient verbalized understanding. Home Exercise Program: Instructed patient on an HEP. Patient demonstrated exercises correctly. Handout with pictures and # of reps/sets was given. Exercises are: Seated, Supine, and standing knee flexion and extension stretch 3 x per day. HS and Gastroc stretch along with other HEP to do 2x per day. Given Handout      Therapeutic Exercise and NMR EXR  [x] (80221) Provided verbal/tactile cueing for activities related to strengthening, flexibility, endurance, ROM for improvements in LE, proximal hip, and core control with self care, mobility, lifting, ambulation. [x] (40082) Provided verbal/tactile cueing for activities related to improving balance, coordination, kinesthetic sense, posture, motor skill, proprioception  to assist with LE, proximal hip, and core control in self care, mobility, lifting, ambulation and eccentric single leg control.      NMR and Therapeutic Activities:    [] (59756 or 57766) Provided verbal/tactile cueing for activities related to improving balance, coordination, kinesthetic sense, posture, motor skill, proprioception and motor activation to allow for proper function of core, proximal hip and LE with self care and ADLs and functional mobility.   [] (74190) Gait Re-education- Provided training and instruction to the patient for proper LE, core and proximal hip recruitment and positioning and eccentric body weight control with ambulation re-education including up and down stairs     Home Exercise Program:    [x] (20256) Reviewed/Progressed HEP activities related to strengthening, flexibility, endurance, ROM of core, proximal hip and LE for functional self-care, mobility, lifting and ambulation/stair navigation   [] (80349)Reviewed/Progressed HEP activities related to improving balance, coordination, kinesthetic sense, posture, motor skill, proprioception of core, proximal hip and LE for self care, mobility, lifting, and ambulation/stair navigation      Manual Treatments:  PROM / STM / Oscillations-Mobs:  G-I, II, III, IV (PA's, Inf., Post.)  [x] (91536) Provided manual therapy to mobilize LE, proximal hip and/or LS spine soft tissue/joints for the purpose of modulating pain, promoting relaxation,  increasing ROM, reducing/eliminating soft tissue swelling/inflammation/restriction, improving soft tissue extensibility and allowing for proper ROM for normal function with self care, mobility, lifting and ambulation. If [facility] Please Indicate Time In/Out  CPT Code Time in Time out                         Charges:  Timed Code Treatment Minutes: 46   Total Treatment Minutes: 55      [] EVAL (LOW) 74234 (typically 20 minutes face-to-face)  [] EVAL (MOD) 39324 (typically 30 minutes face-to-face)  [] EVAL (HIGH) 56570 (typically 45 minutes face-to-face)  [] RE-EVAL     [x] TZ(26403) x 3    [] Dry needle 1 or 2 Muscles (87336)  [] NMR (95263) x     [] Dry needle 3+ Muscles (95031)  [] Manual (49887) x     [] Ultrasound (98759) x  [] TA (71824) x     [] Mech Traction (88205)  [] ES(attended) (72776)     [] ES (un) (77551):   [] Vasopump (25805) [] Ionto (45243)   [] Other:    Tresia Come stated goal: less pain, better sleep, normal activity  []? Progressing: []? Met: []? Not Met: []? Adjusted     Therapist goals for Patient:   Short Term Goals: To be achieved in: 2 weeks  1. Independent in HEP and progression per patient tolerance, in order to prevent re-injury. []? Progressing: []? Met: []?  Not Met: []? Adjusted  2. Patient will have a decrease in pain to facilitate improvement in movement, function, and ADLs as indicated by Functional Deficits. []? Progressing: []? Met: []? Not Met: []? Adjusted     Long Term Goals: To be achieved in: 6 weeks  1. Disability index score of 10% or less for the LEFS to assist with reaching prior level of function. []? Progressing: []? Met: []? Not Met: []? Adjusted  2. Patient will demonstrate increased AROM to 0-140 to allow for proper joint functioning as indicated by patients Functional Deficits. []? Progressing: []? Met: []? Not Met: []? Adjusted  3. Patient will demonstrate an increase in Strength to good proximal hip strength and control, within 5lb HHD in LE to allow for proper functional mobility as indicated by patients Functional Deficits. []? Progressing: []? Met: []? Not Met: []? Adjusted  4. Patient will return to normal functional activities without increased symptoms or restriction. []? Progressing: []? Met: []? Not Met: []? Adjusted  5. Pt will sleep through the night and be able to return to work (patient specific functional goal)    []? Progressing: []? Met: []? Not Met: []? Adjusted                 ASSESSMENT:  See eval    Treatment/Activity Tolerance:  [x] Patient tolerated treatment well [] Patient limited by fatique  [] Patient limited by pain  [] Patient limited by other medical complications  [] Other:     Overall Progression Towards Functional goals/ Treatment Progress Update:  [] Patient is progressing as expected towards functional goals listed. [] Progression is slowed due to complexities/Impairments listed. [] Progression has been slowed due to co-morbidities.   [x] Plan just implemented, too soon to assess goals progression <30days   [] Goals require adjustment due to lack of progress  [] Patient is not progressing as expected and requires additional follow up with physician  [] Other    Prognosis for POC: [x] Good [] Fair  [] Poor    Patient requires continued skilled intervention: [x] Yes  [] No        PLAN:   Manual to improve ROM  Progress strengthening as able  Gait  CP  *Assess left shoulder and give HEP when able    [x] Continue per plan of care [] Alter current plan (see comments)  [] Plan of care initiated [] Hold pending MD visit [] Discharge    Electronically signed by: Trey Casanova PTA    Note: If patient does not return for scheduled/recommended follow up visits, this note will serve as a discharge from care along with the most recent update on progress.

## 2021-01-20 ENCOUNTER — HOSPITAL ENCOUNTER (OUTPATIENT)
Dept: PHYSICAL THERAPY | Age: 61
Setting detail: THERAPIES SERIES
Discharge: HOME | End: 2021-01-20
Payer: COMMERCIAL

## 2021-01-20 PROCEDURE — 97110 THERAPEUTIC EXERCISES: CPT

## 2021-01-20 NOTE — FLOWSHEET NOTE
[facility] - Outpatient Rehabilitation & Therapy  [address] [facility]. [place]  Phone: [phone]   Fax:     [phone]      Physical Therapy Treatment Note/ Progress Report:     Date:  2021    Patient Name:  Medhat Queen    :  1960  MRN: [id number]    Pertinent Medical History:Additional Pertinent Hx: HTN, Spinal Surgery, Left TKR  Medical/Treatment Diagnosis Information:  · Diagnosis: Z29.688 (ICD-10-CM) - Status post right knee replacement, Left Shoulder Pain  · Treatment Diagnosis: Pain. Decreased R knee ROM and strength  Insurance/Certification information:  PT Insurance Information: [address] [facility]  Physician Information:  Referring Practitioner: Dr. Gaudencio Pedroza of care signed (Y/N):     Date of Patient follow up with Physician:      Progress Report: []  Yes  [x]  No     Date Range for reporting period:  Beginnin2021  Ending:      Progress report due (10 Rx/or 30 days whichever is less): 3/98/89     Recertification due (POC duration/ or 90 days whichever is less): 3/21/20     Visit # POC/Insurance Allowable Auth Needed   9 12 []Yes   []No     Latex Allergy:  [x]NO      []YES  Preferred Language for Healthcare:   [x]English       []Other:    Functional Scale:      Date assessed: at eval  Test: LEFS  Score:26 (60-80%)    Pain level:  3-4/10     History of Injury:   Pt states she had right TKR on 20. Did home PT for a few weeks at home. Using the [address] and sometimes without it at home. 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 Goal is to get back to work as [employer] at [employer] and to doing normal ADLs. 20: Left Shoulder Assessment: Pain is 3-8/10 in the left shoulder. Worse at night, wakes her up. Feels a lot of popping at night. States she had left RTC repair by Dr. Lilian Ferguson 4 years ago.  States she has had a few injections that helped some. States she is taking medications for her knee surgery which seems to have helped the pain. No noticeable weakness. Overhead reaching was bothering the shoulder. SUBJECTIVE:     12/23/20: Pt states she is having a good day today, was sore yesterday  12/29/20:Knee is moving better. States her ankle has been bothering her some. 12/30/20: Pt states she is feeling pretty good today, little better  1/4/20: Pt states she is doing good, was sore after last session. Not sure if it is bending better. 01/11/21: Patient reports she has been having some calf soreness after doing more walking. 1/13/21: Doing good, shoulder is getting better. Hardest thing for her is the steps. 01/18/21: Patient reports reports she still getting some knee soreness after prolonged walking. States shoulder does not wake her up at night.  1/20/21: Feeling good today    OBJECTIVE:   Observation:    Test measurements:     12/23/20: 0-115 deg PROM R   12/29/20: 0-125 deg PROM   12/30/20: 0-130 deg PROM   1/4/20: 0-138.5 deg PROM   1/6/20: 0-140 deg PROM   1/6/20: Shoulder Evaluation:    Shoulder AROM: Right WNL    Left WNL Except Flexion 170  And PROM WNL except flexion 170   UE Strength:  Right WNL.  Left WNL except shoulder flexion 4/5 and ER 4/5   1/13/21: 0-140 Deg PROM Knee   1/20/21: R knee extension 3+/5    RESTRICTIONS/PRECAUTIONS: R TKR on 12/2/20    Exercises/Interventions:     Therapeutic Ex (00561)   Min: Reps/Resistance Notes/CUES   Bike X 5 min,  level 7    Incline 3 x 30\"    HSS/HQS 3 x 30\"    Heel Slide X 20  Right    LAQ 2 x 20 R 4#    Leg Press 95# 2 x 20  50# 2 x 20 R    Steps 6 inch  Fwd and Side 2 x 10 R    Shoulder Exercises Tslide High Row and LPD   Blue  ER/IR Dark Blue  Wall Slide and   Shoulder Scaption with 1# weight         Manual Intervention (19290)  Min:     Knee mobs/PROM R knee flexion and extension stretches with OP    Tib/Fem Mobs     Patella Mobs Right all directions    Ankle mobs re-education including up and down stairs     Home Exercise Program:    [x] (70361) Reviewed/Progressed HEP activities related to strengthening, flexibility, endurance, ROM of core, proximal hip and LE for functional self-care, mobility, lifting and ambulation/stair navigation   [] (48450)Reviewed/Progressed HEP activities related to improving balance, coordination, kinesthetic sense, posture, motor skill, proprioception of core, proximal hip and LE for self care, mobility, lifting, and ambulation/stair navigation      Manual Treatments:  PROM / STM / Oscillations-Mobs:  G-I, II, III, IV (PA's, Inf., Post.)  [x] (39798) Provided manual therapy to mobilize LE, proximal hip and/or LS spine soft tissue/joints for the purpose of modulating pain, promoting relaxation,  increasing ROM, reducing/eliminating soft tissue swelling/inflammation/restriction, improving soft tissue extensibility and allowing for proper ROM for normal function with self care, mobility, lifting and ambulation. If [facility] Please Indicate Time In/Out  CPT Code Time in Time out                         Charges:  Timed Code Treatment Minutes: 60   Total Treatment Minutes: 60      [] EVAL (LOW) 64550 (typically 20 minutes face-to-face)  [] EVAL (MOD) 35688 (typically 30 minutes face-to-face)  [] EVAL (HIGH) 81794 (typically 45 minutes face-to-face)  [] RE-EVAL     [x] CP(65286) x 4    [] Dry needle 1 or 2 Muscles (27062)  [] NMR (73182) x     [] Dry needle 3+ Muscles (52652)  [] Manual (80984) x     [] Ultrasound (36126) x  [] TA (16034) x     [] Mech Traction (22217)  [] ES(attended) (59848)     [] ES (un) (34408):   [] Vasopump (00839) [] Ionto (78177)   [] Other:    Rusty Ledbetter stated goal: less pain, better sleep, normal activity  []? Progressing: []? Met: []? Not Met: []? Adjusted     Therapist goals for Patient:   Short Term Goals: To be achieved in: 2 weeks  1.  Independent in HEP and progression per patient tolerance, in order to prevent Other    Prognosis for POC: [x] Good [] Fair  [] Poor    Patient requires continued skilled intervention: [x] Yes  [] No        PLAN:   Manual to improve ROM  Progress strengthening as able  Gait  CP  *Assess left shoulder and give HEP when able    [x] Continue per plan of care [] Alter current plan (see comments)  [] Plan of care initiated [] Hold pending MD visit [] Discharge    Electronically signed by: Claudene Lucks, PT    Note: If patient does not return for scheduled/recommended follow up visits, this note will serve as a discharge from care along with the most recent update on progress.

## 2021-01-25 ENCOUNTER — APPOINTMENT (OUTPATIENT)
Dept: PHYSICAL THERAPY | Age: 61
End: 2021-01-25
Payer: COMMERCIAL

## 2021-01-25 ENCOUNTER — OFFICE VISIT (OUTPATIENT)
Dept: FAMILY MEDICINE CLINIC | Age: 61
End: 2021-01-25
Payer: COMMERCIAL

## 2021-01-25 VITALS
BODY MASS INDEX: 25.21 KG/M2 | DIASTOLIC BLOOD PRESSURE: 72 MMHG | TEMPERATURE: 96.8 F | SYSTOLIC BLOOD PRESSURE: 120 MMHG | WEIGHT: 137 LBS | HEIGHT: 62 IN | OXYGEN SATURATION: 98 % | HEART RATE: 72 BPM

## 2021-01-25 DIAGNOSIS — F34.1 DYSTHYMIA: Primary | ICD-10-CM

## 2021-01-25 PROCEDURE — 99213 OFFICE O/P EST LOW 20 MIN: CPT | Performed by: FAMILY MEDICINE

## 2021-01-25 RX ORDER — BUPROPION HYDROCHLORIDE 150 MG/1
150 TABLET ORAL EVERY MORNING
Qty: 90 TABLET | Refills: 1 | Status: SHIPPED | OUTPATIENT
Start: 2021-01-25 | End: 2021-06-16

## 2021-01-25 NOTE — PROGRESS NOTES
[facility] [place].  [address]  Phone: [phone]   Fax:     [phone]    Physical Therapy  Cancellation/No-show Note  Patient Name:  Rakel Duran  :  1960   Date:  2021  Cancelled visits to date: 1  No-shows to date: 0    Patient status for today's appointment patient:  [x]  Cancelled  []  Rescheduled appointment  []  No-show     Reason given by patient:  []  Patient ill  []  Conflicting appointment  []  No transportation    []  Conflict with work  []  No reason given  [x]  Other:     Comments:      Phone call information:   []  Phone call made today to patient at _ time at number provided:      []  Patient answered, conversation as follows:    []  Patient did not answer, message left as follows:  [x]  Phone call not made today    Electronically signed by:  Diallo Hamlin, PT

## 2021-01-25 NOTE — PATIENT INSTRUCTIONS
Low Carb Eating with Intermittent Fasting    target < 100 grams of carb daily; ZERO grained based carbs  Get only carbs from whole fruits - strawberries, raspberries, blackberries, apples, oranges, pineapples, bananas etc.    Intermittent Fasting / Eating Window  Only eat between noon and 8 PM  Water any time  Coffee with cream and no more than 1 teaspoon sugar OK in AM    1) Wheat Belly by Tobias Rosales MD (www.[employer]. [employer])  2) The Primal Blueprint by Nalini Reno ([email or web address].[email or web address]. [employer] - review success stories)  2) Living Paleo For Dummies

## 2021-01-27 ENCOUNTER — HOSPITAL ENCOUNTER (OUTPATIENT)
Dept: PHYSICAL THERAPY | Age: 61
Setting detail: THERAPIES SERIES
Discharge: HOME | End: 2021-01-27
Payer: COMMERCIAL

## 2021-01-27 PROCEDURE — 97110 THERAPEUTIC EXERCISES: CPT

## 2021-01-27 PROCEDURE — 97140 MANUAL THERAPY 1/> REGIONS: CPT

## 2021-01-27 NOTE — PROGRESS NOTES
[facility] - Outpatient Rehabilitation & Therapy  [address] [facility] [place] [place]. [address] [place] [address]  Phone: [phone]   Fax:     [phone]      Physical Therapy Treatment Note/ Progress Report:     Date:  2021    Patient Name:  Mahi Tobias    :  1960  MRN: [id number]    Pertinent Medical History:Additional Pertinent Hx: HTN, Spinal Surgery, Left TKR  Medical/Treatment Diagnosis Information:  · Diagnosis: N40.440 (ICD-10-CM) - Status post right knee replacement, Left Shoulder Pain  · Treatment Diagnosis: Pain. Decreased R knee ROM and strength  Insurance/Certification information:  PT Insurance Information: [address] [facility]  Physician Information:  Referring Practitioner: Dr. Alba Castellano of care signed (Y/N):     Date of Patient follow up with Physician:      Progress Report: []  Yes  [x]  No     Date Range for reporting period:  Beginnin2021  Ending:      Progress report due (10 Rx/or 30 days whichever is less):      Recertification due (POC duration/ or 90 days whichever is less): 3/21/20     Visit # POC/Insurance Allowable Auth Needed   10 12 []Yes   []No     Latex Allergy:  [x]NO      []YES  Preferred Language for Healthcare:   [x]English       []Other:    Functional Scale:      Date assessed: at eval  Test: LEFS  Score:26 (60-80%)    Pain level:  2/10     History of Injury:   Pt states she had right TKR on 20. Did home PT for a few weeks at home. Using the [facility] and sometimes without it at home. 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 Goal is to get back to work as [employer] at [employer] and to doing normal ADLs. 20: Left Shoulder Assessment: Pain is 3-8/10 in the left shoulder. Worse at night, wakes her up. Feels a lot of popping at night. States she had left RTC repair by Dr. Rock Barajas 4 years ago.  States she has had a few injections that helped some. States she is taking medications for her knee surgery which seems to have helped the pain. No noticeable weakness. Overhead reaching was bothering the shoulder. SUBJECTIVE:     12/23/20: Pt states she is having a good day today, was sore yesterday  12/29/20:Knee is moving better. States her ankle has been bothering her some. 12/30/20: Pt states she is feeling pretty good today, little better  1/4/20: Pt states she is doing good, was sore after last session. Not sure if it is bending better. 01/11/21: Patient reports she has been having some calf soreness after doing more walking. 1/13/21: Doing good, shoulder is getting better. Hardest thing for her is the steps. 01/18/21: Patient reports reports she still getting some knee soreness after prolonged walking. States shoulder does not wake her up at night.  1/20/21: Feeling good today  1/27/21: Knee is doing good    OBJECTIVE:   Observation:    Test measurements:     12/23/20: 0-115 deg PROM R   12/29/20: 0-125 deg PROM   12/30/20: 0-130 deg PROM   1/4/20: 0-138.5 deg PROM   1/6/20: 0-140 deg PROM   1/6/20: Shoulder Evaluation:    Shoulder AROM: Right WNL    Left WNL Except Flexion 170  And PROM WNL except flexion 170   UE Strength:  Right WNL.  Left WNL except shoulder flexion 4/5 and ER 4/5   1/13/21: 0-140 Deg PROM Knee   1/20/21: R knee extension 3+/5   1/27/21: R knee extension and flexion 4-/5                RESTRICTIONS/PRECAUTIONS: R TKR on 12/2/20    Exercises/Interventions:     Therapeutic Ex (16909)   Min: Reps/Resistance Notes/CUES   Bike X 5 min,  level 7    Incline 3 x 30\"    HSS/HQS 3 x 30\"    Heel Slide X 20  Right    LAQ  HS Curl 2 x 20 R 5#  2 x 20 R 2.5 kg    Leg Press 95# 2 x 20  50# 2 x 20 R    Steps 6 inch  Fwd and Side 2 x 10 R    Shoulder Exercises Tslide High Row and LPD   Blue  ER/IR Dark Blue  Wall Slide and   Shoulder Scaption with 1# weight         Manual Intervention (72061)  Min:     Knee mobs/PROM R knee Goals: To be achieved in: 2 weeks  1. Independent in HEP and progression per patient tolerance, in order to prevent re-injury. []? Progressing: []? Met: []? Not Met: []? Adjusted  2. Patient will have a decrease in pain to facilitate improvement in movement, function, and ADLs as indicated by Functional Deficits. []? Progressing: []? Met: []? Not Met: []? Adjusted     Long Term Goals: To be achieved in: 6 weeks  1. Disability index score of 10% or less for the LEFS to assist with reaching prior level of function. []? Progressing: []? Met: []? Not Met: []? Adjusted  2. Patient will demonstrate increased AROM to 0-140 to allow for proper joint functioning as indicated by patients Functional Deficits. []? Progressing: []? Met: []? Not Met: []? Adjusted  3. Patient will demonstrate an increase in Strength to good proximal hip strength and control, within 5lb HHD in LE to allow for proper functional mobility as indicated by patients Functional Deficits. []? Progressing: []? Met: []? Not Met: []? Adjusted  4. Patient will return to normal functional activities without increased symptoms or restriction. []? Progressing: []? Met: []? Not Met: []? Adjusted  5. Pt will sleep through the night and be able to return to work (patient specific functional goal)    []? Progressing: []? Met: []? Not Met: []? Adjusted                 ASSESSMENT:  See eval    Treatment/Activity Tolerance:  [x] Patient tolerated treatment well [] Patient limited by fatique  [] Patient limited by pain  [] Patient limited by other medical complications  [] Other:     Overall Progression Towards Functional goals/ Treatment Progress Update:  [] Patient is progressing as expected towards functional goals listed. [] Progression is slowed due to complexities/Impairments listed. [] Progression has been slowed due to co-morbidities.   [x] Plan just implemented, too soon to assess goals progression <30days   [] Goals require adjustment due to lack

## 2021-01-28 ENCOUNTER — HOSPITAL ENCOUNTER (OUTPATIENT)
Dept: PHYSICAL THERAPY | Age: 61
Setting detail: THERAPIES SERIES
Discharge: HOME | End: 2021-01-28
Payer: COMMERCIAL

## 2021-01-28 PROCEDURE — 97140 MANUAL THERAPY 1/> REGIONS: CPT

## 2021-01-28 PROCEDURE — 97110 THERAPEUTIC EXERCISES: CPT

## 2021-01-28 NOTE — FLOWSHEET NOTE
[facility] - Outpatient Rehabilitation & Therapy  [address] [facility]. [address]  Phone: [phone]   Fax:     [phone]      Physical Therapy Treatment Note/ Progress Report:     Date:  2021    Patient Name:  Junior Victor    :  1960  MRN: [id number]    Pertinent Medical History:Additional Pertinent Hx: HTN, Spinal Surgery, Left TKR  Medical/Treatment Diagnosis Information:  · Diagnosis: C06.769 (ICD-10-CM) - Status post right knee replacement, Left Shoulder Pain  · Treatment Diagnosis: Pain. Decreased R knee ROM and strength  Insurance/Certification information:  PT Insurance Information: [address] [facility]  Physician Information:  Referring Practitioner: Dr. Dejan De Souza of care signed (Y/N):     Date of Patient follow up with Physician:      Progress Report: []  Yes  [x]  No     Date Range for reporting period:  Beginnin2021  Ending:      Progress report due (10 Rx/or 30 days whichever is less):      Recertification due (POC duration/ or 90 days whichever is less): 3/21/20     Visit # POC/Insurance Allowable Auth Needed   11 12 []Yes   []No     Latex Allergy:  [x]NO      []YES  Preferred Language for Healthcare:   [x]English       []Other:    Functional Scale:      Date assessed: at eval  Test: LEFS  Score:26 (60-80%)    Pain level:  2/10     History of Injury:   Pt states she had right TKR on 20. Did home PT for a few weeks at home. Using the [facility] and sometimes without it at home. 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 Goal is to get back to work as [employer] and to doing normal ADLs. 20: Left Shoulder Assessment: Pain is 3-8/10 in the left shoulder. Worse at night, wakes her up. Feels a lot of popping at night. States she had left RTC repair by Dr. Anastasia Mcgee 4 years ago.  States she has had a few injections that helped weight         Manual Intervention (43825)  Min:     Knee mobs/PROM R knee flexion and extension stretches with OP    Tib/Fem Mobs     Patella Mobs Right all directions    Ankle mobs               NMR re-education (00885)  Min:  CUES NEEDED             Therapeutic Activity (39298)  Min:     Gait training          Modalities  Min:     IFC     CP after exercises    MH after exercises            Other Therapeutic Activities: Pt was educated on PT POC, Diagnosis, Prognosis, pathomechanics as well as frequency and duration of scheduling future physical therapy appointments. Time was also taken on this day to answer all patient questions and participation in PT. Reviewed appointment policy in detail with patient and patient verbalized understanding. Home Exercise Program: Instructed patient on an HEP. Patient demonstrated exercises correctly. Handout with pictures and # of reps/sets was given. Exercises are: Seated, Supine, and standing knee flexion and extension stretch 3 x per day. HS and Gastroc stretch along with other HEP to do 2x per day. Given Handout      Therapeutic Exercise and NMR EXR  [x] (23749) Provided verbal/tactile cueing for activities related to strengthening, flexibility, endurance, ROM for improvements in LE, proximal hip, and core control with self care, mobility, lifting, ambulation. [x] (09127) Provided verbal/tactile cueing for activities related to improving balance, coordination, kinesthetic sense, posture, motor skill, proprioception  to assist with LE, proximal hip, and core control in self care, mobility, lifting, ambulation and eccentric single leg control. NMR and Therapeutic Activities:    [] (44329 or 41151) Provided verbal/tactile cueing for activities related to improving balance, coordination, kinesthetic sense, posture, motor skill, proprioception and motor activation to allow for proper function of core, proximal hip and LE with self care and ADLs and functional mobility. [] (77915) Gait Re-education- Provided training and instruction to the patient for proper LE, core and proximal hip recruitment and positioning and eccentric body weight control with ambulation re-education including up and down stairs     Home Exercise Program:    [x] (51474) Reviewed/Progressed HEP activities related to strengthening, flexibility, endurance, ROM of core, proximal hip and LE for functional self-care, mobility, lifting and ambulation/stair navigation   [] (60540)Reviewed/Progressed HEP activities related to improving balance, coordination, kinesthetic sense, posture, motor skill, proprioception of core, proximal hip and LE for self care, mobility, lifting, and ambulation/stair navigation      Manual Treatments:  PROM / STM / Oscillations-Mobs:  G-I, II, III, IV (PA's, Inf., Post.)  [x] (53386) Provided manual therapy to mobilize LE, proximal hip and/or LS spine soft tissue/joints for the purpose of modulating pain, promoting relaxation,  increasing ROM, reducing/eliminating soft tissue swelling/inflammation/restriction, improving soft tissue extensibility and allowing for proper ROM for normal function with self care, mobility, lifting and ambulation. If [facility] Please Indicate Time In/Out  CPT Code Time in Time out                         Charges:  Timed Code Treatment Minutes: 45   Total Treatment Minutes: 60      [] EVAL (LOW) 86119 (typically 20 minutes face-to-face)  [] EVAL (MOD) 49716 (typically 30 minutes face-to-face)  [] EVAL (HIGH) 19534 (typically 45 minutes face-to-face)  [] RE-EVAL     [x] YS(10659) x 2    [] Dry needle 1 or 2 Muscles (38740)  [] NMR (28353) x     [] Dry needle 3+ Muscles (71385)  [x] Manual (06478) x     [] Ultrasound (37097) x  [] TA (86317) x     [] Mech Traction (25349)  [] ES(attended) (63773)     [] ES (un) (41531):   [] Vasopump (87970) [] Ionto (29678)   [] Other:    Monique Pat stated goal: less pain, better sleep, normal activity  []?  Progressing: []? Met: []? Not Met: []? Adjusted     Therapist goals for Patient:   Short Term Goals: To be achieved in: 2 weeks  1. Independent in HEP and progression per patient tolerance, in order to prevent re-injury. []? Progressing: []? Met: []? Not Met: []? Adjusted  2. Patient will have a decrease in pain to facilitate improvement in movement, function, and ADLs as indicated by Functional Deficits. []? Progressing: []? Met: []? Not Met: []? Adjusted     Long Term Goals: To be achieved in: 6 weeks  1. Disability index score of 10% or less for the LEFS to assist with reaching prior level of function. []? Progressing: []? Met: []? Not Met: []? Adjusted  2. Patient will demonstrate increased AROM to 0-140 to allow for proper joint functioning as indicated by patients Functional Deficits. []? Progressing: []? Met: []? Not Met: []? Adjusted  3. Patient will demonstrate an increase in Strength to good proximal hip strength and control, within 5lb HHD in LE to allow for proper functional mobility as indicated by patients Functional Deficits. []? Progressing: []? Met: []? Not Met: []? Adjusted  4. Patient will return to normal functional activities without increased symptoms or restriction. []? Progressing: []? Met: []? Not Met: []? Adjusted  5. Pt will sleep through the night and be able to return to work (patient specific functional goal)    []? Progressing: []? Met: []? Not Met: []? Adjusted                 ASSESSMENT:  See eval    Treatment/Activity Tolerance:  [x] Patient tolerated treatment well [] Patient limited by fatique  [] Patient limited by pain  [] Patient limited by other medical complications  [] Other:     Overall Progression Towards Functional goals/ Treatment Progress Update:  [] Patient is progressing as expected towards functional goals listed. [] Progression is slowed due to complexities/Impairments listed. [] Progression has been slowed due to co-morbidities.   [x] Plan just implemented, too soon to assess goals progression <30days   [] Goals require adjustment due to lack of progress  [] Patient is not progressing as expected and requires additional follow up with physician  [] Other    Prognosis for POC: [x] Good [] Fair  [] Poor    Patient requires continued skilled intervention: [x] Yes  [] No        PLAN:   Manual to improve ROM  Progress strengthening as able  Gait  CP  *Assess left shoulder and give HEP when able    [x] Continue per plan of care [] Alter current plan (see comments)  [] Plan of care initiated [] Hold pending MD visit [] Discharge    Electronically signed by: Chris Tinsley, PT    Note: If patient does not return for scheduled/recommended follow up visits, this note will serve as a discharge from care along with the most recent update on progress.

## 2021-02-01 ENCOUNTER — HOSPITAL ENCOUNTER (OUTPATIENT)
Dept: PHYSICAL THERAPY | Age: 61
Setting detail: THERAPIES SERIES
Discharge: HOME | End: 2021-02-01
Payer: COMMERCIAL

## 2021-02-01 PROCEDURE — 97110 THERAPEUTIC EXERCISES: CPT

## 2021-02-01 PROCEDURE — 97140 MANUAL THERAPY 1/> REGIONS: CPT

## 2021-02-01 NOTE — PLAN OF CARE
[address] [facility]  Physician Information:  Referring Practitioner: Dr. Lidya Hernandez of care signed (Y/N):     Date of Patient follow up with Physician:      Progress Report: []  Yes  [x]  No     Date Range for reporting period:  Beginnin2021  Ending:      Progress report due (10 Rx/or 30 days whichever is less):      Recertification due (POC duration/ or 90 days whichever is less): 3/21/20     Visit # POC/Insurance Allowable Auth Needed   12 12 []Yes   []No     Latex Allergy:  [x]NO      []YES  Preferred Language for Healthcare:   [x]English       []Other:    Functional Scale:      Date assessed: at [facility]  Test: LEFS  Score:26 (60-80%)    Pain level:  210     History of Injury:   Pt states she had right TKR on 20. Did home PT for a few weeks at home. Using the [facility] and sometimes without it at home. 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 Goal is to get back to work as [employer] and to doing normal ADLs. 20: Left Shoulder Assessment: Pain is 3-8/10 in the left shoulder. Worse at night, wakes her up. Feels a lot of popping at night. States she had left RTC repair by Dr. Onelia Bland 4 years ago. States she has had a few injections that helped some. States she is taking medications for her knee surgery which seems to have helped the pain. No noticeable weakness. Overhead reaching was bothering the shoulder. SUBJECTIVE:     20: Pt states she is having a good day today, was sore yesterday  20:Knee is moving better. States her ankle has been bothering her some. 20: Pt states she is feeling pretty good today, little better  20: Pt states she is doing good, was sore after last session. Not sure if it is bending better. 21: Patient reports she has been having some calf soreness after doing more walking. 21: Doing good, shoulder is getting better. Hardest thing for her is the steps.    21: Patient reports reports she still getting some knee soreness after prolonged walking. States shoulder does not wake her up at night.  1/20/21: Feeling good today  1/27/21: Knee is doing good  1/28/21: Knee is doing well, little sore in the HS from the new exercise  2/1/21: States she is doing good. Still has some tightness in her lower leg    OBJECTIVE:   Observation:    Test measurements:     12/23/20: 0-115 deg PROM R   12/29/20: 0-125 deg PROM   12/30/20: 0-130 deg PROM   1/4/20: 0-138.5 deg PROM   1/6/20: 0-140 deg PROM   1/6/20: Shoulder Evaluation:    Shoulder AROM: Right WNL    Left WNL Except Flexion 170  And PROM WNL except flexion 170   UE Strength:  Right WNL. Left WNL except shoulder flexion 4/5 and ER 4/5   1/13/21: 0-140 Deg PROM Knee   1/20/21: R knee extension 3+/5   1/27/21: R knee extension and flexion 4-/5    2/1/21: R knee extension 4/5 and flexion 4-/5               RESTRICTIONS/PRECAUTIONS: R TKR on 12/2/20    Exercises/Interventions:     Therapeutic Ex (63422)   Min: Reps/Resistance Notes/CUES   Bike X 5 min,  level 7    Incline 3 x 30\"    HSS/HQS 3 x 30\"    Heel Slide X 20  Right    LAQ  HS Curl 2 x 20 R 5#  2 x 20 R 2.5 kg    Leg Press 95# 2 x 20  50# 2 x 20 R    Steps 6 inch  Fwd and Side 2 x 10 R    Shoulder Exercises Tslide High Row and LPD   Blue  ER/IR Dark Blue  Wall Slide and   Shoulder Scaption with 1# weight         Manual Intervention (15884)  Min:     Knee mobs/PROM R knee flexion and extension stretches with OP    Tib/Fem Mobs     Patella Mobs Right all directions    Ankle mobs               NMR re-education (96838)  Min:  CUES NEEDED             Therapeutic Activity (51495)  Min:     Gait training          Modalities  Min:     IFC     CP after exercises    MH after exercises            Other Therapeutic Activities: Pt was educated on PT POC, Diagnosis, Prognosis, pathomechanics as well as frequency and duration of scheduling future physical therapy appointments. Time was also taken on this day to answer all patient questions and participation in PT. Reviewed appointment policy in detail with patient and patient verbalized understanding. Home Exercise Program: Instructed patient on an HEP. Patient demonstrated exercises correctly. Handout with pictures and # of reps/sets was given. Exercises are: Seated, Supine, and standing knee flexion and extension stretch 3 x per day. HS and Gastroc stretch along with other HEP to do 2x per day. Given Handout      Therapeutic Exercise and NMR EXR  [x] (88658) Provided verbal/tactile cueing for activities related to strengthening, flexibility, endurance, ROM for improvements in LE, proximal hip, and core control with self care, mobility, lifting, ambulation. [x] (59833) Provided verbal/tactile cueing for activities related to improving balance, coordination, kinesthetic sense, posture, motor skill, proprioception  to assist with LE, proximal hip, and core control in self care, mobility, lifting, ambulation and eccentric single leg control.      NMR and Therapeutic Activities:    [] (04304 or 86322) Provided verbal/tactile cueing for activities related to improving balance, coordination, kinesthetic sense, posture, motor skill, proprioception and motor activation to allow for proper function of core, proximal hip and LE with self care and ADLs and functional mobility.   [] (50658) Gait Re-education- Provided training and instruction to the patient for proper LE, core and proximal hip recruitment and positioning and eccentric body weight control with ambulation re-education including up and down stairs     Home Exercise Program:    [x] (30377) Reviewed/Progressed HEP activities related to strengthening, flexibility, endurance, ROM of core, proximal hip and LE for functional self-care, mobility, lifting and ambulation/stair navigation   [] (77540)Reviewed/Progressed HEP activities related to improving balance, coordination, kinesthetic sense, posture, motor skill, proprioception of core, proximal hip and LE for self care, mobility, lifting, and ambulation/stair navigation      Manual Treatments:  PROM / STM / Oscillations-Mobs:  G-I, II, III, IV (PA's, Inf., Post.)  [x] (87785) Provided manual therapy to mobilize LE, proximal hip and/or LS spine soft tissue/joints for the purpose of modulating pain, promoting relaxation,  increasing ROM, reducing/eliminating soft tissue swelling/inflammation/restriction, improving soft tissue extensibility and allowing for proper ROM for normal function with self care, mobility, lifting and ambulation. If [facility] Please Indicate Time In/Out  CPT Code Time in Time out                         Charges:  Timed Code Treatment Minutes: 45   Total Treatment Minutes: 60      [] EVAL (LOW) 07062 (typically 20 minutes face-to-face)  [] EVAL (MOD) 52682 (typically 30 minutes face-to-face)  [] EVAL (HIGH) 99723 (typically 45 minutes face-to-face)  [] RE-EVAL     [x] LL(95766) x 2    [] Dry needle 1 or 2 Muscles (72330)  [] NMR (93563) x     [] Dry needle 3+ Muscles (88605)  [x] Manual (16890) x     [] Ultrasound (91104) x  [] TA (98722) x     [] Mech Traction (04971)  [] ES(attended) (69356)     [] ES (un) (50456):   [] Vasopump (84845) [] Ionto (38023)   [] Other:    Pitts Mode stated goal: less pain, better sleep, normal activity  []? Progressing: []? Met: []? Not Met: []? Adjusted     Therapist goals for Patient:   Short Term Goals: To be achieved in: 2 weeks  1. Independent in HEP and progression per patient tolerance, in order to prevent re-injury. []? Progressing: []? Met: []? Not Met: []? Adjusted  2. Patient will have a decrease in pain to facilitate improvement in movement, function, and ADLs as indicated by Functional Deficits. []? Progressing: []? Met: []? Not Met: []? Adjusted     Long Term Goals: To be achieved in: 6 weeks  1.  Disability index score of 10% or less for the LEFS to assist with reaching prior level of function. []? Progressing: []? Met: []? Not Met: []? Adjusted  2. Patient will demonstrate increased AROM to 0-140 to allow for proper joint functioning as indicated by patients Functional Deficits. []? Progressing: []? Met: []? Not Met: []? Adjusted  3. Patient will demonstrate an increase in Strength to good proximal hip strength and control, within 5lb HHD in LE to allow for proper functional mobility as indicated by patients Functional Deficits. []? Progressing: []? Met: []? Not Met: []? Adjusted  4. Patient will return to normal functional activities without increased symptoms or restriction. []? Progressing: []? Met: []? Not Met: []? Adjusted  5. Pt will sleep through the night and be able to return to work (patient specific functional goal)    []? Progressing: []? Met: []? Not Met: []? Adjusted                 ASSESSMENT:  See eval    Treatment/Activity Tolerance:  [x] Patient tolerated treatment well [] Patient limited by fatique  [] Patient limited by pain  [] Patient limited by other medical complications  [] Other:     Overall Progression Towards Functional goals/ Treatment Progress Update:  [] Patient is progressing as expected towards functional goals listed. [] Progression is slowed due to complexities/Impairments listed. [] Progression has been slowed due to co-morbidities.   [x] Plan just implemented, too soon to assess goals progression <30days   [] Goals require adjustment due to lack of progress  [] Patient is not progressing as expected and requires additional follow up with physician  [] Other    Prognosis for POC: [x] Good [] Fair  [] Poor    Patient requires continued skilled intervention: [x] Yes  [] No        PLAN:   Manual to improve ROM  Progress strengthening as able  Gait  CP  *Assess left shoulder and give HEP when able    [x] Continue per plan of care [] Alter current plan (see comments)  [] Plan of care initiated [] Hold pending MD visit []

## 2021-02-03 ENCOUNTER — HOSPITAL ENCOUNTER (OUTPATIENT)
Dept: PHYSICAL THERAPY | Age: 61
Setting detail: THERAPIES SERIES
Discharge: HOME | End: 2021-02-03
Payer: COMMERCIAL

## 2021-02-03 PROCEDURE — 97140 MANUAL THERAPY 1/> REGIONS: CPT

## 2021-02-03 PROCEDURE — 97110 THERAPEUTIC EXERCISES: CPT

## 2021-02-03 NOTE — FLOWSHEET NOTE
helped some. States she is taking medications for her knee surgery which seems to have helped the pain. No noticeable weakness. Overhead reaching was bothering the shoulder. SUBJECTIVE:     01/11/21: Patient reports she has been having some calf soreness after doing more walking. 1/13/21: Doing good, shoulder is getting better. Hardest thing for her is the steps. 01/18/21: Patient reports reports she still getting some knee soreness after prolonged walking. States shoulder does not wake her up at night.  1/20/21: Feeling good today  1/27/21: Knee is doing good  1/28/21: Knee is doing well, little sore in the HS from the new exercise  2/1/21: States she is doing good. Still has some tightness in her lower leg  2/3/21: Pt states she is doing well    OBJECTIVE:   Observation:    Test measurements:     12/23/20: 0-115 deg PROM R   12/29/20: 0-125 deg PROM   12/30/20: 0-130 deg PROM   1/4/20: 0-138.5 deg PROM   1/6/20: 0-140 deg PROM   1/6/20: Shoulder Evaluation:    Shoulder AROM: Right WNL    Left WNL Except Flexion 170  And PROM WNL except flexion 170   UE Strength:  Right WNL.  Left WNL except shoulder flexion 4/5 and ER 4/5   1/13/21: 0-140 Deg PROM Knee   1/20/21: R knee extension 3+/5   1/27/21: R knee extension and flexion 4-/5    2/1/21: R knee extension 4/5 and flexion 4-/5               RESTRICTIONS/PRECAUTIONS: R TKR on 12/2/20    Exercises/Interventions:     Therapeutic Ex (90911)   Min: Reps/Resistance Notes/CUES   Bike X 5 min,  level 7    Incline 3 x 30\"    HSS/HQS 3 x 30\"    Heel Slide X 20  Right    LAQ  HS Curl 2 x 20 R 7#  2 x 20 R 4 kg    Leg Press 100# 2 x 20  55# 2 x 20 R    Steps 6 inch  Fwd and Side 2 x 10 R    Shoulder Exercises Tslide High Row and LPD   Blue  ER/IR Dark Blue  Wall Slide and   Shoulder Scaption with 1# weight         Manual Intervention (29747)  Min:     Knee mobs/PROM R knee flexion and extension stretches with OP    Tib/Fem Mobs     Patella Mobs Right all body weight control with ambulation re-education including up and down stairs     Home Exercise Program:    [x] (34941) Reviewed/Progressed HEP activities related to strengthening, flexibility, endurance, ROM of core, proximal hip and LE for functional self-care, mobility, lifting and ambulation/stair navigation   [] (74076)Reviewed/Progressed HEP activities related to improving balance, coordination, kinesthetic sense, posture, motor skill, proprioception of core, proximal hip and LE for self care, mobility, lifting, and ambulation/stair navigation      Manual Treatments:  PROM / STM / Oscillations-Mobs:  G-I, II, III, IV (PA's, Inf., Post.)  [x] (45661) Provided manual therapy to mobilize LE, proximal hip and/or LS spine soft tissue/joints for the purpose of modulating pain, promoting relaxation,  increasing ROM, reducing/eliminating soft tissue swelling/inflammation/restriction, improving soft tissue extensibility and allowing for proper ROM for normal function with self care, mobility, lifting and ambulation. If [facility] Please Indicate Time In/Out  CPT Code Time in Time out                         Charges:  Timed Code Treatment Minutes: 45   Total Treatment Minutes: 60      [] EVAL (LOW) 68275 (typically 20 minutes face-to-face)  [] EVAL (MOD) 68815 (typically 30 minutes face-to-face)  [] EVAL (HIGH) 63587 (typically 45 minutes face-to-face)  [] RE-EVAL     [x] ZH(47035) x 2    [] Dry needle 1 or 2 Muscles (13918)  [] NMR (25600) x     [] Dry needle 3+ Muscles (75515)  [x] Manual (20905) x     [] Ultrasound (75781) x  [] TA (37823) x     [] Mech Traction (02700)  [] ES(attended) (26749)     [] ES (un) (18384):   [] Vasopump (48361) [] Ionto (95010)   [] Other:    Riley Allen stated goal: less pain, better sleep, normal activity  []? Progressing: []? Met: []? Not Met: []? Adjusted     Therapist goals for Patient:   Short Term Goals: To be achieved in: 2 weeks  1.  Independent in HEP and progression per patient tolerance, in order to prevent re-injury. []? Progressing: []? Met: []? Not Met: []? Adjusted  2. Patient will have a decrease in pain to facilitate improvement in movement, function, and ADLs as indicated by Functional Deficits. []? Progressing: []? Met: []? Not Met: []? Adjusted     Long Term Goals: To be achieved in: 6 weeks  1. Disability index score of 10% or less for the LEFS to assist with reaching prior level of function. []? Progressing: []? Met: []? Not Met: []? Adjusted  2. Patient will demonstrate increased AROM to 0-140 to allow for proper joint functioning as indicated by patients Functional Deficits. []? Progressing: []? Met: []? Not Met: []? Adjusted  3. Patient will demonstrate an increase in Strength to good proximal hip strength and control, within 5lb HHD in LE to allow for proper functional mobility as indicated by patients Functional Deficits. []? Progressing: []? Met: []? Not Met: []? Adjusted  4. Patient will return to normal functional activities without increased symptoms or restriction. []? Progressing: []? Met: []? Not Met: []? Adjusted  5. Pt will sleep through the night and be able to return to work (patient specific functional goal)    []? Progressing: []? Met: []? Not Met: []? Adjusted                 ASSESSMENT:  See eval    Treatment/Activity Tolerance:  [x] Patient tolerated treatment well [] Patient limited by fatique  [] Patient limited by pain  [] Patient limited by other medical complications  [] Other:     Overall Progression Towards Functional goals/ Treatment Progress Update:  [] Patient is progressing as expected towards functional goals listed. [] Progression is slowed due to complexities/Impairments listed. [] Progression has been slowed due to co-morbidities.   [x] Plan just implemented, too soon to assess goals progression <30days   [] Goals require adjustment due to lack of progress  [] Patient is not progressing as expected and requires additional follow up with physician  [] Other    Prognosis for POC: [x] Good [] Fair  [] Poor    Patient requires continued skilled intervention: [x] Yes  [] No        PLAN:   Manual to improve ROM  Progress strengthening as able  Gait  CP  *Assess left shoulder and give HEP when able    [x] Continue per plan of care [] Alter current plan (see comments)  [] Plan of care initiated [] Hold pending MD visit [] Discharge    Electronically signed by: Barbara Quach, PT    Note: If patient does not return for scheduled/recommended follow up visits, this note will serve as a discharge from care along with the most recent update on progress.

## 2021-02-08 ENCOUNTER — HOSPITAL ENCOUNTER (OUTPATIENT)
Dept: PHYSICAL THERAPY | Age: 61
Setting detail: THERAPIES SERIES
Discharge: HOME | End: 2021-02-08
Payer: COMMERCIAL

## 2021-02-08 ENCOUNTER — OFFICE VISIT (OUTPATIENT)
Dept: ORTHOPEDIC SURGERY | Age: 61
End: 2021-02-08

## 2021-02-08 VITALS — TEMPERATURE: 98.1 F

## 2021-02-08 DIAGNOSIS — Z96.652 HISTORY OF TOTAL KNEE ARTHROPLASTY, LEFT: ICD-10-CM

## 2021-02-08 DIAGNOSIS — Z96.651 STATUS POST RIGHT KNEE REPLACEMENT: Primary | ICD-10-CM

## 2021-02-08 PROCEDURE — 97110 THERAPEUTIC EXERCISES: CPT

## 2021-02-08 PROCEDURE — 99024 POSTOP FOLLOW-UP VISIT: CPT | Performed by: ORTHOPAEDIC SURGERY

## 2021-02-08 NOTE — FLOWSHEET NOTE
[facility] - Outpatient Rehabilitation & Therapy  [address] [place]  Phone: [phone]   Fax:     [phone]        Physical Therapy Treatment Note/ Progress Report:      Date:  2021    Patient Name:  Swetha Appiah    :  1960  MRN: [id number]    Pertinent Medical History: Additional Pertinent Hx: HTN, Spinal Surgery, Left TKR  Medical/Treatment Diagnosis Information:  · Diagnosis: V49.469 (ICD-10-CM) - Status post right knee replacement, Left Shoulder Pain  · Treatment Diagnosis: Pain. Decreased R knee ROM and strength  Insurance/Certification information:  PT Insurance Information: [address] [facility]  Physician Information:  Referring Practitioner: Dr. Jefferson Jimenez of care signed (Y/N):     Date of Patient follow up with Physician:      Progress Report: []  Yes  [x]  No     Date Range for reporting period:  Beginnin2021  Ending:      Progress report due (10 Rx/or 30 days whichever is less):      Recertification due (POC duration/ or 90 days whichever is less): 3/21/20     Visit # POC/Insurance Allowable Auth Needed   14 18 []Yes   [x]No     Latex Allergy:  [x]NO      []YES  Preferred Language for Healthcare:   [x]English       []Other:    Functional Scale:      Date assessed: at eval  Test: LEFS  Score:26 (60-80%)    Pain level:  1-2/10     History of Injury:   Pt states she had right TKR on 20. Did home PT for a few weeks at home. Using the [facility] and sometimes without it at home. 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 Goal is to get back to work as [employer] at [employer] and to doing normal ADLs. 20: Left Shoulder Assessment: Pain is 3-8/10 in the left shoulder. Worse at night, wakes her up. Feels a lot of popping at night. States she had left RTC repair by Dr. Chong Ac 4 years ago.  States she has had a few injections that helped some. States she is taking medications for her knee surgery which seems to have helped the pain. No noticeable weakness. Overhead reaching was bothering the shoulder. SUBJECTIVE:     01/11/21: Patient reports she has been having some calf soreness after doing more walking. 1/13/21: Doing good, shoulder is getting better. Hardest thing for her is the steps. 01/18/21: Patient reports reports she still getting some knee soreness after prolonged walking. States shoulder does not wake her up at night.  1/20/21: Feeling good today  1/27/21: Knee is doing good  1/28/21: Knee is doing well, little sore in the HS from the new exercise  2/1/21: States she is doing good. Still has some tightness in her lower leg  2/3/21: Pt states she is doing well  2/8/21: Saw MD today and he wants her to work on strengthening more. OBJECTIVE:   Observation:    Test measurements:     12/23/20: 0-115 deg PROM R   12/29/20: 0-125 deg PROM   12/30/20: 0-130 deg PROM   1/4/20: 0-138.5 deg PROM   1/6/20: 0-140 deg PROM   1/6/20: Shoulder Evaluation:    Shoulder AROM: Right WNL    Left WNL Except Flexion 170  And PROM WNL except flexion 170   UE Strength:  Right WNL.  Left WNL except shoulder flexion 4/5 and ER 4/5   1/13/21: 0-140 Deg PROM Knee   1/20/21: R knee extension 3+/5   1/27/21: R knee extension and flexion 4-/5    2/1/21: R knee extension 4/5 and flexion 4-/5               RESTRICTIONS/PRECAUTIONS: R TKR on 12/2/20    Exercises/Interventions:     Therapeutic Ex (41449)   Min: Reps/Resistance Notes/CUES   Bike X 5 min,  level 7    Incline    HSS/HQS    Heel Slide    LAQ  HS Curl 2 x 20 R 7#  2 x 20 R 4 kg    Leg Press 100# 2 x 20  55# 2 x 20 R    Steps 6 inch  Fwd and Side 2 x 10 R    Shoulder Exercises Tslide High Row and LPD   Blue  ER/IR Dark Blue  Wall Slide and   Shoulder Scaption with 1# weight         Manual Intervention (96610)  Min:     Knee mobs/PROM R knee flexion and extension stretches with OP Tib/Fem Mobs     Patella Mobs Right all directions    Ankle mobs               NMR re-education (06447)  Min:  CUES NEEDED             Therapeutic Activity (32429)  Min:     Gait training          Modalities  Min:     IFC     CP after exercises    MH after exercises            Other Therapeutic Activities: Pt was educated on PT POC, Diagnosis, Prognosis, pathomechanics as well as frequency and duration of scheduling future physical therapy appointments. Time was also taken on this day to answer all patient questions and participation in PT. Reviewed appointment policy in detail with patient and patient verbalized understanding. Home Exercise Program: Instructed patient on an HEP. Patient demonstrated exercises correctly. Handout with pictures and # of reps/sets was given. Exercises are: Seated, Supine, and standing knee flexion and extension stretch 3 x per day. HS and Gastroc stretch along with other HEP to do 2x per day. Given Handout      Therapeutic Exercise and NMR EXR  [x] (84712) Provided verbal/tactile cueing for activities related to strengthening, flexibility, endurance, ROM for improvements in LE, proximal hip, and core control with self care, mobility, lifting, ambulation. [x] (53058) Provided verbal/tactile cueing for activities related to improving balance, coordination, kinesthetic sense, posture, motor skill, proprioception  to assist with LE, proximal hip, and core control in self care, mobility, lifting, ambulation and eccentric single leg control.      NMR and Therapeutic Activities:    [] (10171 or 82601) Provided verbal/tactile cueing for activities related to improving balance, coordination, kinesthetic sense, posture, motor skill, proprioception and motor activation to allow for proper function of core, proximal hip and LE with self care and ADLs and functional mobility.   [] (40521) Gait Re-education- Provided training and instruction to the patient for proper LE, core and proximal hip progressing as expected and requires additional follow up with physician  [] Other    Prognosis for POC: [x] Good [] Fair  [] Poor    Patient requires continued skilled intervention: [x] Yes  [] No        PLAN:   Manual to improve ROM  Progress strengthening as able  Gait  CP  *Assess left shoulder and give HEP when able    [x] Continue per plan of care [] Alter current plan (see comments)  [] Plan of care initiated [] Hold pending MD visit [] Discharge    Electronically signed by: Nina Velásquez PT    Note: If patient does not return for scheduled/recommended follow up visits, this note will serve as a discharge from care along with the most recent update on progress.

## 2021-02-08 NOTE — LETTER
[facility]  [place] [address] [address] [address]  Phone: [phone]  Fax: [phone]    Javon Agudelo MD        February 8, 2021     Patient: Forest Mcbride   YOB: 1960   Date of Visit: 2/8/2021       To Whom It May Concern: It is my medical opinion that Ayah Anne may return to work on 2/12/21. If you have any questions or concerns, please don't hesitate to call.     Sincerely,           Javon Agudelo MD

## 2021-02-10 ENCOUNTER — TELEPHONE (OUTPATIENT)
Dept: ORTHOPEDIC SURGERY | Age: 61
End: 2021-02-10

## 2021-02-10 ENCOUNTER — HOSPITAL ENCOUNTER (OUTPATIENT)
Dept: PHYSICAL THERAPY | Age: 61
Setting detail: THERAPIES SERIES
Discharge: HOME | End: 2021-02-10
Payer: COMMERCIAL

## 2021-02-10 PROCEDURE — 97530 THERAPEUTIC ACTIVITIES: CPT

## 2021-02-10 PROCEDURE — 97110 THERAPEUTIC EXERCISES: CPT

## 2021-02-10 NOTE — FLOWSHEET NOTE
[facility] - Outpatient Rehabilitation & Therapy  [address] [facility] [place] ([facility]. Wilmar Sotelo  Phone: [phone]   Fax:     [phone]        Physical Therapy Treatment Note/ Progress Report:      Date:  2/10/2021    Patient Name:  Medhat Queen    :  1960  MRN: [id number]    Pertinent Medical History: Additional Pertinent Hx: HTN, Spinal Surgery, Left TKR  Medical/Treatment Diagnosis Information:  · Diagnosis: I76.720 (ICD-10-CM) - Status post right knee replacement, Left Shoulder Pain  · Treatment Diagnosis: Pain. Decreased R knee ROM and strength  Insurance/Certification information:  PT Insurance Information: [address] [facility]  Physician Information:  Referring Practitioner: Dr. Gaudencio Pedroza of care signed (Y/N):     Date of Patient follow up with Physician:      Progress Report: []  Yes  [x]  No     Date Range for reporting period:  Beginnin/10/2021  Ending:      Progress report due (10 Rx/or 30 days whichever is less): 71     Recertification due (POC duration/ or 90 days whichever is less): 3/21/20     Visit # POC/Insurance Allowable Auth Needed   15 18 []Yes   [x]No     Latex Allergy:  [x]NO      []YES  Preferred Language for Healthcare:   [x]English       []Other:    Functional Scale:      Date assessed: at eval  Test: LEFS  Score:26 (60-80%)    Pain level:  1-2/10     History of Injury:   Pt states she had right TKR on 20. Did home PT for a few weeks at home. Using the [address] and sometimes without it at home. 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 Goal is to get back to work as [employer] at [employer] and to doing normal ADLs. 20: Left Shoulder Assessment: Pain is 3-8/10 in the left shoulder. Worse at night, wakes her up. Feels a lot of popping at night. States she had left RTC repair by Dr. Lilian Ferguson 4 years ago.  States she has had a few injections that helped some. States she is taking medications for her knee surgery which seems to have helped the pain. No noticeable weakness. Overhead reaching was bothering the shoulder. SUBJECTIVE:     01/11/21: Patient reports she has been having some calf soreness after doing more walking. 1/13/21: Doing good, shoulder is getting better. Hardest thing for her is the steps. 01/18/21: Patient reports reports she still getting some knee soreness after prolonged walking. States shoulder does not wake her up at night.  1/20/21: Feeling good today  1/27/21: Knee is doing good  1/28/21: Knee is doing well, little sore in the HS from the new exercise  2/1/21: States she is doing good. Still has some tightness in her lower leg  2/3/21: Pt states she is doing well  2/8/21: Saw MD today and he wants her to work on strengthening more. 2/10/21: Knee is a little sore from last treatment, rested it yesterday. OBJECTIVE:   Observation:    Test measurements:     12/23/20: 0-115 deg PROM R   12/29/20: 0-125 deg PROM   12/30/20: 0-130 deg PROM   1/4/20: 0-138.5 deg PROM   1/6/20: 0-140 deg PROM   1/6/20: Shoulder Evaluation:    Shoulder AROM: Right WNL    Left WNL Except Flexion 170  And PROM WNL except flexion 170   UE Strength:  Right WNL.  Left WNL except shoulder flexion 4/5 and ER 4/5   1/13/21: 0-140 Deg PROM Knee   1/20/21: R knee extension 3+/5   1/27/21: R knee extension and flexion 4-/5    2/1/21: R knee extension 4/5 and flexion 4-/5               RESTRICTIONS/PRECAUTIONS: R TKR on 12/2/20    Exercises/Interventions:     Therapeutic Ex (79311)   Min: Reps/Resistance Notes/CUES   Bike X 5 min,  level 12    Incline    HSS/HQS    Heel Slide    LAQ  HS Curl 2 x 20 R 7#  2 x 20 R 4 kg    Leg Press 105# 2 x 20  60# 2 x 20 R    Steps 6 inch  Fwd and Side 2 x 10 R    Shoulder Exercises Tslide High Row and LPD   Blue  ER/IR Dark Blue  Wall Slide and   Shoulder Scaption with 3# weight         Manual Intervention (67281)  Min:     Knee mobs/PROM R knee flexion and extension stretches with OP    Tib/Fem Mobs     Patella Mobs Right all directions    Ankle mobs               NMR re-education (47768)  Min:  CUES NEEDED             Therapeutic Activity (17699)  Min:     Gait training          Modalities  Min:     IFC     CP after exercises    MH after exercises            Other Therapeutic Activities: Pt was educated on PT POC, Diagnosis, Prognosis, pathomechanics as well as frequency and duration of scheduling future physical therapy appointments. Time was also taken on this day to answer all patient questions and participation in PT. Reviewed appointment policy in detail with patient and patient verbalized understanding. Home Exercise Program: Instructed patient on an HEP. Patient demonstrated exercises correctly. Handout with pictures and # of reps/sets was given. Exercises are: Seated, Supine, and standing knee flexion and extension stretch 3 x per day. HS and Gastroc stretch along with other HEP to do 2x per day. Given Handout      Therapeutic Exercise and NMR EXR  [x] (83164) Provided verbal/tactile cueing for activities related to strengthening, flexibility, endurance, ROM for improvements in LE, proximal hip, and core control with self care, mobility, lifting, ambulation. [x] (69189) Provided verbal/tactile cueing for activities related to improving balance, coordination, kinesthetic sense, posture, motor skill, proprioception  to assist with LE, proximal hip, and core control in self care, mobility, lifting, ambulation and eccentric single leg control.      NMR and Therapeutic Activities:    [] (93331 or 40861) Provided verbal/tactile cueing for activities related to improving balance, coordination, kinesthetic sense, posture, motor skill, proprioception and motor activation to allow for proper function of core, proximal hip and LE with self care and ADLs and functional mobility.   [] (98386) Gait Re-education- Provided training and instruction to the patient for proper LE, core and proximal hip recruitment and positioning and eccentric body weight control with ambulation re-education including up and down stairs     Home Exercise Program:    [x] (53530) Reviewed/Progressed HEP activities related to strengthening, flexibility, endurance, ROM of core, proximal hip and LE for functional self-care, mobility, lifting and ambulation/stair navigation   [] (47637)Reviewed/Progressed HEP activities related to improving balance, coordination, kinesthetic sense, posture, motor skill, proprioception of core, proximal hip and LE for self care, mobility, lifting, and ambulation/stair navigation      Manual Treatments:  PROM / STM / Oscillations-Mobs:  G-I, II, III, IV (PA's, Inf., Post.)  [x] (89208) Provided manual therapy to mobilize LE, proximal hip and/or LS spine soft tissue/joints for the purpose of modulating pain, promoting relaxation,  increasing ROM, reducing/eliminating soft tissue swelling/inflammation/restriction, improving soft tissue extensibility and allowing for proper ROM for normal function with self care, mobility, lifting and ambulation. If [facility] Please Indicate Time In/Out  CPT Code Time in Time out                         Charges:  Timed Code Treatment Minutes: 45   Total Treatment Minutes: 55      [] EVAL (LOW) 78277 (typically 20 minutes face-to-face)  [] EVAL (MOD) 53917 (typically 30 minutes face-to-face)  [] EVAL (HIGH) 18347 (typically 45 minutes face-to-face)  [] RE-EVAL     [x] AK(85980) x 3    [] Dry needle 1 or 2 Muscles (33505)  [] NMR (10195) x     [] Dry needle 3+ Muscles (70064)  [] Manual (09829) x     [] Ultrasound (51617) x  [] TA (35027) x     [] Mech Traction (60398)  [] ES(attended) (27829)     [] ES (un) (33076):   [] Vasopump (57883) [] Ionto (70387)   [] Other:    Marquita Ward stated goal: less pain, better sleep, normal activity  []? Progressing: []? Met: []?  Not Met: []? Adjusted     Therapist goals for Patient:   Short Term Goals: To be achieved in: 2 weeks  1. Independent in HEP and progression per patient tolerance, in order to prevent re-injury. []? Progressing: []? Met: []? Not Met: []? Adjusted  2. Patient will have a decrease in pain to facilitate improvement in movement, function, and ADLs as indicated by Functional Deficits. []? Progressing: []? Met: []? Not Met: []? Adjusted     Long Term Goals: To be achieved in: 6 weeks  1. Disability index score of 10% or less for the LEFS to assist with reaching prior level of function. []? Progressing: []? Met: []? Not Met: []? Adjusted  2. Patient will demonstrate increased AROM to 0-140 to allow for proper joint functioning as indicated by patients Functional Deficits. []? Progressing: []? Met: []? Not Met: []? Adjusted  3. Patient will demonstrate an increase in Strength to good proximal hip strength and control, within 5lb HHD in LE to allow for proper functional mobility as indicated by patients Functional Deficits. []? Progressing: []? Met: []? Not Met: []? Adjusted  4. Patient will return to normal functional activities without increased symptoms or restriction. []? Progressing: []? Met: []? Not Met: []? Adjusted  5. Pt will sleep through the night and be able to return to work (patient specific functional goal)    []? Progressing: []? Met: []? Not Met: []? Adjusted                 ASSESSMENT:  See eval    Treatment/Activity Tolerance:  [x] Patient tolerated treatment well [] Patient limited by fatique  [] Patient limited by pain  [] Patient limited by other medical complications  [] Other:     Overall Progression Towards Functional goals/ Treatment Progress Update:  [] Patient is progressing as expected towards functional goals listed. [] Progression is slowed due to complexities/Impairments listed. [] Progression has been slowed due to co-morbidities.   [x] Plan just implemented, too soon to assess goals progression <30days   [] Goals require adjustment due to lack of progress  [] Patient is not progressing as expected and requires additional follow up with physician  [] Other    Prognosis for POC: [x] Good [] Fair  [] Poor    Patient requires continued skilled intervention: [x] Yes  [] No        PLAN:   Manual to improve ROM  Progress strengthening as able  Gait  CP  *Assess left shoulder and give HEP when able    [x] Continue per plan of care [] Alter current plan (see comments)  [] Plan of care initiated [] Hold pending MD visit [] Discharge    Electronically signed by: Tim rFeeman PT    Note: If patient does not return for scheduled/recommended follow up visits, this note will serve as a discharge from care along with the most recent update on progress.

## 2021-02-10 NOTE — PROGRESS NOTES
Patient is a 67-year-old female who is status post bilateral total knee arthroplasties. She had bilateral robotic assisted uncemented total knee arthroplasties left knee done 5/26/2020 right knee done 12/2/2020. She is now here for evaluation and follow-up. Her left knee has done very well now just short of 10 months postop. Her right knee still experiences some pain however she is still just about 2-1/2 months postop. She had no problems recovering after her uncemented robotic assisted total knee arthroplasties. She is here for further evaluation and follow-up. Patient Active Problem List   Diagnosis    [facility] DJ[facility](carpometacarpal degenerative joint disease), localized primary    Fibromyalgia    Essential hypertension    Mixed hyperlipidemia    Dysthymia    Metatarsalgia of right foot    History of total knee arthroplasty, left    Left shoulder pain    Rotator cuff tendonitis, left    DAVID (obstructive sleep apnea)    Arthritis of right knee     Prior to Visit Medications    Medication Sig Taking? Authorizing Provider   buPROPion (WELLBUTRIN XL) 150 MG extended release tablet Take 1 tablet by mouth every morning  Olayinka Rodriguez MD   doxepin (SINEQUAN) 10 MG capsule Take 1 capsule by mouth nightly  Patient not taking: Reported on 1/25/2021  Latoya Anne MD   cyclobenzaprine (FLEXERIL) 5 MG tablet Take 1 tablet by mouth every evening  Olayinka Rodriguez MD   amitriptyline (ELAVIL) 25 MG tablet TAKE ONE TABLET BY MOUTH NIGHTLY  Olayinka Rodriguez MD   cephALEXin (KEFLEX) 500 MG capsule Take 1 capsule by mouth See Admin Instructions Take one capsule at 9pm today and one capsule at [address] [facility], APRN - CNP   aspirin EC 81 MG EC tablet Take 1 tablet by mouth 2 times daily for 14 days Please avoid missing doses.   Twyla Quiroga, APRN - CNP   metoprolol tartrate (LOPRESSOR) 25 MG tablet Take 1 tablet by mouth 2 times daily  Olayinka Rodriguez MD losartan-hydroCHLOROthiazide (HYZAAR) 100-12.5 MG per tablet TAKE 1 TABLET BY MOUTH ONCE DAILY  Kurtis Simon MD   atorvastatin (LIPITOR) 80 MG tablet TAKE 1 TABLET BY MOUTH ONCE DAILY  Kurtis Simon MD   Armodafinil (NUVIGIL) 150 MG TABS tablet one tab QAM prn  Federico Galvin MD   fluticasone (FLONASE) 50 MCG/ACT nasal spray 1 spray by Nasal route daily as needed   Historical Provider, MD     Physical examination 39-year-old female oriented x3 temperature is 98.1. Examination of her back shows good range of motion no specific pain tenderness or instability. Motor exam of both right and left lower extremity shows quadriceps hamstrings hip abductors abductors foot plantar dorsiflexors are intact 4-5 over 5. Sensation and perfusion are intact to both right and left lower extremity. There is no numbness or tingling noted in either right or left lower extremity. No obvious cutaneous lesions lymphedema or cellulitic processes are seen in either right or left lower extremity. Examination of both knees shows well-healed anterior scars full extension near calf on thigh flexion with good overall stability. No signs of deep sepsis are noted in either right or left knee. X-rays obtained AP lateral patellofemoral view of both right and left knee show status post bilateral uncemented total knee arthroplasties. Stable overall orientation alignment with no obvious signs of loosening or failure. Good patellofemoral mechanics are seen on x-ray for both right and left knee. In the left knee the patella is placed in a superior position however this does not appear to be affecting patient's symptoms range of motion or stability. No other obvious fractures tumors or dislocations are noted on these x-rays. Impression 39-year-old female stable status post bilateral uncemented robotic assisted total knee arthroplasties. Plan continue working on strengthening and range of motion follow-up in 6 to 8 months for repeat evaluation and x-rays. We had a 15-minute face-to-face discussion with this patient of which greater than 50% of time was spent on counseling her about further care and treatment of her bilateral total knees. We recommended continue exercise and activity. We also discussed the need for continued use of antibiotics around any dental and/or surgical approach for prophylaxis. We will follow her up as scheduled or as needed.

## 2021-02-10 NOTE — TELEPHONE ENCOUNTER
General Question     Subject: WORK RELEASE UPDATE  Patient and /or Facility Request: [address] Number: [phone]

## 2021-02-11 ENCOUNTER — TELEPHONE (OUTPATIENT)
Dept: ORTHOPEDIC SURGERY | Age: 61
End: 2021-02-11

## 2021-02-12 NOTE — TELEPHONE ENCOUNTER
Patient called back, she has therapy on Monday. She is thinking she will return to work on Wednesday with no restrictions. She will let us know Monday morning after therapy.

## 2021-02-15 ENCOUNTER — TELEPHONE (OUTPATIENT)
Dept: ORTHOPEDIC SURGERY | Age: 61
End: 2021-02-15

## 2021-02-15 ENCOUNTER — OFFICE VISIT (OUTPATIENT)
Dept: ORTHOPEDIC SURGERY | Age: 61
End: 2021-02-15
Payer: COMMERCIAL

## 2021-02-15 VITALS
TEMPERATURE: 97 F | HEART RATE: 93 BPM | SYSTOLIC BLOOD PRESSURE: 158 MMHG | DIASTOLIC BLOOD PRESSURE: 91 MMHG | HEIGHT: 62 IN | WEIGHT: 140 LBS | BODY MASS INDEX: 25.76 KG/M2

## 2021-02-15 DIAGNOSIS — M16.11 ARTHRITIS OF RIGHT HIP: ICD-10-CM

## 2021-02-15 DIAGNOSIS — Z96.651 STATUS POST RIGHT KNEE REPLACEMENT: ICD-10-CM

## 2021-02-15 DIAGNOSIS — M54.16 LUMBAR RADICULOPATHY: ICD-10-CM

## 2021-02-15 DIAGNOSIS — M51.36 DDD (DEGENERATIVE DISC DISEASE), LUMBAR: Primary | ICD-10-CM

## 2021-02-15 PROBLEM — M51.369 DDD (DEGENERATIVE DISC DISEASE), LUMBAR: Status: ACTIVE | Noted: 2021-02-15

## 2021-02-15 PROCEDURE — 99213 OFFICE O/P EST LOW 20 MIN: CPT | Performed by: PHYSICIAN ASSISTANT

## 2021-02-15 RX ORDER — METHYLPREDNISOLONE 4 MG/1
TABLET ORAL
Qty: 1 KIT | Refills: 0 | Status: SHIPPED | OUTPATIENT
Start: 2021-02-15 | End: 2021-09-21

## 2021-02-15 NOTE — LETTER
[address] [facility]  Chris Lopez [address] [place] [address]  Phone: [phone]  Fax: [address] [place], [place]        February 15, 2021     Patient: Milan Walden   YOB: 1960   Date of Visit: 2/15/2021       To Whom It May Concern: It is my medical opinion that Gala Griffin may return to work on February 22, 2021 with no restrictions. If you have any questions or concerns, please don't hesitate to call.     Sincerely,          EZE Kimbrough

## 2021-02-15 NOTE — TELEPHONE ENCOUNTER
Faxed new work note to [phone]. She is taking gabapentin 300mg. Alberto Nassar advised her to take 2-3 x a day as needed.

## 2021-02-15 NOTE — LETTER
[facility]  [facility] [place] [address] [address]  Phone: [phone]  Fax: [place], [address]        February 15, 2021     Patient: Pancho Dacosta   YOB: 1960   Date of Visit: 2/15/2021       To Whom It May Concern: It is my medical opinion that Elsa Gaitan may return to work on February 22, 2021. If you have any questions or concerns, please don't hesitate to call.     Sincerely,          EZE Jacobson

## 2021-02-15 NOTE — PROGRESS NOTES
Subjective:      Patient ID: Bryn Sherwood is a 63 y.o.  female. Chief Complaint   Patient presents with    Lower Back Pain     low back pain        HPI:   She is here for an initial evaluation of LBP and right leg pain-anterior thigh down to her right knee. Some buttock pain that radiates down the right lateral leg/calf and into her great toe. She has a history of a prior L4-5 lumbar fusion in early 2000. This initially gave her substantial relief of her back and leg pain. Symptoms have gradually returned over the past several years and over the past 1 to 2 months much worse. She does have a history of a recent right knee arthroplasty which was performed on 12/2/2020. She states her right knee is gradually improving. However, she does have some pain in her knee. Pain has changed since onset. More severe. Pain is associated with:  Numbness: Right greater than left lower extremity. Tingling: Right greater than left lower extremity. Perceived weakness: Right ankle. Difficulty controlling bowels: No.  Difficulty controlling bladder: No.  Electrical shock sensation in her legs: Yes. Difficulty with balance while standing or walking: Yes. The following changes pain for the better: Walking while leaning on a grocery cart or lying down. The following changes pain for the worse: Sitting, standing, rising from seated position. The following treatment has been tried:  Physical therapy: Currently in physical therapy for her right knee arthroplasty. She is doing some exercises for her low back and hip as well. .  Chiropractic treatment: None. Alma Banuelos Epidural steroid injection/block: None. .   Prescription medications: Currently on anti-inflammatory medications. Has used gabapentin in the past..   Over-the-counter medications: Tylenol. Review of Systems:  Pertinent items are noted in HPI. A 14 point review of systems have been reviewed from Patient History Form as well as the Spine Form dated on today's and available in the patient's chart under the media tab. Past Medical History:   Diagnosis Date    HBP (high blood pressure)     Hyperlipidemia     Obstructive sleep apnea on CPAP     Plantar fasciitis     Postoperative nausea     per patient lasted 3 weeks after knee replacement surgery    Seasonal allergies        Family History   Problem Relation Age of Onset    Arthritis Other     Asthma Other     Cancer Other     Diabetes Other     High Blood Pressure Other     Breast Cancer Mother     Other Mother        Past Surgical History:   Procedure Laterality Date    CARPAL TUNNEL RELEASE Right      SECTION  0788,6044, ,     COLONOSCOPY  2018    Dr. Shani Quach with polyp    FOOT NEUROMA SURGERY Right     HAND SURGERY Left     Thumb joint    HYSTERECTOMY      LASIK      SHOULDER SURGERY      left    SPINAL FUSION      L4    TOTAL KNEE ARTHROPLASTY Left 2020    ROBOTIC ASSISTED LEFT TOTAL KNEE REPLACEMENT performed by Margoth Myers MD at [address] [facility] Right 2020    RIGHT TOTAL KNEE REPLACEMENT ROBOTIC ASSISTED performed by Margoth Myers MD at [address] [facility] Not on file   Tobacco Use    Smoking status: Never Smoker    Smokeless tobacco: Never Used   Substance and Sexual Activity    Alcohol use: Yes     Comment: monthly, occasional drink    Drug use: No    Sexual activity: Yes     Partners: Male     Comment: hysterectomy       Current Outpatient Medications   Medication Sig Dispense Refill    methylPREDNISolone (MEDROL, RAFAT,) 4 MG tablet Take by mouth.  6 po day one 5 po day 2 4 po day 3 3 po day 4 2 po day 5 1 po day 6. 1 kit 0    buPROPion (WELLBUTRIN XL) 150 MG extended release tablet Take 1 tablet by mouth every morning 90 tablet 1 L4   Right                 0              2+   Left                 2+              2+     Sensory Exam: Intact to light touch in the left and right lower extremity. Special Testing: N= Negative  P= Positive   Straight leg raise Reverse straight leg raise Contralateral straight leg raise Log roll Kosta test (REBEKAH) Babinski Sinha's  test   Right     N     N     N     P     P      N   Left     N     N     N     N     N      N       Gait mildly antalgic felt to be related to her recent right knee arthroplasty. Left hip exam:  There is no deformity. There is no pain with internal and external rotation. There is no pain with flexion and extension. ROM- diminished range of motion. Trochanteric region is not tender to palpation. There is no pain with weight bearing. Right hip exam:  There is no deformity. There is mild pain with internal and external rotation. There is mild pain with flexion and extension. ROM- diminished range with pain. Trochanteric region is not tender to palpation. There is no pain with weight bearing. Vascular exam:  Examination of bilateral lower extremities:   Pallor or Rubor: absent. Digits are warm to touch, capillary refill is less than 2 seconds. There is No edema noted. Skin exam:  Examination of the skin over both lower extremities reveals: The skin to be intact without lacerations or abrasions. No significant erythema. No rashes or skin lesions. X Rays: performed in the office today:   AP and Lateral Lumbar Spine Radiographs:  Hardware noted at L4-5 consistent with prior L4-5 fusion. Mild degenerative disc disease at L5-S1 as well as L3/L4. Mild degenerative changes at both left and right hip noted. Additional tests reviewed:  None      Diagnosis:        ICD-10-CM    1. DDD (degenerative disc disease), lumbar  M51.36 XR LUMBAR SPINE (2-3 VIEWS)   2. Lumbar radiculopathy  M54.16    3.  Status post right knee replacement  Z96.651 4. Arthritis of right hip  M16.11         Assessment/ Plan:        Assessment:  Low back pain right leg pain with an absent right Achilles reflex and weakness with EHL and anterior tibialis testing. History of prior L4-5 fusion. Positive right hip exam as well with mild right hip arthritis on the lumbar spine film. Recent right knee arthroplasty. 25 minutes was the total time spent on today's visit  including reviewing test results, obtaining or reviewing history, physical exam, counseling and educating, time spent on documentation in health record, ordering prescriptions, tests or procedures after the visit. Plan:  Medications- Medrol Dose pack. She may resume her gabapentin. She is to call me later today with the dose which she has at home for further instructions. She will be given a note to be off work x1 week. PT-continue physical therapy regarding her right knee arthroplasty. They are also working with her back and hip. Further Imaging-right and left hip x-rays next week. Procedures-none indicated. Follow up- 1 week. Call or return to clinic if these symptoms worsen or fail to improve as anticipated.

## 2021-02-16 ENCOUNTER — APPOINTMENT (OUTPATIENT)
Dept: PHYSICAL THERAPY | Age: 61
End: 2021-02-16
Payer: COMMERCIAL

## 2021-02-18 ENCOUNTER — HOSPITAL ENCOUNTER (OUTPATIENT)
Dept: PHYSICAL THERAPY | Age: 61
Setting detail: THERAPIES SERIES
Discharge: HOME | End: 2021-02-18
Payer: COMMERCIAL

## 2021-02-18 PROCEDURE — 97110 THERAPEUTIC EXERCISES: CPT

## 2021-02-18 PROCEDURE — 97530 THERAPEUTIC ACTIVITIES: CPT

## 2021-02-18 NOTE — FLOWSHEET NOTE
[facility] - Outpatient Rehabilitation & Therapy  [address] ([facility]. Wilmar Sotelo  Phone: [phone]   Fax:     [phone]        Physical Therapy Treatment Note/ Progress Report:      Date:  2021    Patient Name:  Yoan Guidry    :  1960  MRN: [id number]    Pertinent Medical History: Additional Pertinent Hx: HTN, Spinal Surgery, Left TKR  Medical/Treatment Diagnosis Information:  · Diagnosis: N23.834 (ICD-10-CM) - Status post right knee replacement, Left Shoulder Pain  · Treatment Diagnosis: Pain. Decreased R knee ROM and strength  Insurance/Certification information:  PT Insurance Information: [address] [facility]  Physician Information:  Referring Practitioner: Dr. Arleen Jarquin of care signed (Y/N):     Date of Patient follow up with Physician:      Progress Report: []  Yes  [x]  No     Date Range for reporting period:  Beginnin2021  Ending:      Progress report due (10 Rx/or 30 days whichever is less):      Recertification due (POC duration/ or 90 days whichever is less): 3/21/20     Visit # POC/Insurance Allowable Auth Needed   16 18 []Yes   [x]No     Latex Allergy:  [x]NO      []YES  Preferred Language for Healthcare:   [x]English       []Other:    Functional Scale:      Date assessed: at eval  Test: LEFS  Score:26 (60-80%)    Pain level:  1-2/10     History of Injury:   Pt states she had right TKR on 20. Did home PT for a few weeks at home. Using the [facility] and sometimes without it at home. 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 Goal is to get back to work as [employer] at [employer] and to doing normal ADLs. 20: Left Shoulder Assessment: Pain is 3-8/10 in the left shoulder. Worse at night, wakes her up. Feels a lot of popping at night. States she had left RTC repair by Dr. Tyler Whatley 4 years ago.  States she has had a few injections that helped some. States she is taking medications for her knee surgery which seems to have helped the pain. No noticeable weakness. Overhead reaching was bothering the shoulder. SUBJECTIVE:       1/28/21: Knee is doing well, little sore in the HS from the new exercise  2/1/21: States she is doing good. Still has some tightness in her lower leg  2/3/21: Pt states she is doing well  2/8/21: Saw MD today and he wants her to work on strengthening more. 2/10/21: Knee is a little sore from last treatment, rested it yesterday. 2/18/21: Pt states she had a rough week. States whole body acted up and she took steroids which has helped. OBJECTIVE:   Observation:    Test measurements:     12/23/20: 0-115 deg PROM R   12/29/20: 0-125 deg PROM   12/30/20: 0-130 deg PROM   1/4/20: 0-138.5 deg PROM   1/6/20: 0-140 deg PROM   1/6/20: Shoulder Evaluation:    Shoulder AROM: Right WNL    Left WNL Except Flexion 170  And PROM WNL except flexion 170   UE Strength:  Right WNL.  Left WNL except shoulder flexion 4/5 and ER 4/5   1/13/21: 0-140 Deg PROM Knee   1/20/21: R knee extension 3+/5   1/27/21: R knee extension and flexion 4-/5    2/1/21: R knee extension 4/5 and flexion 4-/5            RESTRICTIONS/PRECAUTIONS: R TKR on 12/2/20    Exercises/Interventions:     Therapeutic Ex (79179)   Min: Reps/Resistance Notes/CUES   Bike X 5 min,  level 12    Incline    HSS/HQS    Heel Slide    LAQ  HS Curl 2 x 20 R 7#  2 x 20 R 4 kg    Leg Press 105# 2 x 20  60# 2 x 20 R    Steps 6 inch  Fwd and Side 2 x 10 R    Shoulder Exercises Tslide High Row and LPD   Blue  ER/IR Dark Blue  Wall Slide and   Shoulder Scaption with 3# weight         Manual Intervention (67868)  Min:     Knee mobs/PROM R knee flexion and extension stretches with OP    Tib/Fem Mobs     Patella Mobs Right all directions    Ankle mobs               NMR re-education (83798)  Min:  CUES NEEDED             Therapeutic Activity (27631)  Min:     Gait training Modalities  Min:     IFC     CP after exercises    MH after exercises            Other Therapeutic Activities: Pt was educated on PT POC, Diagnosis, Prognosis, pathomechanics as well as frequency and duration of scheduling future physical therapy appointments. Time was also taken on this day to answer all patient questions and participation in PT. Reviewed appointment policy in detail with patient and patient verbalized understanding. 2/18/21: Assessed radicular symptoms in RLE. Reflexes are decreased in R patella and achilles. Ankle DF is 4+/5. Does have decreased sensation in L5/S1 dermatome. Prone on elbows decreased numbness some. Home Exercise Program: Instructed patient on an HEP. Patient demonstrated exercises correctly. Handout with pictures and # of reps/sets was given. Exercises are: Seated, Supine, and standing knee flexion and extension stretch 3 x per day. HS and Gastroc stretch along with other HEP to do 2x per day. Given Handout     2/18/21: Added YVON x 5 min to HEP and PPT      Therapeutic Exercise and NMR EXR  [x] (85963) Provided verbal/tactile cueing for activities related to strengthening, flexibility, endurance, ROM for improvements in LE, proximal hip, and core control with self care, mobility, lifting, ambulation. [x] (29135) Provided verbal/tactile cueing for activities related to improving balance, coordination, kinesthetic sense, posture, motor skill, proprioception  to assist with LE, proximal hip, and core control in self care, mobility, lifting, ambulation and eccentric single leg control.      NMR and Therapeutic Activities:    [] (66687 or 85815) Provided verbal/tactile cueing for activities related to improving balance, coordination, kinesthetic sense, posture, motor skill, proprioception and motor activation to allow for proper function of core, proximal hip and LE with self care and ADLs and functional mobility.   [] (07635) Gait Re-education- Provided training and instruction to the patient for proper LE, core and proximal hip recruitment and positioning and eccentric body weight control with ambulation re-education including up and down stairs     Home Exercise Program:    [x] (87262) Reviewed/Progressed HEP activities related to strengthening, flexibility, endurance, ROM of core, proximal hip and LE for functional self-care, mobility, lifting and ambulation/stair navigation   [] (59632)Reviewed/Progressed HEP activities related to improving balance, coordination, kinesthetic sense, posture, motor skill, proprioception of core, proximal hip and LE for self care, mobility, lifting, and ambulation/stair navigation      Manual Treatments:  PROM / STM / Oscillations-Mobs:  G-I, II, III, IV (PA's, Inf., Post.)  [x] (67269) Provided manual therapy to mobilize LE, proximal hip and/or LS spine soft tissue/joints for the purpose of modulating pain, promoting relaxation,  increasing ROM, reducing/eliminating soft tissue swelling/inflammation/restriction, improving soft tissue extensibility and allowing for proper ROM for normal function with self care, mobility, lifting and ambulation. If [facility] Please Indicate Time In/Out  CPT Code Time in Time out                         Charges:  Timed Code Treatment Minutes: 55   Total Treatment Minutes: 76      [] EVAL (LOW) 76543 (typically 20 minutes face-to-face)  [] EVAL (MOD) 43820 (typically 30 minutes face-to-face)  [] EVAL (HIGH) 83897 (typically 45 minutes face-to-face)  [] RE-EVAL     [x] VU(91741) x 3    [] Dry needle 1 or 2 Muscles (07358)  [] NMR (47705) x     [] Dry needle 3+ Muscles (10088)  [] Manual (82205) x     [] Ultrasound (72720) x  [x] TA (84183) x     [] Mech Traction (01830)  [] ES(attended) (85125)     [] ES (un) (00637):   [] Vasopump (00474)  [] Ionto (44269)   [] Other:    [facility] stated goal: less pain, better sleep, normal activity  []? Progressing: []? Met: []? Not Met: []?  Adjusted     Therapist goals for Patient:   Short Term Goals: To be achieved in: 2 weeks  1. Independent in HEP and progression per patient tolerance, in order to prevent re-injury. []? Progressing: []? Met: []? Not Met: []? Adjusted  2. Patient will have a decrease in pain to facilitate improvement in movement, function, and ADLs as indicated by Functional Deficits. []? Progressing: []? Met: []? Not Met: []? Adjusted     Long Term Goals: To be achieved in: 6 weeks  1. Disability index score of 10% or less for the LEFS to assist with reaching prior level of function. []? Progressing: []? Met: []? Not Met: []? Adjusted  2. Patient will demonstrate increased AROM to 0-140 to allow for proper joint functioning as indicated by patients Functional Deficits. []? Progressing: []? Met: []? Not Met: []? Adjusted  3. Patient will demonstrate an increase in Strength to good proximal hip strength and control, within 5lb HHD in LE to allow for proper functional mobility as indicated by patients Functional Deficits. []? Progressing: []? Met: []? Not Met: []? Adjusted  4. Patient will return to normal functional activities without increased symptoms or restriction. []? Progressing: []? Met: []? Not Met: []? Adjusted  5. Pt will sleep through the night and be able to return to work (patient specific functional goal)    []? Progressing: []? Met: []? Not Met: []? Adjusted                 ASSESSMENT:  See eval    Treatment/Activity Tolerance:  [x] Patient tolerated treatment well [] Patient limited by fatique  [] Patient limited by pain  [] Patient limited by other medical complications  [] Other:     Overall Progression Towards Functional goals/ Treatment Progress Update:  [] Patient is progressing as expected towards functional goals listed. [] Progression is slowed due to complexities/Impairments listed. [] Progression has been slowed due to co-morbidities.   [x] Plan just implemented, too soon to assess goals progression <30days   [] Goals require adjustment due to lack of progress  [] Patient is not progressing as expected and requires additional follow up with physician  [] Other    Prognosis for POC: [x] Good [] Fair  [] Poor    Patient requires continued skilled intervention: [x] Yes  [] No        PLAN:   Manual to improve ROM  Progress strengthening as able  Gait  CP  *Assess left shoulder and give HEP when able    [x] Continue per plan of care [] Alter current plan (see comments)  [] Plan of care initiated [] Hold pending MD visit [] Discharge    Electronically signed by: Navid Cheng PT    Note: If patient does not return for scheduled/recommended follow up visits, this note will serve as a discharge from care along with the most recent update on progress.

## 2021-02-23 ENCOUNTER — OFFICE VISIT (OUTPATIENT)
Dept: ORTHOPEDIC SURGERY | Age: 61
End: 2021-02-23
Payer: COMMERCIAL

## 2021-02-23 ENCOUNTER — HOSPITAL ENCOUNTER (OUTPATIENT)
Dept: PHYSICAL THERAPY | Age: 61
Setting detail: THERAPIES SERIES
Discharge: HOME | End: 2021-02-23
Payer: COMMERCIAL

## 2021-02-23 VITALS — TEMPERATURE: 97.2 F | HEIGHT: 62 IN | BODY MASS INDEX: 25.76 KG/M2 | WEIGHT: 140 LBS

## 2021-02-23 DIAGNOSIS — Z96.651 STATUS POST RIGHT KNEE REPLACEMENT: ICD-10-CM

## 2021-02-23 DIAGNOSIS — M16.11 ARTHRITIS OF RIGHT HIP: ICD-10-CM

## 2021-02-23 DIAGNOSIS — M51.36 DDD (DEGENERATIVE DISC DISEASE), LUMBAR: Primary | ICD-10-CM

## 2021-02-23 PROCEDURE — 97110 THERAPEUTIC EXERCISES: CPT

## 2021-02-23 PROCEDURE — 99212 OFFICE O/P EST SF 10 MIN: CPT | Performed by: PHYSICIAN ASSISTANT

## 2021-02-23 NOTE — FLOWSHEET NOTE
[facility] - Outpatient Rehabilitation & Therapy  [id number] [place] [address]. Wilmar Vivas  Phone: [phone]   Fax:     [phone]        Physical Therapy Treatment Note/ Progress Report:      Date:  2021    Patient Name:  Denisha Irby    :  1960  MRN: [id number]    Pertinent Medical History: Additional Pertinent Hx: HTN, Spinal Surgery, Left TKR  Medical/Treatment Diagnosis Information:  · Diagnosis: R06.903 (ICD-10-CM) - Status post right knee replacement, Left Shoulder Pain  · Treatment Diagnosis: Pain. Decreased R knee ROM and strength  Insurance/Certification information:  PT Insurance Information: [facility]  Physician Information:  Referring Practitioner: Dr. Zunilda Canela of care signed (Y/N):     Date of Patient follow up with Physician:      Progress Report: []  Yes  [x]  No     Date Range for reporting period:  Beginnin2021  Ending:      Progress report due (10 Rx/or 30 days whichever is less):      Recertification due (POC duration/ or 90 days whichever is less): 3/21/20     Visit # POC/Insurance Allowable Auth Needed   17 18 []Yes   [x]No     Latex Allergy:  [x]NO      []YES  Preferred Language for Healthcare:   [x]English       []Other:    Functional Scale:      Date assessed: at eval  Test: LEFS  Score:26 (60-80%)    Pain level:  1-2/10     History of Injury:   Pt states she had right TKR on 20. Did home PT for a few weeks at home. Using the [facility] and sometimes without it at home. 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 Goal is to get back to work as [employer] at [employer] and to doing normal ADLs. 20: Left Shoulder Assessment: Pain is 3-8/10 in the left shoulder. Worse at night, wakes her up. Feels a lot of popping at night. States she had left RTC repair by Dr. Navi Garrison 4 years ago.  States she has had a few injections that helped some. States she is taking medications for her knee surgery which seems to have helped the pain. No noticeable weakness. Overhead reaching was bothering the shoulder. SUBJECTIVE:       2/8/21: Saw MD today and he wants her to work on strengthening more. 2/10/21: Knee is a little sore from last treatment, rested it yesterday. 2/18/21: Pt states she had a rough week. States whole body acted up and she took steroids which has helped. 2/23/21: Pt states her back is doing better. Still has some of the symptoms in her R leg that are still a little worse. OBJECTIVE:   Observation:    Test measurements:     12/23/20: 0-115 deg PROM R   12/29/20: 0-125 deg PROM   12/30/20: 0-130 deg PROM   1/4/20: 0-138.5 deg PROM   1/6/20: 0-140 deg PROM   1/6/20: Shoulder Evaluation:    Shoulder AROM: Right WNL    Left WNL Except Flexion 170  And PROM WNL except flexion 170   UE Strength:  Right WNL.  Left WNL except shoulder flexion 4/5 and ER 4/5   1/13/21: 0-140 Deg PROM Knee   1/20/21: R knee extension 3+/5   1/27/21: R knee extension and flexion 4-/5    2/1/21: R knee extension 4/5 and flexion 4-/5            RESTRICTIONS/PRECAUTIONS: R TKR on 12/2/20    Exercises/Interventions:     Therapeutic Ex (28326)   Min: Reps/Resistance Notes/CUES   Bike X 5 min,  level 12    Incline    HSS/HQS    Heel Slide    LAQ  HS Curl 2 x 20 R 7#  2 x 20 R 4 kg    Leg Press 105# 2 x 20  60# 2 x 20 B    Steps 6 inch  Fwd and Side 2 x 15 R    Shoulder Exercises     YVON X 3 min Slight improvement in symptoms   Manual Intervention (92631)  Min:     Knee mobs/PROM R knee flexion and extension stretches with OP    Tib/Fem Mobs     Patella Mobs Right all directions    Ankle mobs               NMR re-education (30491)  Min:  CUES NEEDED             Therapeutic Activity (48264)  Min:     Gait training          Modalities  Min:     IFC     CP after exercises    MH after exercises            Other Therapeutic Activities: Pt was educated on PT POC, Diagnosis, Prognosis, pathomechanics as well as frequency and duration of scheduling future physical therapy appointments. Time was also taken on this day to answer all patient questions and participation in PT. Reviewed appointment policy in detail with patient and patient verbalized understanding. 2/18/21: Assessed radicular symptoms in RLE. Reflexes are decreased in R patella and achilles. Ankle DF is 4+/5. Does have decreased sensation in L5/S1 dermatome. Prone on elbows decreased numbness some. Home Exercise Program: Instructed patient on an HEP. Patient demonstrated exercises correctly. Handout with pictures and # of reps/sets was given. Exercises are: Seated, Supine, and standing knee flexion and extension stretch 3 x per day. HS and Gastroc stretch along with other HEP to do 2x per day. Given Handout     2/18/21: Added YVON x 5 min to HEP and PPT and w/ alt. marching      Therapeutic Exercise and NMR EXR  [x] (47115) Provided verbal/tactile cueing for activities related to strengthening, flexibility, endurance, ROM for improvements in LE, proximal hip, and core control with self care, mobility, lifting, ambulation. [x] (22400) Provided verbal/tactile cueing for activities related to improving balance, coordination, kinesthetic sense, posture, motor skill, proprioception  to assist with LE, proximal hip, and core control in self care, mobility, lifting, ambulation and eccentric single leg control.      NMR and Therapeutic Activities:    [] (39334 or 18803) Provided verbal/tactile cueing for activities related to improving balance, coordination, kinesthetic sense, posture, motor skill, proprioception and motor activation to allow for proper function of core, proximal hip and LE with self care and ADLs and functional mobility.   [] (18851) Gait Re-education- Provided training and instruction to the patient for proper LE, core and proximal hip recruitment and positioning and eccentric body weight control with ambulation re-education including up and down stairs     Home Exercise Program:    [x] (96031) Reviewed/Progressed HEP activities related to strengthening, flexibility, endurance, ROM of core, proximal hip and LE for functional self-care, mobility, lifting and ambulation/stair navigation   [] (60703)Reviewed/Progressed HEP activities related to improving balance, coordination, kinesthetic sense, posture, motor skill, proprioception of core, proximal hip and LE for self care, mobility, lifting, and ambulation/stair navigation      Manual Treatments:  PROM / STM / Oscillations-Mobs:  G-I, II, III, IV (PA's, Inf., Post.)  [x] (76296) Provided manual therapy to mobilize LE, proximal hip and/or LS spine soft tissue/joints for the purpose of modulating pain, promoting relaxation,  increasing ROM, reducing/eliminating soft tissue swelling/inflammation/restriction, improving soft tissue extensibility and allowing for proper ROM for normal function with self care, mobility, lifting and ambulation. If [facility] Please Indicate Time In/Out  CPT Code Time in Time out                         Charges:  Timed Code Treatment Minutes: 50   Total Treatment Minutes: 50      [] EVAL (LOW) 78528 (typically 20 minutes face-to-face)  [] EVAL (MOD) 95413 (typically 30 minutes face-to-face)  [] EVAL (HIGH) 85490 (typically 45 minutes face-to-face)  [] RE-EVAL     [x] RA(59807) x 3    [] Dry needle 1 or 2 Muscles (41435)  [] NMR (23274) x     [] Dry needle 3+ Muscles (62788)  [] Manual (16881) x     [] Ultrasound (88859) x  [] TA (31830) x     [] Mech Traction (36762)  [] ES(attended) (79619)     [] ES (un) (09913):   [] Vasopump (03141)  [] Ionto (16536)   [] Other:    [facility] Tellez stated goal: less pain, better sleep, normal activity  []? Progressing: []? Met: []? Not Met: []? Adjusted     Therapist goals for Patient:   Short Term Goals: To be achieved in: 2 weeks  1.  Independent in HEP and progression per patient tolerance, in order to prevent re-injury. []? Progressing: []? Met: []? Not Met: []? Adjusted  2. Patient will have a decrease in pain to facilitate improvement in movement, function, and ADLs as indicated by Functional Deficits. []? Progressing: []? Met: []? Not Met: []? Adjusted     Long Term Goals: To be achieved in: 6 weeks  1. Disability index score of 10% or less for the LEFS to assist with reaching prior level of function. []? Progressing: []? Met: []? Not Met: []? Adjusted  2. Patient will demonstrate increased AROM to 0-140 to allow for proper joint functioning as indicated by patients Functional Deficits. []? Progressing: []? Met: []? Not Met: []? Adjusted  3. Patient will demonstrate an increase in Strength to good proximal hip strength and control, within 5lb HHD in LE to allow for proper functional mobility as indicated by patients Functional Deficits. []? Progressing: []? Met: []? Not Met: []? Adjusted  4. Patient will return to normal functional activities without increased symptoms or restriction. []? Progressing: []? Met: []? Not Met: []? Adjusted  5. Pt will sleep through the night and be able to return to work (patient specific functional goal)    []? Progressing: []? Met: []? Not Met: []? Adjusted                 ASSESSMENT:  See eval    Treatment/Activity Tolerance:  [x] Patient tolerated treatment well [] Patient limited by fatique  [] Patient limited by pain  [] Patient limited by other medical complications  [] Other:     Overall Progression Towards Functional goals/ Treatment Progress Update:  [] Patient is progressing as expected towards functional goals listed. [] Progression is slowed due to complexities/Impairments listed. [] Progression has been slowed due to co-morbidities.   [x] Plan just implemented, too soon to assess goals progression <30days   [] Goals require adjustment due to lack of progress  [] Patient is not progressing as expected and requires additional follow up with physician  [] Other    Prognosis for POC: [x] Good [] Fair  [] Poor    Patient requires continued skilled intervention: [x] Yes  [] No        PLAN:   Next session re-assess strength and add lumbar exercises if get script    Manual to improve ROM  Progress strengthening as able  Gait  CP  *Assess left shoulder and give HEP when able    [x] Continue per plan of care [] Alter current plan (see comments)  [] Plan of care initiated [] Hold pending MD visit [] Discharge    Electronically signed by: Dilcia Walls PT    Note: If patient does not return for scheduled/recommended follow up visits, this note will serve as a discharge from care along with the most recent update on progress.

## 2021-02-23 NOTE — PROGRESS NOTES
Subjective:      Patient ID: Tamela Watters is a 63 y.o. female. Chief Complaint   Patient presents with    Lower Back Pain     low back pain        HPI:   She is here for follow up during her low back pain and right hip and leg pain. She is also recently status post knee arthroplasty. She states her right leg symptoms have significantly improved with the recent completion of a Medrol Dosepak. She states that this time she has minimal hip and leg pain. She has her usual standard low back pain. This is pretty much her baseline. Review Of Systems:   Negative for fever or chills. Past Medical History:   Diagnosis Date    HBP (high blood pressure)     Hyperlipidemia     Obstructive sleep apnea on CPAP     Plantar fasciitis     Postoperative nausea     per patient lasted 3 weeks after knee replacement surgery    Seasonal allergies        Family History   Problem Relation Age of Onset    Arthritis Other     Asthma Other     Cancer Other     Diabetes Other     High Blood Pressure Other     Breast Cancer Mother     Other Mother        Past Surgical History:   Procedure Laterality Date    CARPAL TUNNEL RELEASE Right      SECTION  0294,3275, ,     COLONOSCOPY  2018    Dr. Phani Abdullahi with polyp    FOOT NEUROMA SURGERY Right     HAND SURGERY Left     Thumb joint    HYSTERECTOMY      LASIK      SHOULDER SURGERY      left    SPINAL FUSION      L4    TOTAL KNEE ARTHROPLASTY Left 2020    ROBOTIC ASSISTED LEFT TOTAL KNEE REPLACEMENT performed by Lindajean Harada, MD at [facility] Right 2020    RIGHT TOTAL KNEE REPLACEMENT ROBOTIC ASSISTED performed by Lindajean Harada, MD at [facility] Not on file   Tobacco Use    Smoking status: Never Smoker    Smokeless tobacco: Never Used   Substance and Sexual Activity    Alcohol use: Yes     Comment: monthly, occasional drink    Drug use:  No  Sexual activity: Yes     Partners: Male     Comment: hysterectomy       Current Outpatient Medications   Medication Sig Dispense Refill    methylPREDNISolone (MEDROL, RAFAT,) 4 MG tablet Take by mouth. 6 po day one 5 po day 2 4 po day 3 3 po day 4 2 po day 5 1 po day 6. 1 kit 0    buPROPion (WELLBUTRIN XL) 150 MG extended release tablet Take 1 tablet by mouth every morning 90 tablet 1    cyclobenzaprine (FLEXERIL) 5 MG tablet Take 1 tablet by mouth every evening 90 tablet 3    amitriptyline (ELAVIL) 25 MG tablet TAKE ONE TABLET BY MOUTH NIGHTLY 90 tablet 3    metoprolol tartrate (LOPRESSOR) 25 MG tablet Take 1 tablet by mouth 2 times daily 180 tablet 3    losartan-hydroCHLOROthiazide (HYZAAR) 100-12.5 MG per tablet TAKE 1 TABLET BY MOUTH ONCE DAILY 90 tablet 3    atorvastatin (LIPITOR) 80 MG tablet TAKE 1 TABLET BY MOUTH ONCE DAILY 90 tablet 3    Armodafinil (NUVIGIL) 150 MG TABS tablet one tab QAM prn 30 tablet 5    fluticasone (FLONASE) 50 MCG/ACT nasal spray 1 spray by Nasal route daily as needed        No current facility-administered medications for this visit. Objective:     She is alert, oriented x 3, pleasant, well nourished, developed and in no   acute distress. Temp 97.2 °F (36.2 °C) (Temporal)   Ht 5' 2\" (1.575 m)   Wt 140 lb (63.5 kg)   LMP  (LMP Unknown)   BMI 25.61 kg/m²      Lumbar Spine Exam:  Deformity: Absent . Soft Tissue Swelling: Absent . Soft Tissue Tenderness: Present. Midline Bone Tenderness:Present. Paraspinal Muscular Spasm: Present. Previous Incisions: Present. Erythema Absent . Lumbar Flexion does produce pain.   Lumbar Extension does produce pain.           Strength Scale: 1-5    Extensor Hallucis Longus L5 Anterior Tibialis   L4 Quadriceps   L2,3,4    Right             4            4         5   Left             5            5         5      Reflex Exam: 0-4+    Achilles Tendon (gastroc)   S1 Patellar Tendon (quads)   L4 Right                 0              2+   Left                 2+              2+      Sensory Exam: Intact to light touch in the left and right lower extremity.     Special Testing: N= Negative  P= Positive    Straight leg raise Reverse straight leg raise Contralateral straight leg raise Log roll Kosta test (REBEKAH) Babinski Sinha's  test   Right     N     N     N     P     P       N   Left     N     N     N     N     N       N         Gait mildly antalgic felt to be related to her recent right knee arthroplasty.     Left hip exam:  There is no deformity. There is no pain with internal and external rotation. There is no pain with flexion and extension. ROM- diminished range of motion. Trochanteric region is not tender to palpation. There is no pain with weight bearing. Right hip exam:  There is no deformity. There is mild pain with internal and external rotation. There is mild pain with flexion and extension. ROM- diminished range with pain. Trochanteric region is not tender to palpation. There is no pain with weight bearing. Vascular exam:  Examination of bilateral lower extremities:   Pallor or Rubor: absent. Digits are warm to touch, capillary refill is less than 2 seconds. There is No edema noted.      Skin exam:  Examination of the skin over both lower extremities reveals: The skin to be intact without lacerations or abrasions. No significant erythema. No rashes or skin lesions. X Rays: not performed in the office today:       Diagnosis:       ICD-10-CM    1. DDD (degenerative disc disease), lumbar  M51.36 Ambulatory referral to Physical Therapy   2. Arthritis of right hip  M16.11 Ambulatory referral to Physical Therapy   3.  Status post right knee replacement  Z96.651         Assessment and Plan:       Assessment: Clinically stable right knee arthroplasty 12/2/2020, doing very well. Degenerative disc disease lumbar spine with probable some stenosis. Mild right hip arthritis. The symptoms are much better status post completion of Medrol Dosepak. 15 minutes was the total time spent on today's visit  including reviewing test results, obtaining or reviewing history, physical exam, counseling and educating, time spent on documentation in health record, ordering prescriptions, tests or procedures after the visit. Plan:  Medications- OTC NSAIDS discussed. She  was advised that NSAID-type medications have two very important potential side effects: gastrointestinal irritation including hemorrhage and renal injuries. She was asked to take the medication with food and to stop if she experiences any GI upset. I asked her to call for vomiting, abdominal pain or black/bloody stools. She should have renal function testing per his medical provider periodically. The patient expresses understanding of these issues and questions were answered. PT-Rx for core strengthening exercises/lumbar spine provided today for instruction in a home exercise program.    Further Imaging-discussed x-rays of the hips if symptoms return. May require MRI lumbar spine in the future. Procedures-not indicated this time. Follow up- 3 months. Call or return to clinic if these symptoms worsen or fail to improve as anticipated.

## 2021-04-16 ENCOUNTER — OFFICE VISIT (OUTPATIENT)
Dept: FAMILY MEDICINE CLINIC | Age: 61
End: 2021-04-16
Payer: COMMERCIAL

## 2021-04-16 VITALS
SYSTOLIC BLOOD PRESSURE: 134 MMHG | OXYGEN SATURATION: 98 % | DIASTOLIC BLOOD PRESSURE: 82 MMHG | HEART RATE: 84 BPM | WEIGHT: 140.6 LBS | BODY MASS INDEX: 25.88 KG/M2 | HEIGHT: 62 IN

## 2021-04-16 DIAGNOSIS — E78.2 MIXED HYPERLIPIDEMIA: ICD-10-CM

## 2021-04-16 DIAGNOSIS — G89.29 ACUTE EXACERBATION OF CHRONIC LOW BACK PAIN: ICD-10-CM

## 2021-04-16 DIAGNOSIS — Z12.31 ENCOUNTER FOR SCREENING MAMMOGRAM FOR MALIGNANT NEOPLASM OF BREAST: ICD-10-CM

## 2021-04-16 DIAGNOSIS — Z00.00 ROUTINE GENERAL MEDICAL EXAMINATION AT A HEALTH CARE FACILITY: Primary | ICD-10-CM

## 2021-04-16 DIAGNOSIS — M54.50 ACUTE EXACERBATION OF CHRONIC LOW BACK PAIN: ICD-10-CM

## 2021-04-16 DIAGNOSIS — I10 ESSENTIAL HYPERTENSION: ICD-10-CM

## 2021-04-16 LAB
A/G RATIO: 2.1 (ref 1.1–2.2)
ALBUMIN SERPL-MCNC: 4.8 G/DL (ref 3.4–5)
ALP BLD-CCNC: 75 U/L (ref 40–129)
ALT SERPL-CCNC: 34 U/L (ref 10–40)
ANION GAP SERPL CALCULATED.3IONS-SCNC: 13 MMOL/L (ref 3–16)
AST SERPL-CCNC: 25 U/L (ref 15–37)
BILIRUB SERPL-MCNC: 0.5 MG/DL (ref 0–1)
BUN BLDV-MCNC: 18 MG/DL (ref 7–20)
CALCIUM SERPL-MCNC: 9.3 MG/DL (ref 8.3–10.6)
CHLORIDE BLD-SCNC: 99 MMOL/L (ref 99–110)
CHOLESTEROL, TOTAL: 173 MG/DL (ref 0–199)
CO2: 29 MMOL/L (ref 21–32)
CREAT SERPL-MCNC: 0.7 MG/DL (ref 0.6–1.2)
GFR AFRICAN AMERICAN: >60
GFR NON-AFRICAN AMERICAN: >60
GLOBULIN: 2.3 G/DL
GLUCOSE BLD-MCNC: 89 MG/DL (ref 70–99)
HDLC SERPL-MCNC: 57 MG/DL (ref 40–60)
LDL CHOLESTEROL CALCULATED: 89 MG/DL
POTASSIUM SERPL-SCNC: 3.7 MMOL/L (ref 3.5–5.1)
SODIUM BLD-SCNC: 141 MMOL/L (ref 136–145)
TOTAL PROTEIN: 7.1 G/DL (ref 6.4–8.2)
TRIGL SERPL-MCNC: 135 MG/DL (ref 0–150)
VLDLC SERPL CALC-MCNC: 27 MG/DL

## 2021-04-16 PROCEDURE — 99396 PREV VISIT EST AGE 40-64: CPT | Performed by: FAMILY MEDICINE

## 2021-04-16 PROCEDURE — 36415 COLL VENOUS BLD VENIPUNCTURE: CPT | Performed by: FAMILY MEDICINE

## 2021-04-16 NOTE — PROGRESS NOTES
2021    Josselyn Gasapr (:  1960) is a 63 y.o. female, here for evaluation of the following chief complaint(s):  Neck Pain (chronic. started getting worse 6 mo ago. ) and Back Pain      ASSESSMENT/PLAN:     Diagnosis Orders   1. Routine general medical examination at a health care facility     2. Encounter for screening mammogram for malignant neoplasm of breast  REBECCA DIGITAL SCREEN W OR WO CAD BILATERAL    mammo ordered   3. Mixed hyperlipidemia  Lipid Panel    Comprehensive Metabolic Panel    LFTs lipids on statin cont   4. Essential hypertension  Comprehensive Metabolic Panel    at goal cont meds check renal   5. Acute exacerbation of chronic low back pain   [facility]    see EZE Diaz in May and advised muscle relaxer but flexeril not helping; No follow-ups on file. An electronic signature was used to authenticate this note.     @SIG@    SUBJECTIVE/OBJECTIVE:  (NOTE : prior results listed below reviewed at this visit to assist in medical decision making.)    HPI / ROS    # Preventive and other issues     # screen breast cancer - screening status discussed with patient; reviewed today prior mammo dated 2020; orderd next for 2021     # screen cervical cancer - screening status discussed with patient     # screen colon cancer - screening status discussed with patient; reviewed today prior testing dated  Dr. Lerma Player with hyperplastic polyp fragments; next 10 years         # Hyperlipidemia on statin rigoberto this no myalgias or weakness LIPIDS DUE on statin        Lab Results   Component Value Date     LDLCALC 123 (H) 2019      No results found for: ALT, AST, GGT, ALKPHOS, BILITOT      # HTN - rigoberto meds no CP/SOB        Lab Results   Component Value Date      2020     K 3.4 2020      2020     CO2 30 2020     BUN 18 2020     CREATININE 0.5 2020     GLUCOSE 100 2020     CALCIUM 9.5 2020          # acute for low back pain acute on chronic; has had knees done TKR bilaterally Previously saw Dr. Dana Sales saw EZE Remy has appt 26 MAY      Wt Readings from Last 3 Encounters:   04/16/21 140 lb 9.6 oz (63.8 kg)   02/23/21 140 lb (63.5 kg)   02/15/21 140 lb (63.5 kg)       BP Readings from Last 3 Encounters:   04/16/21 134/82   02/15/21 (!) 158/91   01/25/21 120/72       PHYSICAL EXAM  Vitals:    04/16/21 1446   BP: 134/82   Pulse: 84   SpO2: 98%   Weight: 140 lb 9.6 oz (63.8 kg)   Height: 5' 2\" (1.575 m)     A&o  Neck no TMG no bruit  Car reg no MGR  Lungs cta  Ext no edema  Skin no jaundice  Eyes anicteric  SLR neg bilaterally

## 2021-04-20 ENCOUNTER — OFFICE VISIT (OUTPATIENT)
Dept: ORTHOPEDIC SURGERY | Age: 61
End: 2021-04-20
Payer: COMMERCIAL

## 2021-04-20 VITALS
WEIGHT: 140 LBS | SYSTOLIC BLOOD PRESSURE: 127 MMHG | HEIGHT: 62 IN | HEART RATE: 72 BPM | BODY MASS INDEX: 25.76 KG/M2 | DIASTOLIC BLOOD PRESSURE: 74 MMHG

## 2021-04-20 DIAGNOSIS — Z98.1 S/P LUMBAR FUSION: ICD-10-CM

## 2021-04-20 DIAGNOSIS — M16.11 ARTHRITIS OF RIGHT HIP: Primary | ICD-10-CM

## 2021-04-20 DIAGNOSIS — M54.16 LUMBAR RADICULAR PAIN: ICD-10-CM

## 2021-04-20 PROCEDURE — 99214 OFFICE O/P EST MOD 30 MIN: CPT | Performed by: PHYSICIAN ASSISTANT

## 2021-04-20 RX ORDER — MELOXICAM 15 MG/1
15 TABLET ORAL DAILY
Qty: 30 TABLET | Refills: 2 | Status: SHIPPED | OUTPATIENT
Start: 2021-04-20 | End: 2021-09-21

## 2021-04-20 NOTE — PROGRESS NOTES
Subjective:      Patient ID: Hien Lemon is a 63 y.o. female. Chief Complaint   Patient presents with    Lower Back Pain     low back pain        HPI:   She is here for follow up her low back pain and a right leg pain. She reported some improvement with the Mobic temporarily. Symptoms have returned. She has completed physical therapy and has not noticed much improvement. She has constant low back pain upon awakening in the morning and her leg pain progresses throughout the day. She does have some groin pain on the right side more so than the left side. She has a history of a prior L4-5 fusion several years ago. .    She denies any bladder or bowel changes. She does have some concerns about weakness about her right ankle. Review Of Systems:   Negative for fever or chills.     Past Medical History:   Diagnosis Date    HBP (high blood pressure)     Hyperlipidemia     Obstructive sleep apnea on CPAP     Plantar fasciitis     Postoperative nausea     per patient lasted 3 weeks after knee replacement surgery    Seasonal allergies        Family History   Problem Relation Age of Onset    Arthritis Other     Asthma Other     Cancer Other     Diabetes Other     High Blood Pressure Other     Breast Cancer Mother     Other Mother        Past Surgical History:   Procedure Laterality Date    CARPAL TUNNEL RELEASE Right      SECTION  1970,, ,     COLONOSCOPY  2018    Dr. Lisa Nayak with polyp    FOOT NEUROMA SURGERY Right     HAND SURGERY Left     Thumb joint    HYSTERECTOMY      LASIK      SHOULDER SURGERY      left    SPINAL FUSION      L4    TOTAL KNEE ARTHROPLASTY Left 2020    ROBOTIC ASSISTED LEFT TOTAL KNEE REPLACEMENT performed by Corby Dickens MD at [facility] Right 2020    RIGHT TOTAL KNEE REPLACEMENT ROBOTIC ASSISTED performed by Corby Dickens MD at [address] [place] [address] Not on file Tobacco Use    Smoking status: Never Smoker    Smokeless tobacco: Never Used   Substance and Sexual Activity    Alcohol use: Yes     Comment: monthly, occasional drink    Drug use: No    Sexual activity: Yes     Partners: Male     Comment: hysterectomy       Current Outpatient Medications   Medication Sig Dispense Refill    methylPREDNISolone (MEDROL, RAFAT,) 4 MG tablet Take by mouth. 6 po day one 5 po day 2 4 po day 3 3 po day 4 2 po day 5 1 po day 6. 1 kit 0    buPROPion (WELLBUTRIN XL) 150 MG extended release tablet Take 1 tablet by mouth every morning 90 tablet 1    amitriptyline (ELAVIL) 25 MG tablet TAKE ONE TABLET BY MOUTH NIGHTLY 90 tablet 3    metoprolol tartrate (LOPRESSOR) 25 MG tablet Take 1 tablet by mouth 2 times daily 180 tablet 3    losartan-hydroCHLOROthiazide (HYZAAR) 100-12.5 MG per tablet TAKE 1 TABLET BY MOUTH ONCE DAILY 90 tablet 3    atorvastatin (LIPITOR) 80 MG tablet TAKE 1 TABLET BY MOUTH ONCE DAILY 90 tablet 3    Armodafinil (NUVIGIL) 150 MG TABS tablet one tab QAM prn 30 tablet 5    fluticasone (FLONASE) 50 MCG/ACT nasal spray 1 spray by Nasal route daily as needed        No current facility-administered medications for this visit. Objective:     She is alert, oriented x 3, pleasant, well nourished, developed and in no   acute distress. /74   Pulse 72   Ht 5' 2\" (1.575 m)   Wt 140 lb (63.5 kg)   LMP  (LMP Unknown)   BMI 25.61 kg/m²      Lumbar Spine Exam:  Deformity: Absent . Soft Tissue Swelling: Absent . Soft Tissue Tenderness: Present. Midline Bone Tenderness:Present. Paraspinal Muscular Spasm: Present. Previous Incisions: Present. Erythema Absent .  Lumbar Flexion does produce pain.   Lumbar Extension does produce pain.           Strength Scale: 1-5    Extensor Hallucis Longus L5 Anterior Tibialis   L4 Quadriceps   L2,3,4    Right             4            4         5   Left             6            5         5      Reflex Exam: 0-4+    Range 4/16/2021 15:24   Sodium Latest Ref Range: 136 - 145 mmol/L 141   Potassium Latest Ref Range: 3.5 - 5.1 mmol/L 3.7   Chloride Latest Ref Range: 99 - 110 mmol/L 99   CO2 Latest Ref Range: 21 - 32 mmol/L 29   BUN Latest Ref Range: 7 - 20 mg/dL 18   Creatinine Latest Ref Range: 0.6 - 1.2 mg/dL 0.7   Anion Gap Latest Ref Range: 3 - 16  13   GFR Non- Latest Ref Range: >60  >60   GFR  Latest Ref Range: >60  >60   Glucose Latest Ref Range: 70 - 99 mg/dL 89   Calcium Latest Ref Range: 8.3 - 10.6 mg/dL 9.3   Total Protein Latest Ref Range: 6.4 - 8.2 g/dL 7.1   Cholesterol, Total Latest Ref Range: 0 - 199 mg/dL 173   HDL Cholesterol Latest Ref Range: 40 - 60 mg/dL 57   LDL Calculated Latest Ref Range: <100 mg/dL 89   Triglycerides Latest Ref Range: 0 - 150 mg/dL 135   VLDL Cholesterol Calculated Latest Ref Range: Not Established mg/dL 27   Albumin Latest Ref Range: 3.4 - 5.0 g/dL 4.8   Globulin Latest Units: g/dL 2.3   Albumin/Globulin Ratio Latest Ref Range: 1.1 - 2.2  2.1   Alk Phos Latest Ref Range: 40 - 129 U/L 75   ALT Latest Ref Range: 10 - 40 U/L 34   AST Latest Ref Range: 15 - 37 U/L 25   Bilirubin Latest Ref Range: 0.0 - 1.0 mg/dL 0.5       Diagnosis:       ICD-10-CM    1. Arthritis of right hip  M16.11 XR PELVIS (1-2 VIEWS)   2. S/P lumbar fusion  Z98.1 MRI LUMBAR SPINE W WO CONTRAST   3. Lumbar radicular pain  M54.16 MRI LUMBAR SPINE W WO CONTRAST        Assessment and Plan:       Assessment:  Chronic low back pain with right leg radicular pain. She is failed a formal course of physical therapy for her back pain. She has tried over-the-counter anti-inflammatory medications and acetaminophen without relief. I believe she has 2 issues going on one is low back pain with lumbar radicular pain, probable some degree of stenosis of the lumbar spine. Also believe she may have a right hip intra-articular pathology such as a labral tear. Plan:  Medications-try Mobic 15 mg daily. PT- A home exercise program was instructed today including ROM exercises and strengthening exercises. The patient verbalized understanding of these exercises as well as the importance of the exercise program to promote return of normal function. If pain intensifies or other problems arise you are to notify the office. Further Imaging-MRI lumbar spine with gadolinium. Recent GFR greater than 60. Procedures-discussed right hip intra-articular injection. Recent first COVID-19 vaccination. Scheduled for second vaccination within 1 to 2 weeks. She would like to hold off on her right hip injection at this time. Follow up-after MRI to review test results. Call or return to clinic if these symptoms worsen or fail to improve as anticipated.

## 2021-04-21 ENCOUNTER — TELEPHONE (OUTPATIENT)
Dept: ORTHOPEDIC SURGERY | Age: 61
End: 2021-04-21

## 2021-04-21 RX ORDER — CEPHALEXIN 500 MG/1
CAPSULE ORAL
Qty: 4 CAPSULE | Refills: 3 | Status: SHIPPED | OUTPATIENT
Start: 2021-04-21 | End: 2021-10-11 | Stop reason: ALTCHOICE

## 2021-04-21 NOTE — TELEPHONE ENCOUNTER
PATIENT IS IN NEED OF A ANTIBIOTIC FOR HER UPCOMING DENTIST APPT. SHE CAN BE REACHED -[phone].     [address]: [phone]

## 2021-05-04 ENCOUNTER — HOSPITAL ENCOUNTER (OUTPATIENT)
Dept: MRI IMAGING | Age: 61
Discharge: HOME | End: 2021-05-04
Payer: COMMERCIAL

## 2021-05-04 DIAGNOSIS — Z98.1 S/P LUMBAR FUSION: ICD-10-CM

## 2021-05-04 DIAGNOSIS — M54.16 LUMBAR RADICULAR PAIN: ICD-10-CM

## 2021-05-04 PROCEDURE — A9577 INJ MULTIHANCE: HCPCS | Performed by: PHYSICIAN ASSISTANT

## 2021-05-04 PROCEDURE — 72158 MRI LUMBAR SPINE W/O & W/DYE: CPT

## 2021-05-04 PROCEDURE — 6360000004 HC RX CONTRAST MEDICATION: Performed by: PHYSICIAN ASSISTANT

## 2021-05-04 RX ADMIN — GADOBENATE DIMEGLUMINE 15 ML: 529 INJECTION, SOLUTION INTRAVENOUS at 07:50

## 2021-06-07 ENCOUNTER — OFFICE VISIT (OUTPATIENT)
Dept: ORTHOPEDIC SURGERY | Age: 61
End: 2021-06-07
Payer: COMMERCIAL

## 2021-06-07 VITALS
BODY MASS INDEX: 25.76 KG/M2 | HEART RATE: 61 BPM | DIASTOLIC BLOOD PRESSURE: 74 MMHG | WEIGHT: 140 LBS | SYSTOLIC BLOOD PRESSURE: 127 MMHG | HEIGHT: 62 IN

## 2021-06-07 DIAGNOSIS — M48.061 FORAMINAL STENOSIS OF LUMBAR REGION: Primary | ICD-10-CM

## 2021-06-07 PROCEDURE — 99213 OFFICE O/P EST LOW 20 MIN: CPT | Performed by: PHYSICIAN ASSISTANT

## 2021-06-08 PROBLEM — M48.061 FORAMINAL STENOSIS OF LUMBAR REGION: Status: ACTIVE | Noted: 2021-06-08

## 2021-06-08 NOTE — PROGRESS NOTES
Subjective:      Patient ID: Nadir Allen is a 57 y.o. female. Chief Complaint   Patient presents with    Results     MRI lumbar        HPI:   She is here for follow up on her lumbar spine MRI results. She continues to have complaints of low back pain and a right leg pain. She reported some improvement with the Mobic temporarily. Symptoms have returned. She has completed physical therapy and has not noticed much improvement. She has constant low back pain upon awakening in the morning and her leg pain progresses throughout the day. She does have some groin pain on the right side more so than the left side. She has a history of a prior L4-5 fusion several years ago. .        Review Of Systems:   Negative for fever or chills. She denies any bladder or bowel changes. She denies any gait disturbances.        Past Medical History:   Diagnosis Date    HBP (high blood pressure)     Hyperlipidemia     Obstructive sleep apnea on CPAP     Plantar fasciitis     Postoperative nausea     per patient lasted 3 weeks after knee replacement surgery    Seasonal allergies        Family History   Problem Relation Age of Onset    Arthritis Other     Asthma Other     Cancer Other     Diabetes Other     High Blood Pressure Other     Breast Cancer Mother     Other Mother        Past Surgical History:   Procedure Laterality Date    CARPAL TUNNEL RELEASE Right      SECTION  7063,4861, 8474, 1994    COLONOSCOPY  2018    Dr. Dagmar Arias with polyp    FOOT NEUROMA SURGERY Right     HAND SURGERY Left     Thumb joint    HYSTERECTOMY      LASIK      SHOULDER SURGERY      left    SPINAL FUSION      L4    TOTAL KNEE ARTHROPLASTY Left 2020    ROBOTIC ASSISTED LEFT TOTAL KNEE REPLACEMENT performed by Monisha Francis MD at [place] [address] Right 2020    RIGHT TOTAL KNEE REPLACEMENT ROBOTIC ASSISTED performed [facility] MD [facility] Occupational History    Not on file   Tobacco Use    Smoking status: Never Smoker    Smokeless tobacco: Never Used   Vaping Use    Vaping Use: Never used   Substance and Sexual Activity    Alcohol use: Yes     Comment: monthly, occasional drink    Drug use: No    Sexual activity: Yes     Partners: Male     Comment: hysterectomy       Current Outpatient Medications   Medication Sig Dispense Refill    cephALEXin (KEFLEX) 500 MG capsule 2 po 2 hours pre procedure and 2 po 2 hours post procedure 4 capsule 3    meloxicam (MOBIC) 15 MG tablet Take 1 tablet by mouth daily 30 tablet 2    methylPREDNISolone (MEDROL, RAFAT,) 4 MG tablet Take by mouth. 6 po day one 5 po day 2 4 po day 3 3 po day 4 2 po day 5 1 po day 6. 1 kit 0    buPROPion (WELLBUTRIN XL) 150 MG extended release tablet Take 1 tablet by mouth every morning 90 tablet 1    amitriptyline (ELAVIL) 25 MG tablet TAKE ONE TABLET BY MOUTH NIGHTLY 90 tablet 3    metoprolol tartrate (LOPRESSOR) 25 MG tablet Take 1 tablet by mouth 2 times daily 180 tablet 3    losartan-hydroCHLOROthiazide (HYZAAR) 100-12.5 MG per tablet TAKE 1 TABLET BY MOUTH ONCE DAILY 90 tablet 3    atorvastatin (LIPITOR) 80 MG tablet TAKE 1 TABLET BY MOUTH ONCE DAILY 90 tablet 3    Armodafinil (NUVIGIL) 150 MG TABS tablet one tab QAM prn 30 tablet 5    fluticasone (FLONASE) 50 MCG/ACT nasal spray 1 spray by Nasal route daily as needed        No current facility-administered medications for this visit. Objective:     She is alert, oriented x 3, pleasant, well nourished, developed and in no   acute distress. /74   Pulse 61   Ht 5' 2\" (1.575 m)   Wt 140 lb (63.5 kg)   LMP  (LMP Unknown)   BMI 25.61 kg/m²      Lumbar Spine Exam:  Deformity: Absent . Soft Tissue Swelling: Absent . Soft Tissue Tenderness: Present. Midline Bone Tenderness:Present. Paraspinal Muscular Spasm: Present. Previous Incisions: Present. Erythema Absent .  Lumbar Flexion does produce pain. Lumbar Extension does produce pain.           Strength Scale: 1-5    Extensor Hallucis Longus L5 Anterior Tibialis   L4 Quadriceps   L2,3,4    Right             4            4         5   Left             6            5         5      Reflex Exam: 0-4+    Achilles Tendon (gastroc)   S1 Patellar Tendon (quads)   L4   Right                 0              2+   Left                 2+              2+      Sensory Exam: Intact to light touch in the left and right lower extremity.     Special Testing: N= Negative  P= Positive    Straight leg raise Reverse straight leg raise Contralateral straight leg raise Log roll Kosta test (REBEKAH) Babinski Sinha's  test   Right     N     N     N     P     P       N   Left     N     N     N     N     N       N         Gait mildly antalgic felt to be related to her recent right knee arthroplasty.     Left hip exam:  There is no deformity. There is no pain with internal and external rotation. There is no pain with flexion and extension. ROM- diminished range of motion. Trochanteric region is not tender to palpation. There is no pain with weight bearing.       Right hip exam:  There is no deformity. There is mild pain with internal and external rotation. There is mild pain with flexion and extension. ROM- diminished range with pain. Trochanteric region is not tender to palpation. There is no pain with weight bearing.        Vascular exam:  Examination of bilateral lower extremities:   Pallor or Rubor: absent. Digits are warm to touch, capillary refill is less than 2 seconds. There is No edema noted.      Skin exam:  Examination of the skin over both lower extremities reveals: The skin to be intact without lacerations or abrasions. No significant erythema. No rashes or skin lesions.         X Rays: not performed in the office today:   MRI: Obtained from [id number] [facility] or an outside facility.   Narrative   EXAMINATION:   MRI OF THE LUMBAR SPINE WITHOUT AND WITH CONTRAST  5/4/2021 narrowing.       L4-L5: There is a posterior osteophyte with facet arthrosis contributing to   mild-to-moderate right and minimal left neural foraminal narrowing.  No   significant spinal canal stenosis.       L5-S1: There is a disc bulge with bilateral facet arthrosis.  Mild bilateral   neural foraminal narrowing.  No significant spinal canal stenosis.         Impression   1. Posterior fusion hardware involving L4-L5. 2. Minimal spinal canal stenosis at L3-L4.  No significant spinal canal   stenosis at the remaining levels. 3. Multilevel neural foraminal narrowing as above. 4. Straightening of the normal lumbar lordosis without spondylolisthesis         Diagnosis:       ICD-10-CM    1. Foraminal stenosis of lumbar region  M48.061 Marianela Mckinney MD, Pain Management, [facility]        Assessment and Plan:       Assessment:  Multilevel neuroforaminal narrowing which I believe is causing most of her right thigh pain and low back pain. He has had failed other conservative treatment options including oral medications such as Mobic and prednisone. Physical therapy has also been tried without significant improvement. She remains neurologically intact. Procedures-     At this time, I do not believe spinal surgery is indicated. She may benefit other therapeutic options such as epidural steroid injection, facet injection or other interventional procedures. For this reason, I am going to refer to Dr. Rigoberto Adames for Interventional Pain Management for an evaluation and treatment. Follow up:  Call or return to clinic if these symptoms worsen or fail to improve as anticipated.

## 2021-06-16 RX ORDER — BUPROPION HYDROCHLORIDE 150 MG/1
TABLET ORAL
Qty: 90 TABLET | Refills: 3 | Status: SHIPPED | OUTPATIENT
Start: 2021-06-16 | End: 2021-08-30

## 2021-06-30 ENCOUNTER — HOSPITAL ENCOUNTER (OUTPATIENT)
Dept: WOMENS IMAGING | Age: 61
Discharge: HOME | End: 2021-06-30
Payer: COMMERCIAL

## 2021-06-30 DIAGNOSIS — Z12.31 ENCOUNTER FOR SCREENING MAMMOGRAM FOR MALIGNANT NEOPLASM OF BREAST: ICD-10-CM

## 2021-06-30 PROCEDURE — 77063 BREAST TOMOSYNTHESIS BI: CPT

## 2021-08-02 ENCOUNTER — TELEPHONE (OUTPATIENT)
Dept: FAMILY MEDICINE CLINIC | Age: 61
End: 2021-08-02

## 2021-08-02 NOTE — TELEPHONE ENCOUNTER
PT has bad congestion and was wanting to schedule a VV. No availability. Please Advise.  Patient can be reached at [phone]

## 2021-08-03 ENCOUNTER — VIRTUAL VISIT (OUTPATIENT)
Dept: FAMILY MEDICINE CLINIC | Age: 61
End: 2021-08-03
Payer: COMMERCIAL

## 2021-08-03 DIAGNOSIS — J01.90 ACUTE NON-RECURRENT SINUSITIS, UNSPECIFIED LOCATION: Primary | ICD-10-CM

## 2021-08-03 PROCEDURE — 99213 OFFICE O/P EST LOW 20 MIN: CPT | Performed by: FAMILY MEDICINE

## 2021-08-03 RX ORDER — PREDNISONE 10 MG/1
10 TABLET ORAL 2 TIMES DAILY
Qty: 10 TABLET | Refills: 0 | Status: SHIPPED | OUTPATIENT
Start: 2021-08-03 | End: 2021-08-08

## 2021-08-03 RX ORDER — AMOXICILLIN AND CLAVULANATE POTASSIUM 875; 125 MG/1; MG/1
1 TABLET, FILM COATED ORAL 2 TIMES DAILY
Qty: 14 TABLET | Refills: 0 | Status: SHIPPED | OUTPATIENT
Start: 2021-08-03 | End: 2021-08-10

## 2021-08-03 NOTE — PROGRESS NOTES
identification was verified at the start of the visit: Yes    Total time spent on this encounter: Not billed by time    Services were provided through a video synchronous discussion virtually to substitute for in-person clinic visit. Patient and provider were located at their individual homes. --Esequiel Hernandez MD on 8/3/2021 at 11:55 AM    An electronic signature was used to authenticate this note.

## 2021-08-31 RX ORDER — BUPROPION HYDROCHLORIDE 150 MG/1
TABLET ORAL
Qty: 90 TABLET | Refills: 3 | Status: SHIPPED | OUTPATIENT
Start: 2021-08-31

## 2021-09-21 NOTE — PROGRESS NOTES
[facility] [facility] time___9/24/21 0930_________        Surgery time__1030__________    Take the following medications with a sip of water:    Do not eat or drink anything after 12:00 midnight prior to your surgery. This includes water chewing gum, mints and ice chips. You may brush your teeth and gargle the morning of your surgery, but do not swallow the water     Please see your family doctor/pediatrician for a history and physical and/or concerning medications. Bring any test results/reports from your physicians office. If you are under the care of a heart doctor or specialist doctor, please be aware that you may be asked to them for clearance    You may be asked to stop blood thinners such as Coumadin, Plavix, Fragmin, Lovenox, etc., or any anti-inflammatories such as:  Aspirin, Ibuprofen, Advil, Naproxen prior to your surgery. We also ask that you stop any OTC medications such as fish oil, vitamin E, glucosamine, garlic, Multivitamins, COQ 10, etc.    We ask that you do not smoke 24 hours prior to surgery  We ask that you do not  drink any alcoholic beverages 24 hours prior to surgery     You must make arrangements for a responsible adult to take you home after your surgery. For your safety you will not be allowed to leave alone or drive yourself home. Your surgery will be cancelled if you do not have a ride home. Also for your safety, it is strongly suggested that someone stay with you the first 24 hours after your surgery. A parent or legal guardian must accompany a child scheduled for surgery and plan to stay at the hospital until the child is discharged. Please do not bring other children with you. For your comfort, please wear simple loose fitting clothing to the hospital.  Please do not bring valuables.     Do not wear any make-up or nail polish on your fingers or toes      For your safety, please do not wear any jewelry or body piercing's on the day of surgery. All jewelry must be removed. If you have dentures, they will be removed before going to operating room. For your convenience, we will provide you with a container. If you wear contact lenses or glasses, they will be removed, please bring a case for them. If you have a living will and a durable power of  for healthcare, please bring in a copy. As part of our patient safety program to minimize surgical site infections, we ask you to do the following:    · Please notify your surgeon if you develop any illness between         now and the  day of your surgery. · This includes a cough, cold, fever, sore throat, nausea,         or vomiting, and diarrhea, etc.  ·  Please notify your surgeon if you experience dizziness, shortness         of breath or blurred vision between now and the time of your surgery. Do not shave your operative site 96 hours prior to surgery. For face and neck surgery, men may use an electric razor 48 hours   prior to surgery. You may shower the night before surgery or the morning of   your surgery with an antibacterial soap. You will need to bring a photo ID and insurance card    [facility] has an onsite pharmacy, would you like to utilize our pharmacy     If you will be staying overnight and use a C-pap machine, please bring   your C-pap to hospital     Our goal is to provide you with excellent care, therefore, visitors will be limited to two(2) in the room at a time so that we may focus on providing this care for you. Please contact pre-admission testing if you have any further questions. [facility] phone number:  [phone]  Please note these are generalized instructions for all surgical cases, you may be provided with more specific instructions according to your surgery.

## 2021-09-24 ENCOUNTER — HOSPITAL ENCOUNTER (OUTPATIENT)
Age: 61
Setting detail: OUTPATIENT SURGERY
Discharge: HOME | End: 2021-09-24
Attending: ANESTHESIOLOGY | Admitting: ANESTHESIOLOGY
Payer: COMMERCIAL

## 2021-09-24 ENCOUNTER — APPOINTMENT (OUTPATIENT)
Dept: INTERVENTIONAL RADIOLOGY/VASCULAR | Age: 61
End: 2021-09-24
Attending: ANESTHESIOLOGY
Payer: COMMERCIAL

## 2021-09-24 VITALS
BODY MASS INDEX: 27.05 KG/M2 | TEMPERATURE: 97.4 F | SYSTOLIC BLOOD PRESSURE: 139 MMHG | HEART RATE: 56 BPM | OXYGEN SATURATION: 99 % | DIASTOLIC BLOOD PRESSURE: 66 MMHG | WEIGHT: 147 LBS | HEIGHT: 62 IN | RESPIRATION RATE: 16 BRPM

## 2021-09-24 PROCEDURE — 6360000002 HC RX W HCPCS: Performed by: ANESTHESIOLOGY

## 2021-09-24 PROCEDURE — 3610000054 HC PAIN LEVEL 3 BASE (NON-OR): Performed by: ANESTHESIOLOGY

## 2021-09-24 PROCEDURE — 6360000004 HC RX CONTRAST MEDICATION: Performed by: ANESTHESIOLOGY

## 2021-09-24 PROCEDURE — 2709999900 HC NON-CHARGEABLE SUPPLY: Performed by: ANESTHESIOLOGY

## 2021-09-24 RX ORDER — METHYLPREDNISOLONE ACETATE 80 MG/ML
INJECTION, SUSPENSION INTRA-ARTICULAR; INTRALESIONAL; INTRAMUSCULAR; SOFT TISSUE
Status: COMPLETED | OUTPATIENT
Start: 2021-09-24 | End: 2021-09-24

## 2021-09-24 ASSESSMENT — PAIN DESCRIPTION - LOCATION: LOCATION: BACK

## 2021-09-24 ASSESSMENT — PAIN DESCRIPTION - FREQUENCY: FREQUENCY: CONTINUOUS

## 2021-09-24 ASSESSMENT — PAIN DESCRIPTION - PROGRESSION: CLINICAL_PROGRESSION: GRADUALLY IMPROVING

## 2021-09-24 ASSESSMENT — PAIN DESCRIPTION - ONSET: ONSET: AWAKENED FROM SLEEP

## 2021-09-24 ASSESSMENT — PAIN - FUNCTIONAL ASSESSMENT
PAIN_FUNCTIONAL_ASSESSMENT: 0-10
PAIN_FUNCTIONAL_ASSESSMENT: PREVENTS OR INTERFERES SOME ACTIVE ACTIVITIES AND ADLS

## 2021-09-24 ASSESSMENT — PAIN DESCRIPTION - PAIN TYPE: TYPE: CHRONIC PAIN

## 2021-09-24 ASSESSMENT — PAIN DESCRIPTION - DESCRIPTORS
DESCRIPTORS: ACHING;DULL
DESCRIPTORS: ACHING;DULL

## 2021-09-24 ASSESSMENT — PAIN SCALES - GENERAL
PAINLEVEL_OUTOF10: 0
PAINLEVEL_OUTOF10: 2

## 2021-09-24 ASSESSMENT — PAIN DESCRIPTION - ORIENTATION: ORIENTATION: RIGHT;LEFT;LOWER

## 2021-09-24 NOTE — PROGRESS NOTES
Patient and responsible adult verbalized understanding of discharge instructions, , and potential complications including pain. Patient instructed to call Doctor if complications occur.

## 2021-09-24 NOTE — OP NOTE
Patient:  Candie Nation  YOB: 1960  Medical Record #:  [id number]   Place: [facility]  Date:  9/24/2021   Physician:  Rebeca Harding MD    PRE-PROCEDURE DIAGNOSIS: M54.17    POST-PROCEDURE DIAGNOSIS: M54.17    PROCEDURE:  Midline interlaminar right L5-S1 epidural steroid injection with fluoroscopy and epidurography. BRIEF HISTORY:  The patient presents today to [facility] for a scheduled lumbar epidural steroid injection procedure. The patient was re-evaluated today and is clinically unchanged as compared to my previous evaluation. The patient is clinically stable to proceed with the procedure. PROCEDURE NOTE:  The procedure was again explained to the patient and the previously distributed pre-procedure literature was reviewed. The options, rationale, and benefits of the procedure including pain relief, functional improvement, and increased mobility, as well as the risks of the procedure including but not limited to infection, bleeding, paresthesia, pain, failure to relieve pain, increased pain, headache, allergic reaction, neurologic impairment, local anesthetic, toxicity, and side effects and the potential side effects of corticosteroids were discussed with the patient and informed written consent was obtained from the patient. The patient was positioned in the prone position on the fluoroscopy table. The skin overlying the lumbosacral vertebrae was prepped using Chloraprep and draped in the usual sterile fashion. The L5-S1 lumbar intervertebral level was identified using intermittent AP fluoroscopy. The previously identified projection of overlying skin was anesthetized using 2 cc of buffered 1% lidocaine with a 27 gauge needle. A 3.5\" 22g Touhy needle was advanced through a small skin nick in the AP view towards the interlaminar and epidural space. The epidural space was easily identified using loss of resistance to saline.   No difficulty, paresthesia or occurrence of pain was encountered. Careful aspiration was negative for CSF and blood. A total of 1 cc Isovue 300 was injected yielding an epidurogram.    FLUOROSCOPY:  A fluoroscopy unit was utilized to obtain fluoroscopic images for intra-procedural use and assistance. Fluoroscopy was utilized to identify anatomic and radiographic landmarks for the accompanying procedure guidance and not for diagnostic purposes. After negative aspiration 4 cc of therapeutic injectate containing 1 cc of Depo-Medrol 80 mg/cc and 3 ml of lidocaine 1% was injected slowly in aliquots while clinically observing and monitoring the patient with negative aspiration demonstrated between aliquots of injections. At this time no paresthesia or occurrence of pain was present. The needle was then removed, the area was cleansed and a Band-Aid was placed over the injection site. There were no complications. The patient tolerated the procedure well. The procedure was performed using local anesthesia. The patient was transferred by wheelchair with accompaniment to the  Recovery Area and was monitored per protocol. The vital signs remained stable. The patient was discharged in stable condition accompanied by an escort with written instructions after fulfilling the standard discharge criteria. Written follow up instructions were given to the patient.     Estimated Blood Loss: 0ml    Plan:  Follow up in 6-8 weeks    Office: [phone]

## 2021-09-24 NOTE — H&P
Patient:  Zeeshan Garcia  YOB: 1960  Medical Record #:  [id number]   Place: [address] [place] [address]  Date:  2021   Physician:  Ulices Aviles MD    History Obtained From: electronic medical record    HISTORY OF PRESENT ILLNESS    Past Medical History:        Diagnosis Date    HBP (high blood pressure)     Hyperlipidemia     Obstructive sleep apnea on CPAP     Plantar fasciitis     Postoperative nausea     per patient lasted 3 weeks after knee replacement surgery    Seasonal allergies      Past Surgical History:        Procedure Laterality Date    CARPAL TUNNEL RELEASE Right      SECTION  8396,0167, 1986,      SECTION      x4    COLONOSCOPY  2018    Dr. Gianfranco Reyes with polyp    FOOT NEUROMA SURGERY Right     HAND SURGERY Left     Thumb joint    HYSTERECTOMY      LASIK      SHOULDER SURGERY      left    SPINAL FUSION      L4    TOTAL KNEE ARTHROPLASTY Left 2020    ROBOTIC ASSISTED LEFT TOTAL KNEE REPLACEMENT performed by Madelin Rivas MD at [place] [address] Right 2020    RIGHT TOTAL KNEE REPLACEMENT ROBOTIC ASSISTED performed by Madelin Rivas MD at [place] [address]     Medications Prior to Admission:   No current facility-administered medications on file prior to encounter.      Current Outpatient Medications on File Prior to Encounter   Medication Sig Dispense Refill    buPROPion (WELLBUTRIN XL) 150 MG extended release tablet TAKE ONE TABLET BY MOUTH EVERY MORNING 90 tablet 3    amitriptyline (ELAVIL) 25 MG tablet TAKE ONE TABLET BY MOUTH NIGHTLY 90 tablet 3    metoprolol tartrate (LOPRESSOR) 25 MG tablet Take 1 tablet by mouth 2 times daily 180 tablet 3    losartan-hydroCHLOROthiazide (HYZAAR) 100-12.5 MG per tablet TAKE 1 TABLET BY MOUTH ONCE DAILY 90 tablet 3    atorvastatin (LIPITOR) 80 MG tablet TAKE 1 TABLET BY MOUTH ONCE DAILY 90 tablet 3    fluticasone (FLONASE) 50 MCG/ACT nasal spray 1 spray by Nasal route daily as needed       cephALEXin (KEFLEX) 500 MG capsule 2 po 2 hours pre procedure and 2 po 2 hours post procedure 4 capsule 3     Allergies:  Hydrocodone-acetaminophen, Vicodin [hydrocodone-acetaminophen], Lisinopril, Sansert [methysergide], and Toradol [ketorolac tromethamine]  Social History     Socioeconomic History    Marital status:      Spouse name: Not on file    Number of children: Not on file    Years of education: Not on file    Highest education level: Not on file   Occupational History    Not on file   Tobacco Use    Smoking status: Never Smoker    Smokeless tobacco: Never Used   Vaping Use    Vaping Use: Never used   Substance and Sexual Activity    Alcohol use: Yes     Comment: monthly, occasional drink    Drug use: No    Sexual activity: Yes     Partners: Male     Comment: hysterectomy   Other Topics Concern    Not on file   Social History Narrative    Not on file     Social Determinants of Health     Financial Resource Strain:     Difficulty of Paying Living Expenses:    Food Insecurity:     Worried About Running Out of Food in the Last Year:     Ran Out of Food in the Last Year:    Transportation Needs:     Lack of Transportation (Medical):      Lack of Transportation (Non-Medical):    Physical Activity:     Days of Exercise per Week:     Minutes of Exercise per Session:    Stress:     Feeling of Stress :    Social Connections:     Frequency of Communication with Friends and Family:     Frequency of Social Gatherings with Friends and Family:     Attends Voodoo Services:     Active Member of Clubs or Organizations:     Attends Club or Organization Meetings:     Marital Status:    Intimate Partner Violence:     Fear of Current or Ex-Partner:     Emotionally Abused:     Physically Abused:     Sexually Abused:      Family History   Problem Relation Age of Onset    Arthritis Other     Asthma Other     Cancer Other     Diabetes Other     High Blood Pressure Other  Breast Cancer Mother     Other Mother          PHYSICAL EXAM:      BP (!) 120/43   Pulse 60   Temp 98.6 °F (37 °C) (Temporal)   Resp 14   Ht 5' 2\" (1.575 m)   Wt 147 lb (66.7 kg)   LMP  (LMP Unknown)   SpO2 100%   BMI 26.89 kg/m²  I            ASSESSMENT AND PLAN:    1. Procedure. options, risks and benefits reviewed with patient and expresses understanding.

## 2021-10-05 ENCOUNTER — TELEPHONE (OUTPATIENT)
Dept: FAMILY MEDICINE CLINIC | Age: 61
End: 2021-10-05

## 2021-10-05 NOTE — TELEPHONE ENCOUNTER
PT came into the office this morning thinking her appointment was today and not Friday. While in the office, PT had her Be Well Within form that she wanted to know if Dr. Jules Larose could fill out and sign off on. She will be back in on Friday to  at her scheduled appointment. Placed in basket on [employer] old desk.

## 2021-10-07 NOTE — PROGRESS NOTES
Weight: 144 lb (65.3 kg)   Height: 5' 2\" (1.575 m)     A&o  Neck no TMG no bruit  Car reg no MGR  Lungs cta  Ext no edema  Skin no jaundice  Eyes anicteric

## 2021-10-08 ENCOUNTER — OFFICE VISIT (OUTPATIENT)
Dept: FAMILY MEDICINE CLINIC | Age: 61
End: 2021-10-08
Payer: COMMERCIAL

## 2021-10-08 VITALS
HEIGHT: 62 IN | OXYGEN SATURATION: 98 % | WEIGHT: 144 LBS | RESPIRATION RATE: 14 BRPM | DIASTOLIC BLOOD PRESSURE: 64 MMHG | BODY MASS INDEX: 26.5 KG/M2 | HEART RATE: 94 BPM | SYSTOLIC BLOOD PRESSURE: 126 MMHG

## 2021-10-08 DIAGNOSIS — I10 ESSENTIAL HYPERTENSION: ICD-10-CM

## 2021-10-08 DIAGNOSIS — F34.1 DYSTHYMIA: ICD-10-CM

## 2021-10-08 DIAGNOSIS — Z23 NEEDS FLU SHOT: ICD-10-CM

## 2021-10-08 DIAGNOSIS — E78.2 MIXED HYPERLIPIDEMIA: Primary | ICD-10-CM

## 2021-10-08 LAB
A/G RATIO: 2.2 (ref 1.1–2.2)
ALBUMIN SERPL-MCNC: 4.6 G/DL (ref 3.4–5)
ALP BLD-CCNC: 68 U/L (ref 40–129)
ALT SERPL-CCNC: 31 U/L (ref 10–40)
ANION GAP SERPL CALCULATED.3IONS-SCNC: 12 MMOL/L (ref 3–16)
AST SERPL-CCNC: 22 U/L (ref 15–37)
BILIRUB SERPL-MCNC: 0.6 MG/DL (ref 0–1)
BUN BLDV-MCNC: 20 MG/DL (ref 7–20)
CALCIUM SERPL-MCNC: 9.3 MG/DL (ref 8.3–10.6)
CHLORIDE BLD-SCNC: 103 MMOL/L (ref 99–110)
CO2: 28 MMOL/L (ref 21–32)
CREAT SERPL-MCNC: 0.7 MG/DL (ref 0.6–1.2)
GFR AFRICAN AMERICAN: >60
GFR NON-AFRICAN AMERICAN: >60
GLOBULIN: 2.1 G/DL
GLUCOSE BLD-MCNC: 82 MG/DL (ref 70–99)
POTASSIUM SERPL-SCNC: 4 MMOL/L (ref 3.5–5.1)
SODIUM BLD-SCNC: 143 MMOL/L (ref 136–145)
TOTAL PROTEIN: 6.7 G/DL (ref 6.4–8.2)

## 2021-10-08 PROCEDURE — 99214 OFFICE O/P EST MOD 30 MIN: CPT | Performed by: FAMILY MEDICINE

## 2021-10-08 PROCEDURE — 90471 IMMUNIZATION ADMIN: CPT | Performed by: FAMILY MEDICINE

## 2021-10-08 PROCEDURE — 90674 CCIIV4 VAC NO PRSV 0.5 ML IM: CPT | Performed by: FAMILY MEDICINE

## 2021-10-08 PROCEDURE — 36415 COLL VENOUS BLD VENIPUNCTURE: CPT | Performed by: FAMILY MEDICINE

## 2021-10-08 SDOH — ECONOMIC STABILITY: FOOD INSECURITY: WITHIN THE PAST 12 MONTHS, YOU WORRIED THAT YOUR FOOD WOULD RUN OUT BEFORE YOU GOT MONEY TO BUY MORE.: NEVER TRUE

## 2021-10-08 SDOH — ECONOMIC STABILITY: FOOD INSECURITY: WITHIN THE PAST 12 MONTHS, THE FOOD YOU BOUGHT JUST DIDN'T LAST AND YOU DIDN'T HAVE MONEY TO GET MORE.: NEVER TRUE

## 2021-10-08 ASSESSMENT — SOCIAL DETERMINANTS OF HEALTH (SDOH): HOW HARD IS IT FOR YOU TO PAY FOR THE VERY BASICS LIKE FOOD, HOUSING, MEDICAL CARE, AND HEATING?: NOT HARD AT ALL

## 2021-10-11 ENCOUNTER — OFFICE VISIT (OUTPATIENT)
Dept: SLEEP MEDICINE | Age: 61
End: 2021-10-11
Payer: COMMERCIAL

## 2021-10-11 VITALS
HEART RATE: 57 BPM | HEIGHT: 62 IN | OXYGEN SATURATION: 98 % | DIASTOLIC BLOOD PRESSURE: 76 MMHG | SYSTOLIC BLOOD PRESSURE: 110 MMHG | TEMPERATURE: 95.3 F | RESPIRATION RATE: 16 BRPM | WEIGHT: 144 LBS | BODY MASS INDEX: 26.5 KG/M2

## 2021-10-11 DIAGNOSIS — Z99.89 DEPENDENCE ON OTHER ENABLING MACHINES AND DEVICES: ICD-10-CM

## 2021-10-11 DIAGNOSIS — Z99.89 OSA ON CPAP: Primary | ICD-10-CM

## 2021-10-11 DIAGNOSIS — G47.33 OSA ON CPAP: Primary | ICD-10-CM

## 2021-10-11 DIAGNOSIS — F51.04 CHRONIC INSOMNIA: ICD-10-CM

## 2021-10-11 PROCEDURE — 99214 OFFICE O/P EST MOD 30 MIN: CPT | Performed by: PSYCHIATRY & NEUROLOGY

## 2021-10-11 RX ORDER — DOXEPIN HYDROCHLORIDE 10 MG/1
10 CAPSULE ORAL NIGHTLY
Qty: 30 CAPSULE | Refills: 5 | Status: SHIPPED | OUTPATIENT
Start: 2021-10-11 | End: 2022-03-23

## 2021-10-11 ASSESSMENT — SLEEP AND FATIGUE QUESTIONNAIRES
HOW LIKELY ARE YOU TO NOD OFF OR FALL ASLEEP WHEN YOU ARE A PASSENGER IN A CAR FOR AN HOUR WITHOUT A BREAK: 1
HOW LIKELY ARE YOU TO NOD OFF OR FALL ASLEEP WHILE WATCHING TV: 0
HOW LIKELY ARE YOU TO NOD OFF OR FALL ASLEEP WHILE SITTING QUIETLY AFTER LUNCH WITHOUT ALCOHOL: 1
HOW LIKELY ARE YOU TO NOD OFF OR FALL ASLEEP WHILE LYING DOWN TO REST IN THE AFTERNOON WHEN CIRCUMSTANCES PERMIT: 3
ESS TOTAL SCORE: 8
HOW LIKELY ARE YOU TO NOD OFF OR FALL ASLEEP WHILE SITTING AND TALKING TO SOMEONE: 0
HOW LIKELY ARE YOU TO NOD OFF OR FALL ASLEEP WHILE SITTING AND READING: 3
HOW LIKELY ARE YOU TO NOD OFF OR FALL ASLEEP WHILE SITTING INACTIVE IN A PUBLIC PLACE: 0
HOW LIKELY ARE YOU TO NOD OFF OR FALL ASLEEP IN A CAR, WHILE STOPPED FOR A FEW MINUTES IN TRAFFIC: 0

## 2021-10-11 ASSESSMENT — ENCOUNTER SYMPTOMS: APNEA: 0

## 2021-10-11 NOTE — PROGRESS NOTES
MD JUAN DIEGO Rico Board Certified in Sleep Medicine  Certified in [address] [place] [address] Certified in Neurology [address]  [address] [facility] [address],  [place]apoleon Carter 66  [phone]   [address] [facility], [address] [place], [place] [address]e Ne                      [address]  [address] [place] [address] [phone]    Subjective:     Patient ID: Pari Chun is a 57 y.o. female. Chief Complaint   Patient presents with    Follow-up     yearly CPAP f/u        HPI:        Pari Chun is a 57 y.o. female was seen today as annual follow for  severe obstructive sleep apnea and EDS with apnea with apnea hypopnea index of 33/h with lowest O2 saturation of 79%. Patient is using the PAP machine about 100% of the time, more than 4 hours a nightabout  100 %, in total average of 8:10 hours a night in last 90 days. Currently on PAP at 12.6 cm (5-20), the AHI is only 2.4 events per hour at this pressure. Patient improved regarding daytime sleepiness and fatigue, wakes up refreshed in the morning. The Patient scored Total score: 8 on Bryan Sleepiness Scale ( more than 10 is indicative of daytime sleepiness)   Patient has no problem with PAP pressure or mask. Uses nasal mask. Uses Doxepin 2 nights a week.    DOT/CDL - N/A        Previous Report(s)Reviewed: historical medical records         Social History     Socioeconomic History    Marital status:      Spouse name: Not on file    Number of children: Not on file    Years of education: Not on file    Highest education level: Not on file   Occupational History    Not on file   Tobacco Use    Smoking status: Never Smoker    Smokeless tobacco: Never Used   Vaping Use    Vaping Use: Never used   Substance and Sexual Activity    Alcohol use: Yes     Comment: monthly, occasional drink    Drug use: No    Sexual activity: Yes     Partners: Male     Comment: hysterectomy   Other Topics Concern    Not on file   Social History Narrative    Not on file     Social Determinants of Health     Financial Resource Strain: Low Risk     Difficulty of Paying Living Expenses: Not hard at all   Food Insecurity: No Food Insecurity    Worried About Running Out of Food in the Last Year: Never true    [address] in the Last Year: Never true   Transportation Needs:     Lack of Transportation (Medical):  Lack of Transportation (Non-Medical):    Physical Activity:     Days of Exercise per Week:     Minutes of Exercise per Session:    Stress:     Feeling of Stress :    Social Connections:     Frequency of Communication with Friends and Family:     Frequency of Social Gatherings with Friends and Family:     Attends Oriental orthodox Services:     Active Member of Clubs or Organizations:     Attends Club or Organization Meetings:     Marital Status:    Intimate Partner Violence:     Fear of Current or Ex-Partner:     Emotionally Abused:     Physically Abused:     Sexually Abused:        Prior to Admission medications    Medication Sig Start Date End Date Taking?  Authorizing Provider   doxepin (SINEQUAN) 10 MG capsule Take 1 capsule by mouth nightly 10/11/21  Yes Khoa Ramos MD   buPROPion (WELLBUTRIN XL) 150 MG extended release tablet TAKE ONE TABLET BY MOUTH EVERY MORNING 8/31/21  Yes Tyler Mcfadden MD   amitriptyline (ELAVIL) 25 MG tablet TAKE ONE TABLET BY MOUTH NIGHTLY 12/8/20  Yes Tyler Mcfadden MD   losartan-hydroCHLOROthiazide [facility]) 100-12.5 MG per tablet TAKE 1 TABLET BY MOUTH ONCE DAILY 11/25/20  Yes Tyler Mcfadden MD   atorvastatin (LIPITOR) 80 MG tablet TAKE 1 TABLET BY MOUTH ONCE DAILY 11/25/20  Yes Tyler Mcfadden MD   fluticasone [facility]) 50 MCG/ACT nasal spray 1 spray by Nasal route daily as needed  3/23/16  Yes Historical Provider, MD   metoprolol tartrate (LOPRESSOR) 25 MG tablet Take 1 tablet by mouth 2 times daily 20  Trev Tinajero MD       Allergies as of 10/11/2021 - Fully Reviewed 10/11/2021   Allergen Reaction Noted    Hydrocodone-acetaminophen Shortness Of Breath 2006    Vicodin [hydrocodone-acetaminophen] Shortness Of Breath 2013    Lisinopril  2020    Sansert [methysergide]  2018    Toradol [ketorolac tromethamine] Nausea Only 2020       Patient Active Problem List   Diagnosis    Aia 16 DJD(carpometacarpal degenerative joint disease), localized primary    Fibromyalgia    Essential hypertension    Mixed hyperlipidemia    Dysthymia    Metatarsalgia of right foot    Status post right knee replacement    Left shoulder pain    Rotator cuff tendonitis, left    DAVID (obstructive sleep apnea)    Arthritis of right knee    DDD (degenerative disc disease), lumbar    Lumbar radicular pain    Arthritis of right hip    S/P lumbar fusion    Foraminal stenosis of lumbar region       Past Medical History:   Diagnosis Date    HBP (high blood pressure)     Hyperlipidemia     Obstructive sleep apnea on CPAP     Plantar fasciitis     Postoperative nausea     per patient lasted 3 weeks after knee replacement surgery    Seasonal allergies        Past Surgical History:   Procedure Laterality Date    CARPAL TUNNEL RELEASE Right    [facility] SECTION  [id number], ,      SECTION      x4    COLONOSCOPY  2018    Dr. Brian Drake with polyp    FOOT NEUROMA SURGERY Right     HAND SURGERY Left     Thumb joint    HYSTERECTOMY      LASIK      PAIN MANAGEMENT PROCEDURE Right 2021    LUMBAR EPIDURAL STEROID INJECTION - RIGHT L5-S1 performed by Joel Dhillon MD at [facility]      left    SPINAL FUSION      L4    TOTAL KNEE ARTHROPLASTY Left 2020    ROBOTIC ASSISTED LEFT TOTAL KNEE REPLACEMENT performed by Jordan Villa MD at [facility] Right 2020    RIGHT TOTAL KNEE REPLACEMENT ROBOTIC ASSISTED performed by Rose Palmer MD at [facility] History   Problem Relation Age of Onset    Arthritis Other     Asthma Other     Cancer Other     Diabetes Other     High Blood Pressure Other     Breast Cancer Mother     Other Mother        Review of Systems   Constitutional: Negative for fatigue. Respiratory: Negative for apnea. Cardiovascular: Negative for leg swelling. Genitourinary: Negative for frequency. Neurological: Negative for headaches. Objective:     Vitals:  Weight BMI Neck circumference    Wt Readings from Last 3 Encounters:   10/11/21 144 lb (65.3 kg)   10/08/21 144 lb (65.3 kg)   09/24/21 147 lb (66.7 kg)    Body mass index is 26.34 kg/m². BP HR SaO2   BP Readings from Last 3 Encounters:   10/11/21 110/76   10/08/21 126/64   09/24/21 139/66    Pulse Readings from Last 3 Encounters:   10/11/21 57   10/08/21 94   09/24/21 56    SpO2 Readings from Last 3 Encounters:   10/11/21 98%   10/08/21 98%   09/24/21 99%        Themandibular molar Class :   [x]1 []2 []3      Mallampati I Airway Classification:   []1 []2 []3 [x]4      Physical Exam  Vitals and nursing note reviewed. Constitutional:       Appearance: Normal appearance. HENT:      Head: Atraumatic. Nose: Nose normal.      Mouth/Throat:      Mouth: Mucous membranes are moist.   Cardiovascular:      Rate and Rhythm: Normal rate and regular rhythm. Pulmonary:      Effort: Pulmonary effort is normal.      Breath sounds: Normal breath sounds. Musculoskeletal:         General: Normal range of motion. Cervical back: Normal range of motion and neck supple. Skin:     General: Skin is warm. Psychiatric:         Mood and Affect: Mood normal.         :   Severe Obstructive Sleep Apnea/Hypopnea Syndrome under good control on PAP at 12.6 cmwp. Diagnosis Orders   1. DAVID on CPAP     2. Dependence on other enabling machines and devices     3.  Chronic insomnia

## 2021-10-11 NOTE — PROGRESS NOTES
reference to accepted standards of medicalpractice in treatment of this patients condition.     Cosme Ross MD      NPI: [id number]       Order Signed Date: 10/11/21    Electronically signed by Cosme Ross MD on 10/11/2021 at 8:37 AM

## 2021-10-20 NOTE — PROGRESS NOTES
[address] [facility] time____10/22/21 0730________        Surgery time__0830__________    Take the following medications with a sip of water:    Do not eat or drink anything after 12:00 midnight prior to your surgery. This includes water chewing gum, mints and ice chips. You may brush your teeth and gargle the morning of your surgery, but do not swallow the water     Please see your family doctor/pediatrician for a history and physical and/or concerning medications. Bring any test results/reports from your physicians office. If you are under the care of a heart doctor or specialist doctor, please be aware that you may be asked to them for clearance    You may be asked to stop blood thinners such as Coumadin, Plavix, Fragmin, Lovenox, etc., or any anti-inflammatories such as:  Aspirin, Ibuprofen, Advil, Naproxen prior to your surgery. We also ask that you stop any OTC medications such as fish oil, vitamin E, glucosamine, garlic, Multivitamins, COQ 10, etc.    We ask that you do not smoke 24 hours prior to surgery  We ask that you do not  drink any alcoholic beverages 24 hours prior to surgery     You must make arrangements for a responsible adult to take you home after your surgery. For your safety you will not be allowed to leave alone or drive yourself home. Your surgery will be cancelled if you do not have a ride home. Also for your safety, it is strongly suggested that someone stay with you the first 24 hours after your surgery. A parent or legal guardian must accompany a child scheduled for surgery and plan to stay at the hospital until the child is discharged. Please do not bring other children with you. For your comfort, please wear simple loose fitting clothing to the hospital.  Please do not bring valuables.     Do not wear any make-up or nail polish on your fingers or toes      For your safety, please do not wear any jewelry or body piercing's on the day of surgery. All jewelry must be removed. If you have dentures, they will be removed before going to operating room. For your convenience, we will provide you with a container. If you wear contact lenses or glasses, they will be removed, please bring a case for them. If you have a living will and a durable power of  for healthcare, please bring in a copy. As part of our patient safety program to minimize surgical site infections, we ask you to do the following:    · Please notify your surgeon if you develop any illness between         now and the  day of your surgery. · This includes a cough, cold, fever, sore throat, nausea,         or vomiting, and diarrhea, etc.  ·  Please notify your surgeon if you experience dizziness, shortness         of breath or blurred vision between now and the time of your surgery. Do not shave your operative site 96 hours prior to surgery. For face and neck surgery, men may use an electric razor 48 hours   prior to surgery. You may shower the night before surgery or the morning of   your surgery with an antibacterial soap. You will need to bring a photo ID and insurance card    [facility] has an onsite pharmacy, would you like to utilize our pharmacy     If you will be staying overnight and use a C-pap machine, please bring   your C-pap to hospital     Our goal is to provide you with excellent care, therefore, visitors will be limited to two(2) in the room at a time so that we may focus on providing this care for you. Please contact pre-admission testing if you have any further questions. [facility] phone number:  [phone]  Please note these are generalized instructions for all surgical cases, you may be provided with more specific instructions according to your surgery.

## 2021-10-22 ENCOUNTER — HOSPITAL ENCOUNTER (OUTPATIENT)
Age: 61
Setting detail: OUTPATIENT SURGERY
Discharge: HOME | End: 2021-10-22
Attending: ANESTHESIOLOGY | Admitting: ANESTHESIOLOGY
Payer: COMMERCIAL

## 2021-10-22 ENCOUNTER — APPOINTMENT (OUTPATIENT)
Dept: INTERVENTIONAL RADIOLOGY/VASCULAR | Age: 61
End: 2021-10-22
Attending: ANESTHESIOLOGY
Payer: COMMERCIAL

## 2021-10-22 VITALS
BODY MASS INDEX: 26.68 KG/M2 | SYSTOLIC BLOOD PRESSURE: 138 MMHG | HEIGHT: 62 IN | DIASTOLIC BLOOD PRESSURE: 79 MMHG | WEIGHT: 145 LBS | OXYGEN SATURATION: 99 % | TEMPERATURE: 97.3 F | RESPIRATION RATE: 16 BRPM | HEART RATE: 50 BPM

## 2021-10-22 PROCEDURE — 3610000054 HC PAIN LEVEL 3 BASE (NON-OR): Performed by: ANESTHESIOLOGY

## 2021-10-22 PROCEDURE — 2709999900 HC NON-CHARGEABLE SUPPLY: Performed by: ANESTHESIOLOGY

## 2021-10-22 PROCEDURE — 2500000003 HC RX 250 WO HCPCS: Performed by: ANESTHESIOLOGY

## 2021-10-22 PROCEDURE — 6360000002 HC RX W HCPCS: Performed by: ANESTHESIOLOGY

## 2021-10-22 RX ORDER — LIDOCAINE HYDROCHLORIDE 10 MG/ML
INJECTION, SOLUTION EPIDURAL; INFILTRATION; INTRACAUDAL; PERINEURAL
Status: COMPLETED | OUTPATIENT
Start: 2021-10-22 | End: 2021-10-22

## 2021-10-22 RX ORDER — BUPIVACAINE HYDROCHLORIDE 5 MG/ML
INJECTION, SOLUTION EPIDURAL; INTRACAUDAL
Status: COMPLETED | OUTPATIENT
Start: 2021-10-22 | End: 2021-10-22

## 2021-10-22 RX ORDER — METHYLPREDNISOLONE ACETATE 80 MG/ML
INJECTION, SUSPENSION INTRA-ARTICULAR; INTRALESIONAL; INTRAMUSCULAR; SOFT TISSUE
Status: COMPLETED | OUTPATIENT
Start: 2021-10-22 | End: 2021-10-22

## 2021-10-22 ASSESSMENT — PAIN DESCRIPTION - ORIENTATION: ORIENTATION: RIGHT

## 2021-10-22 ASSESSMENT — PAIN DESCRIPTION - DESCRIPTORS: DESCRIPTORS: SHOOTING

## 2021-10-22 ASSESSMENT — PAIN DESCRIPTION - PAIN TYPE: TYPE: CHRONIC PAIN

## 2021-10-22 ASSESSMENT — PAIN SCALES - GENERAL
PAINLEVEL_OUTOF10: 2
PAINLEVEL_OUTOF10: 2

## 2021-10-22 ASSESSMENT — PAIN DESCRIPTION - FREQUENCY: FREQUENCY: CONTINUOUS

## 2021-10-22 ASSESSMENT — PAIN DESCRIPTION - LOCATION: LOCATION: BUTTOCKS;BACK

## 2021-10-22 NOTE — H&P
(HYZAAR) 100-12.5 MG per tablet TAKE 1 TABLET BY MOUTH ONCE DAILY 90 tablet 3    atorvastatin (LIPITOR) 80 MG tablet TAKE 1 TABLET BY MOUTH ONCE DAILY 90 tablet 3    fluticasone (FLONASE) 50 MCG/ACT nasal spray 1 spray by Nasal route daily as needed        Allergies:  Hydrocodone-acetaminophen, Vicodin [hydrocodone-acetaminophen], Lisinopril, Sansert [methysergide], and Toradol [ketorolac tromethamine]  Social History     Socioeconomic History    Marital status:      Spouse name: Not on file    Number of children: Not on file    Years of education: Not on file    Highest education level: Not on file   Occupational History    Not on file   Tobacco Use    Smoking status: Never Smoker    Smokeless tobacco: Never Used   Vaping Use    Vaping Use: Never used   Substance and Sexual Activity    Alcohol use: Yes     Comment: monthly, occasional drink    Drug use: No    Sexual activity: Yes     Partners: Male     Comment: hysterectomy   Other Topics Concern    Not on file   Social History Narrative    Not on file     Social Determinants of Health     Financial Resource Strain: Low Risk     Difficulty of Paying Living Expenses: Not hard at all   Food Insecurity: No Food Insecurity    Worried About Running Out of Food in the Last Year: Never true    Cristo of Food in the Last Year: Never true   Transportation Needs:     Lack of Transportation (Medical):      Lack of Transportation (Non-Medical):    Physical Activity:     Days of Exercise per Week:     Minutes of Exercise per Session:    Stress:     Feeling of Stress :    Social Connections:     Frequency of Communication with Friends and Family:     Frequency of Social Gatherings with Friends and Family:     Attends Yazidism Services:     Active Member of Clubs or Organizations:     Attends Club or Organization Meetings:     Marital Status:    Intimate Partner Violence:     Fear of Current or Ex-Partner:     Emotionally Abused:     Physically Abused:     Sexually Abused:      Family History   Problem Relation Age of Onset    Arthritis Other     Asthma Other     Cancer Other     Diabetes Other     High Blood Pressure Other     Breast Cancer Mother     Other Mother          PHYSICAL EXAM:      Ht 5' 2\" (1.575 m)   Wt 144 lb (65.3 kg)   LMP  (LMP Unknown)   BMI 26.34 kg/m²  I            ASSESSMENT AND PLAN:    1. Procedure. options, risks and benefits reviewed with patient and expresses understanding.

## 2021-10-22 NOTE — OP NOTE
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 BRIEF HISTORY:  The patient presents today to [facility] for a scheduled sciatic nerve block procedure. The patient was re-evaluated today and is clinically unchanged as compared to my previous evaluation. The patient is clinically stable to proceed with the procedure. PROCEDURE NOTE:  The procedure was again explained to the patient and the previously distributed pre-procedure literature was reviewed. The options, rationale, and benefits of the procedure including pain relief, functional improvement, and increased mobility, as well as the risks of the procedure including but not limited to infection, bleeding, paresthesia, pain, failure to relieve pain, increased pain, headache, allergic reaction, neurologic impairment, local anesthetic, toxicity, and side effects and the potential side effects of corticosteroids were discussed with the patient and informed written consent was obtained from the patient. The patient was brought to the fluoroscopy suite and placed in the prone position. The right sacral and gluteal area was prepped in the usual sterile fashion with Chloraprep and then draped. Intermittent AP fluoroscopy was utilized to localize the radiographic landmarks. A standard perisciatic nerve block approach was used. The sciatic notch, at its superolateral border was localized at its junction with the ilium rostral to the acetabulum and lateral to the SI joint and lateral to the sciatic nerve.  It was localized at the radiographic marker of the overlying sciatic nerve and piriformis muscle obetween the lateral aspect of the sacrum and the greater trochanter. The superficial skin projection was anesthetized with buffered lidocaine 1% 2 cc using a 27-gauge needle. A spinal needle was then inserted slowly under direct intermittent AP fluoroscopy towards the ilium and the overlying piriformis muscle. Negative aspiration was re-demonstrated and therapeutic injectate consisting of 6 cc of lidocaine 1%, 1 cc of bupivacaine 0.5% and 1 cc of Depo-Medrol 40 mg/cc was injected without pain, paresthesia, difficulty, or complaints. The needle was removed, the area cleansed, a Band-Aid placed over the injection site. Patient tolerated the procedure well. There were no complications. The patient was transferred, by wheelchair or stretcher, with accompaniment to the recovery area where the vital signs remained stable. There was sensory or motor blockade in the lower extremity. This procedure was done using local anesthesia only. The patient was discharged in stable condition accompanied with an escort after fulfilling standard discharge criteria. FLUOROSCOPY:  A fluoroscopy unit was utilized to obtain fluoroscopic images for intra-procedural use and assistance. Fluoroscopy was utilized to identify anatomic and radiographic landmarks for the accompanying procedure guidance and not for diagnostic purposes.       Estimated Blood Loss: 0ml      Plan:  Follow up in 4-6 weeks      Office: [phone]

## 2021-10-22 NOTE — PROGRESS NOTES
Patient verbalized understanding of discharge instructions, medication, and potential complications including pain. Patient instructed to call Doctor if complications occur. [FreeTextEntry1] : Ear hygiene reviewed in detail.  Follow up recommended if symptoms persist or progresses.  Routine follow up for cerumen management suggested.\par \par We discussed the risks of an office visit at this time during the Covid Pandemic. I have recommended that the patient followup with me if  health significantly changes.

## 2021-10-30 ENCOUNTER — TELEPHONE (OUTPATIENT)
Dept: FAMILY MEDICINE CLINIC | Age: 61
End: 2021-10-30

## 2021-10-30 RX ORDER — AMOXICILLIN AND CLAVULANATE POTASSIUM 875; 125 MG/1; MG/1
1 TABLET, FILM COATED ORAL 2 TIMES DAILY
Qty: 14 TABLET | Refills: 0 | Status: SHIPPED | OUTPATIENT
Start: 2021-10-30 | End: 2021-11-06

## 2021-10-30 RX ORDER — BENZONATATE 100 MG/1
100 CAPSULE ORAL 2 TIMES DAILY PRN
Qty: 20 CAPSULE | Refills: 0 | Status: SHIPPED | OUTPATIENT
Start: 2021-10-30 | End: 2021-11-06

## 2021-11-01 NOTE — TELEPHONE ENCOUNTER
Late entry. Received call over the weekend. Patient complaining of congestion, slight fever and not feeling well. Advised patient she should get Covid testing done. She declined saying \"what's the point, they will wait too long to treat you and you will just die\". She requested antibiotic for sinus infection and tessalon pearls. Prescriptions sent in but still highly encouraged patient to get covid testing done.

## 2021-11-11 ENCOUNTER — TELEPHONE (OUTPATIENT)
Dept: PULMONOLOGY | Age: 61
End: 2021-11-11

## 2021-11-11 NOTE — TELEPHONE ENCOUNTER
Left message for Lisandro Caruso. She had a PSG at [facility] earlier this year and to get those results per A[facility]. Lisandro Caruso needs to call and give a verbal to release or fax release from A1. I also called and left message for [facility] sleep to fax over results. Once we receive PSG results please fax over to [facility] as everything else has been received.

## 2021-12-23 RX ORDER — AMITRIPTYLINE HYDROCHLORIDE 25 MG/1
25 TABLET, FILM COATED ORAL NIGHTLY
Qty: 90 TABLET | Refills: 3 | Status: SHIPPED | OUTPATIENT
Start: 2021-12-23

## 2021-12-23 RX ORDER — CYCLOBENZAPRINE HCL 5 MG
5 TABLET ORAL NIGHTLY PRN
Qty: 90 TABLET | Refills: 3 | Status: SHIPPED | OUTPATIENT
Start: 2021-12-23

## 2021-12-23 RX ORDER — LOSARTAN POTASSIUM AND HYDROCHLOROTHIAZIDE 12.5; 1 MG/1; MG/1
TABLET ORAL
Qty: 90 TABLET | Refills: 3 | Status: SHIPPED | OUTPATIENT
Start: 2021-12-23

## 2021-12-23 NOTE — TELEPHONE ENCOUNTER
Pt is needing these medication filled today before 3 pm. She states that the pharm will not be open again until after the allyssa. Please send ASAP.

## 2021-12-23 NOTE — TELEPHONE ENCOUNTER
Medication:   Requested Prescriptions     Pending Prescriptions Disp Refills    cyclobenzaprine (FLEXERIL) 5 MG tablet [Pharmacy Med Name: CYCLOBENZAPRINE HCL 5MG TABS] 90 tablet 3     Sig: TAKE ONE TABLET BY MOUTH EVERY EVENING    losartan-hydroCHLOROthiazide (HYZAAR) 100-12.5 MG per tablet [Pharmacy Med Name: Kailey Peeling 100-12.5 TABS] 90 tablet 3     Sig: TAKE 1 TABLET BY MOUTH ONE TIME A DAY    amitriptyline (ELAVIL) 25 MG tablet [Pharmacy Med Name: AMITRIPTYLINE HCL 25MG TABS] 90 tablet 0     Sig: TAKE ONE TABLET BY MOUTH NIGHTLY       Last Filled:      Patient Phone Number: [phone] (home) [phone] (work)    Last appt: 10/8/2021   Next appt: Visit date not found    Last Labs DM:   Lab Results   Component Value Date    LABA1C 5.6 11/16/2020     Last Lipid:   Lab Results   Component Value Date    CHOL 173 04/16/2021    TRIG 135 04/16/2021    HDL 57 04/16/2021    [address] 89 04/16/2021     Last PSA: No results found for: PSA  Last Thyroid: No results found for: TSH, FT3, Z2RBQLG, T4FREE, D4KWQOG

## 2022-01-21 ENCOUNTER — TELEPHONE (OUTPATIENT)
Dept: FAMILY MEDICINE CLINIC | Age: 62
End: 2022-01-21

## 2022-01-21 NOTE — TELEPHONE ENCOUNTER
Spoke with pt regarding her eye and she stated she will not be going back to the eye dr she was hoping you would call her something in for her eye

## 2022-01-21 NOTE — TELEPHONE ENCOUNTER
If the jackiee has not improved with eye doctor prescription, she will need to follow-up with the eye doctor to see what next step would be in treatment

## 2022-01-21 NOTE — TELEPHONE ENCOUNTER
Pt is calling because she has pink eye diagnosed by eye doc. They advised her that it would go away after 10 days. She states that she is through the 10 days but its not getting better. She is asking for something called in to help. Please advise.      [address] [place] [address] Drive [address] [place] [address], [address] [place] [address] [phone] Phone:  [phone]   Fax:  [phone]

## 2022-01-25 NOTE — PROGRESS NOTES
2022    Yariel Leija (:  1960) is a 57 y.o. female, here for evaluation of the following chief complaint(s): Conjunctivitis (bilateral, Rt worse, ongoing 1 month )      ASSESSMENT/PLAN:     Diagnosis Orders   1. Conjunctivitis, unspecified conjunctivitis type, unspecified laterality      she was lasered years ago dry eyes on restasis; sees eye doctor (2); Chronic grtty eye feeeling for 4 weeks \"like pink eye\"   2. DDD (degenerative disc disease), lumbar      we discuss dietary approach low grain       No follow-ups on file. An electronic signature was used to authenticate this note. @SIG@    SUBJECTIVE/OBJECTIVE:  (NOTE : prior results listed below reviewed at this visit to assist in medical decision making.)    HPI / ROS    # acute for sev days pink eye concerns because this has cr going on  for 4 weeks. # she has aches and pains and attributes to poor diet sugar makes worse.           Wt Readings from Last 3 Encounters:   22 152 lb (68.9 kg)   10/22/21 145 lb (65.8 kg)   10/11/21 144 lb (65.3 kg)       BP Readings from Last 3 Encounters:   22 138/78   10/22/21 138/79   10/11/21 110/76       PHYSICAL EXAM  Vitals:    22 1111   BP: 138/78   Site: Left Upper Arm   Position: Sitting   Cuff Size: Large Adult   Pulse: 69   Resp: 16   SpO2: 97%   Weight: 152 lb (68.9 kg)   Height: 5' 2\" (1.575 m)     A&o  Eyes sclerae clear today white but worse in AM gunky glued shut also PM per pt

## 2022-01-26 ENCOUNTER — OFFICE VISIT (OUTPATIENT)
Dept: FAMILY MEDICINE CLINIC | Age: 62
End: 2022-01-26
Payer: COMMERCIAL

## 2022-01-26 VITALS
HEIGHT: 62 IN | DIASTOLIC BLOOD PRESSURE: 78 MMHG | OXYGEN SATURATION: 97 % | SYSTOLIC BLOOD PRESSURE: 138 MMHG | BODY MASS INDEX: 27.97 KG/M2 | WEIGHT: 152 LBS | HEART RATE: 69 BPM | RESPIRATION RATE: 16 BRPM

## 2022-01-26 DIAGNOSIS — M51.36 DDD (DEGENERATIVE DISC DISEASE), LUMBAR: ICD-10-CM

## 2022-01-26 DIAGNOSIS — H10.9 CONJUNCTIVITIS, UNSPECIFIED CONJUNCTIVITIS TYPE, UNSPECIFIED LATERALITY: Primary | ICD-10-CM

## 2022-01-26 PROCEDURE — 99213 OFFICE O/P EST LOW 20 MIN: CPT | Performed by: FAMILY MEDICINE

## 2022-01-26 RX ORDER — GENTAMICIN SULFATE 3 MG/ML
2 SOLUTION/ DROPS OPHTHALMIC 3 TIMES DAILY
Qty: 1 EACH | Refills: 0 | Status: SHIPPED | OUTPATIENT
Start: 2022-01-26 | End: 2022-01-30

## 2022-01-26 ASSESSMENT — PATIENT HEALTH QUESTIONNAIRE - PHQ9
9. THOUGHTS THAT YOU WOULD BE BETTER OFF DEAD, OR OF HURTING YOURSELF: 0
10. IF YOU CHECKED OFF ANY PROBLEMS, HOW DIFFICULT HAVE THESE PROBLEMS MADE IT FOR YOU TO DO YOUR WORK, TAKE CARE OF THINGS AT HOME, OR GET ALONG WITH OTHER PEOPLE: 0
SUM OF ALL RESPONSES TO PHQ QUESTIONS 1-9: 0
6. FEELING BAD ABOUT YOURSELF - OR THAT YOU ARE A FAILURE OR HAVE LET YOURSELF OR YOUR FAMILY DOWN: 0
2. FEELING DOWN, DEPRESSED OR HOPELESS: 0
4. FEELING TIRED OR HAVING LITTLE ENERGY: 0
1. LITTLE INTEREST OR PLEASURE IN DOING THINGS: 0
7. TROUBLE CONCENTRATING ON THINGS, SUCH AS READING THE NEWSPAPER OR WATCHING TELEVISION: 0
SUM OF ALL RESPONSES TO PHQ9 QUESTIONS 1 & 2: 0
8. MOVING OR SPEAKING SO SLOWLY THAT OTHER PEOPLE COULD HAVE NOTICED. OR THE OPPOSITE, BEING SO FIGETY OR RESTLESS THAT YOU HAVE BEEN MOVING AROUND A LOT MORE THAN USUAL: 0
5. POOR APPETITE OR OVEREATING: 0
3. TROUBLE FALLING OR STAYING ASLEEP: 0

## 2022-01-26 NOTE — PATIENT INSTRUCTIONS
Low Carb Eating with Intermittent Fasting    target < 100 grams of carb daily; ZERO grained based carbs  Get only carbs from whole fruits - strawberries, raspberries, blackberries, apples, oranges, pineapples, bananas etc.    Intermittent Fasting (\"I. F. \") / Eating Window   Only eat between noon and 8 PM  Water any time  Coffee with cream and no more than 1 teaspoon sugar OK in AM    Google/ YouTube AUTOPHAGY - a primary benefit of IF    Other helpful books and websites  1) Wheat Belly by Anjel Hill MD (www.iAcademic. Hypecal)  2) The Primal Blueprint by Isabel Juarez (www.MMRGlobal - review success stories)  2) Living Paleo For Dummies

## 2022-03-25 ENCOUNTER — APPOINTMENT (OUTPATIENT)
Dept: INTERVENTIONAL RADIOLOGY/VASCULAR | Age: 62
End: 2022-03-25
Attending: ANESTHESIOLOGY
Payer: COMMERCIAL

## 2022-03-25 ENCOUNTER — HOSPITAL ENCOUNTER (OUTPATIENT)
Age: 62
Setting detail: OUTPATIENT SURGERY
Discharge: HOME | End: 2022-03-25
Attending: ANESTHESIOLOGY | Admitting: ANESTHESIOLOGY
Payer: COMMERCIAL

## 2022-03-25 VITALS
BODY MASS INDEX: 27.97 KG/M2 | WEIGHT: 152 LBS | TEMPERATURE: 97.2 F | RESPIRATION RATE: 17 BRPM | SYSTOLIC BLOOD PRESSURE: 163 MMHG | DIASTOLIC BLOOD PRESSURE: 83 MMHG | OXYGEN SATURATION: 98 % | HEIGHT: 62 IN | HEART RATE: 73 BPM

## 2022-03-25 PROCEDURE — 6360000002 HC RX W HCPCS: Performed by: ANESTHESIOLOGY

## 2022-03-25 PROCEDURE — 2709999900 HC NON-CHARGEABLE SUPPLY: Performed by: ANESTHESIOLOGY

## 2022-03-25 PROCEDURE — 6360000004 HC RX CONTRAST MEDICATION: Performed by: ANESTHESIOLOGY

## 2022-03-25 PROCEDURE — 3610000054 HC PAIN LEVEL 3 BASE (NON-OR): Performed by: ANESTHESIOLOGY

## 2022-03-25 RX ORDER — METHYLPREDNISOLONE ACETATE 80 MG/ML
INJECTION, SUSPENSION INTRA-ARTICULAR; INTRALESIONAL; INTRAMUSCULAR; SOFT TISSUE
Status: COMPLETED | OUTPATIENT
Start: 2022-03-25 | End: 2022-03-25

## 2022-03-25 ASSESSMENT — PAIN - FUNCTIONAL ASSESSMENT: PAIN_FUNCTIONAL_ASSESSMENT: 0-10

## 2022-03-25 ASSESSMENT — PAIN SCALES - GENERAL
PAINLEVEL_OUTOF10: 0
PAINLEVEL_OUTOF10: 0

## 2022-03-25 NOTE — OP NOTE
Patient:  Wilfrido Gonzalez  YOB: 1960  Medical Record #:  [id number]   Place: [address] [facility]  Date:  3/25/2022   Physician:  Zita Pizano MD      PRE-PROCEDURE DIAGNOSIS: M54.16    POST-PROCEDURE DIAGNOSIS: M54.16    PROCEDURE:  Midline interlaminar right  L4-5 epidural steroid injection with fluoroscopy and epidurography. BRIEF HISTORY:  The patient presents today to [facility] for a scheduled lumbar epidural steroid injection procedure. The patient was re-evaluated today and is clinically unchanged as compared to my previous evaluation. The patient is clinically stable to proceed with the procedure. PROCEDURE NOTE:  The procedure was again explained to the patient and the previously distributed pre-procedure literature was reviewed. The options, rationale, and benefits of the procedure including pain relief, functional improvement, and increased mobility, as well as the risks of the procedure including but not limited to infection, bleeding, paresthesia, pain, failure to relieve pain, increased pain, headache, allergic reaction, neurologic impairment, local anesthetic, toxicity, and side effects and the potential side effects of corticosteroids were discussed with the patient and informed written consent was obtained from the patient. The patient was positioned in the prone position on the fluoroscopy table. The skin overlying the lumbosacral vertebrae was prepped using Chloraprep and draped in the usual sterile fashion. The L4-5 lumbar intervertebral level was identified using intermittent AP fluoroscopy. The previously identified projection of overlying skin was anesthetized using 2 cc of buffered 1% lidocaine with a 27 gauge needle. A 3.5\" 22g Touhy needle was advanced through a small skin nick in the AP view towards the interlaminar and epidural space. The epidural space was easily identified using loss of resistance to saline.   No difficulty, paresthesia or occurrence of pain was encountered. Careful aspiration was negative for CSF and blood. A total of 1 cc Isovue 300 was injected yielding an epidurogram.    FLUOROSCOPY:  A fluoroscopy unit was utilized to obtain fluoroscopic images for intra-procedural use and assistance. Fluoroscopy was utilized to identify anatomic and radiographic landmarks for the accompanying procedure guidance and not for diagnostic purposes. After negative aspiration 4 cc of therapeutic injectate containing 1 cc of Depo-Medrol 80 mg/cc and 3 ml of lidocaine 1% was injected slowly in aliquots while clinically observing and monitoring the patient with negative aspiration demonstrated between aliquots of injections. At this time no paresthesia or occurrence of pain was present. The needle was then removed, the area was cleansed and a Band-Aid was placed over the injection site. There were no complications. The patient tolerated the procedure well. The procedure was performed using local anesthesia. The patient was transferred by wheelchair with accompaniment to the  Recovery Area and was monitored per protocol. The vital signs remained stable. The patient was discharged in stable condition accompanied by an escort with written instructions after fulfilling the standard discharge criteria. Written follow up instructions were given to the patient. Estimated Blood Loss: 0ml    Plan:  Follow up in 6-8 weeks     Modifier 22: Procedure took >75% more time than a typical procedure secondary to Severe Degenerative Changes (M51.36), making visualization and needle placement more difficult.         Office: [phone]

## 2022-03-25 NOTE — H&P
Patient:  Mamta Ramsey  YOB: 1960  Medical Record #:  [id number]   Place: [address] [facility]  Date:  3/25/2022   Physician:  Sammi Simpson MD    History Obtained From: electronic medical record    HISTORY OF PRESENT ILLNESS    Past Medical History:        Diagnosis Date    HBP (high blood pressure)     Hyperlipidemia     Obstructive sleep apnea on CPAP     Plantar fasciitis     Postoperative nausea     per patient lasted 3 weeks after knee replacement surgery    Seasonal allergies      Past Surgical History:        Procedure Laterality Date    CARPAL TUNNEL RELEASE Right    [facility] SECTION  [id number], ,      SECTION      x4    COLONOSCOPY  2018    Dr. Vela Mitts with polyp    FOOT NEUROMA SURGERY Right     HAND SURGERY Left     Thumb joint    HYSTERECTOMY      JOINT REPLACEMENT Bilateral     LASIK      NERVE BLOCK Right 10/22/2021    RIGHT SCIATIC NERVE BLOCK performed by Marylen Feathers, MD at [address] [facility] Right 2021    LUMBAR EPIDURAL STEROID INJECTION - RIGHT L5-S1 performed by Marylen Feathers, MD at [facility]      left    SPINAL FUSION      L4    TOTAL KNEE ARTHROPLASTY Left 2020    ROBOTIC ASSISTED LEFT TOTAL KNEE REPLACEMENT performed by Adam Lynn MD at [place] [address] Right 2020    RIGHT TOTAL KNEE REPLACEMENT ROBOTIC ASSISTED performed by Adam Lynn MD at [place] [address]     Medications Prior to Admission:   No current facility-administered medications on file prior to encounter.      Current Outpatient Medications on File Prior to Encounter   Medication Sig Dispense Refill    cyclobenzaprine (FLEXERIL) 5 MG tablet Take 1 tablet by mouth nightly as needed for Muscle spasms 90 tablet 3    losartan-hydroCHLOROthiazide (HYZAAR) 100-12.5 MG per tablet TAKE 1 TABLET BY MOUTH ONE TIME A DAY 90 tablet 3    amitriptyline (ELAVIL) 25 MG tablet Take 1 tablet by mouth nightly 90 tablet 3    buPROPion (WELLBUTRIN XL) 150 MG extended release tablet TAKE ONE TABLET BY MOUTH EVERY MORNING 90 tablet 3    metoprolol tartrate (LOPRESSOR) 25 MG tablet Take 1 tablet by mouth 2 times daily 180 tablet 3    atorvastatin (LIPITOR) 80 MG tablet TAKE 1 TABLET BY MOUTH ONCE DAILY 90 tablet 3    fluticasone (FLONASE) 50 MCG/ACT nasal spray 1 spray by Nasal route daily as needed        Allergies:  Vicodin [hydrocodone-acetaminophen], Lisinopril, Sansert [methysergide], and Toradol [ketorolac tromethamine]  Social History     Socioeconomic History    Marital status:      Spouse name: Not on file    Number of children: Not on file    Years of education: Not on file    Highest education level: Not on file   Occupational History    Not on file   Tobacco Use    Smoking status: Never Smoker    Smokeless tobacco: Never Used   Vaping Use    Vaping Use: Never used   Substance and Sexual Activity    Alcohol use: Yes     Comment: monthly, occasional drink    Drug use: No    Sexual activity: Yes     Partners: Male     Comment: hysterectomy   Other Topics Concern    Not on file   Social History Narrative    Not on file     Social Determinants of Health     Financial Resource Strain: Low Risk     Difficulty of Paying Living Expenses: Not hard at all   Food Insecurity: No Food Insecurity    Worried About Running Out of Food in the Last Year: Never true    Cristo of Food in the Last Year: Never true   Transportation Needs:     Lack of Transportation (Medical): Not on file    Lack of Transportation (Non-Medical):  Not on file   Physical Activity:     Days of Exercise per Week: Not on file    Minutes of Exercise per Session: Not on file   Stress:     Feeling of Stress : Not on file   Social Connections:     Frequency of Communication with Friends and Family: Not on file    Frequency of Social Gatherings with Friends and Family: Not on file   [employer] Attends Evangelical Services: Not on file    Active Member of Clubs or Organizations: Not on file    Attends Club or Organization Meetings: Not on file    Marital Status: Not on file   Intimate Partner Violence:     Fear of Current or Ex-Partner: Not on file    Emotionally Abused: Not on file    Physically Abused: Not on file    Sexually Abused: Not on file   Housing Stability:     Unable to Pay for Housing in the Last Year: Not on file    Number of [place] in the Last Year: Not on file    Unstable Housing in the Last Year: Not on file     Family History   Problem Relation Age of Onset    Arthritis Other     Asthma Other     Cancer Other     Diabetes Other     High Blood Pressure Other     Breast Cancer Mother     Other Mother          PHYSICAL EXAM:      Ht 5' 2\" (1.575 m)   Wt 152 lb (68.9 kg)   LMP  (LMP Unknown)   BMI 27.80 kg/m²  I            ASSESSMENT AND PLAN:    1. Procedure. options, risks and benefits reviewed with patient and expresses understanding.

## 2022-04-06 NOTE — PROGRESS NOTES
[facility]  PRE-PROCEDURE INSTRUCTIONS    Procedure date_4/8/22________  Arrival time__0830__________          Procedure time_0930___________       It is not necessary to stop eating or drinking prior to this procedure. We would like you to take your medications for blood pressure as usual.  You may be asked to stop blood thinners such as Coumadin, Plavix, Fragmin, Lovenox, etc., or any anti-inflammatories such as:  Aspirin, Ibuprofen, Advil, Naproxen prior to your procedure. We also ask that you stop any OTC medications that cause additional bleeding    You must make arrangements for a responsible adult to arrive with you and stay in our waiting area during your procedure. They will also need to take you home after your procedure. For your safety you will not be allowed to leave alone or drive yourself home. Also for your safety, it is strongly suggested that someone stay with you the first 24 hours after your procedure. For your comfort, please wear simple loose fitting clothing to the center. Please do not bring valuables. If you have a living will and a durable power of  for healthcare, please bring in a copy.     You will need to bring a photo ID and insurance card    Our goal is to provide you with excellent care so if you have any questions, please contact us at the [facility] at [phone]         Please note these are generalized instructions for all EGD cases, you may be provided with more specific instructions if necessary

## 2022-04-08 ENCOUNTER — APPOINTMENT (OUTPATIENT)
Dept: INTERVENTIONAL RADIOLOGY/VASCULAR | Age: 62
End: 2022-04-08
Attending: ANESTHESIOLOGY
Payer: COMMERCIAL

## 2022-04-08 ENCOUNTER — HOSPITAL ENCOUNTER (OUTPATIENT)
Age: 62
Setting detail: OUTPATIENT SURGERY
Discharge: HOME | End: 2022-04-08
Attending: ANESTHESIOLOGY | Admitting: ANESTHESIOLOGY
Payer: COMMERCIAL

## 2022-04-08 VITALS
BODY MASS INDEX: 27.38 KG/M2 | WEIGHT: 148.8 LBS | TEMPERATURE: 97.1 F | OXYGEN SATURATION: 97 % | HEART RATE: 90 BPM | RESPIRATION RATE: 16 BRPM | DIASTOLIC BLOOD PRESSURE: 74 MMHG | SYSTOLIC BLOOD PRESSURE: 142 MMHG | HEIGHT: 62 IN

## 2022-04-08 PROCEDURE — 2500000003 HC RX 250 WO HCPCS: Performed by: ANESTHESIOLOGY

## 2022-04-08 PROCEDURE — 3610000054 HC PAIN LEVEL 3 BASE (NON-OR): Performed by: ANESTHESIOLOGY

## 2022-04-08 PROCEDURE — 2709999900 HC NON-CHARGEABLE SUPPLY: Performed by: ANESTHESIOLOGY

## 2022-04-08 PROCEDURE — 6360000002 HC RX W HCPCS: Performed by: ANESTHESIOLOGY

## 2022-04-08 RX ORDER — BUPIVACAINE HYDROCHLORIDE 5 MG/ML
INJECTION, SOLUTION EPIDURAL; INTRACAUDAL
Status: COMPLETED | OUTPATIENT
Start: 2022-04-08 | End: 2022-04-08

## 2022-04-08 RX ORDER — LIDOCAINE HYDROCHLORIDE 10 MG/ML
INJECTION, SOLUTION EPIDURAL; INFILTRATION; INTRACAUDAL; PERINEURAL
Status: COMPLETED | OUTPATIENT
Start: 2022-04-08 | End: 2022-04-08

## 2022-04-08 RX ORDER — METHYLPREDNISOLONE ACETATE 80 MG/ML
INJECTION, SUSPENSION INTRA-ARTICULAR; INTRALESIONAL; INTRAMUSCULAR; SOFT TISSUE
Status: COMPLETED | OUTPATIENT
Start: 2022-04-08 | End: 2022-04-08

## 2022-04-08 ASSESSMENT — PAIN DESCRIPTION - LOCATION: LOCATION: BACK

## 2022-04-08 ASSESSMENT — PAIN DESCRIPTION - ORIENTATION: ORIENTATION: LOWER

## 2022-04-08 ASSESSMENT — PAIN DESCRIPTION - PAIN TYPE: TYPE: ACUTE PAIN

## 2022-04-08 ASSESSMENT — PAIN DESCRIPTION - DESCRIPTORS
DESCRIPTORS: ACHING
DESCRIPTORS: CONSTANT;ACHING

## 2022-04-08 ASSESSMENT — PAIN SCALES - GENERAL
PAINLEVEL_OUTOF10: 0
PAINLEVEL_OUTOF10: 4

## 2022-04-08 ASSESSMENT — PAIN DESCRIPTION - FREQUENCY: FREQUENCY: CONTINUOUS

## 2022-04-08 ASSESSMENT — PAIN DESCRIPTION - ONSET: ONSET: GRADUAL

## 2022-04-08 ASSESSMENT — PAIN DESCRIPTION - PROGRESSION: CLINICAL_PROGRESSION: NOT CHANGED

## 2022-04-08 NOTE — OP NOTE
Patient:  Joseline Shah  YOB: 1960  Medical Record #:  [id number]   Place: [facility]  Date:  4/8/2022   Physician:  Giorgio Novoa MD      PRE-PROCEDURE DIAGNOSIS:  Right sciatic neuropathy (G57.01)    POST-PROCEDURE DIAGNOSIS:  Right sciatic neuropathy (G57.01)    PROCEDURE: RIGHT khushbu-sciatic nerve block, with fluoroscopy. BRIEF HISTORY:  The patient presents today to [facility] for a scheduled sciatic nerve block procedure. The patient was re-evaluated today and is clinically unchanged as compared to my previous evaluation. The patient is clinically stable to proceed with the procedure. PROCEDURE NOTE:  The procedure was again explained to the patient and the previously distributed pre-procedure literature was reviewed. The options, rationale, and benefits of the procedure including pain relief, functional improvement, and increased mobility, as well as the risks of the procedure including but not limited to infection, bleeding, paresthesia, pain, failure to relieve pain, increased pain, headache, allergic reaction, neurologic impairment, local anesthetic, toxicity, and side effects and the potential side effects of corticosteroids were discussed with the patient and informed written consent was obtained from the patient. The patient was brought to the fluoroscopy suite and placed in the prone position. The RIGHT sacral and gluteal area was prepped in the usual sterile fashion with Chloraprep and then draped. Intermittent AP fluoroscopy was utilized to localize the radiographic landmarks. A standard perisciatic nerve block approach was used. The sciatic notch, at its superolateral border was localized at its junction with the ilium rostral to the acetabulum and lateral to the SI joint and lateral to the sciatic nerve.  It was localized at the radiographic marker of the overlying sciatic nerve and piriformis muscle obetween the lateral aspect of the sacrum and the greater trochanter. The superficial skin projection was anesthetized with buffered lidocaine 1% 2 cc using a 27-gauge needle. A spinal needle was then inserted slowly under direct intermittent AP fluoroscopy towards the ilium and the overlying piriformis muscle. Negative aspiration was re-demonstrated and therapeutic injectate consisting of 6 cc of lidocaine 1%, 1 cc of bupivacaine 0.5% and 1 cc of Depo-Medrol 40 mg/cc was injected without pain, paresthesia, difficulty, or complaints. The needle was removed, the area cleansed, a Band-Aid placed over the injection site. Patient tolerated the procedure well. There were no complications. The patient was transferred, by wheelchair or stretcher, with accompaniment to the recovery area where the vital signs remained stable. There was sensory or motor blockade in the lower extremity. This procedure was done using local anesthesia only. The patient was discharged in stable condition accompanied with an escort after fulfilling standard discharge criteria. FLUOROSCOPY:  A fluoroscopy unit was utilized to obtain fluoroscopic images for intra-procedural use and assistance. Fluoroscopy was utilized to identify anatomic and radiographic landmarks for the accompanying procedure guidance and not for diagnostic purposes.       Estimated Blood Loss: 0ml      Plan:  Follow up in 4-6 weeks      Office: [phone]

## 2022-04-08 NOTE — H&P
Patient:  Slade Li  YOB: 1960  Medical Record #:  [id number]   Place: [facility]  Date:  2022   Physician:  Bere Ruano MD    History Obtained From: electronic medical record    HISTORY OF PRESENT ILLNESS    Past Medical History:        Diagnosis Date    HBP (high blood pressure)     Hyperlipidemia     Obstructive sleep apnea on CPAP     Plantar fasciitis     Postoperative nausea     per patient lasted 3 weeks after knee replacement surgery    Seasonal allergies      Past Surgical History:        Procedure Laterality Date    CARPAL TUNNEL RELEASE Right    [facility] SECTION  [id number], ,      SECTION      x4    COLONOSCOPY  2018    Dr. Mallory Seek with polyp    FOOT NEUROMA SURGERY Right     HAND SURGERY Left     Thumb joint    HYSTERECTOMY      JOINT REPLACEMENT Bilateral     knees    LASIK      NERVE BLOCK Right 10/22/2021    RIGHT SCIATIC NERVE BLOCK performed by Marin Lopez MD at [address] [place] [address] Right 2021    LUMBAR EPIDURAL STEROID INJECTION - RIGHT L5-S1 performed by Marin Lopez MD at [address] [place] [address] Right 3/25/2022    LUMBAR EPIDURAL STEROID INJECTION - RIGHT L4-5 performed by Marin Lopez MD at [address] [place] [address]      left    SPINAL FUSION      L4    TOTAL KNEE ARTHROPLASTY Left 2020    ROBOTIC ASSISTED LEFT TOTAL KNEE REPLACEMENT performed by Lavonne Nowak MD at [address] [place] [address] Right 2020    RIGHT TOTAL KNEE REPLACEMENT ROBOTIC ASSISTED performed by Lavonne Nowak MD at [place] [address]     Medications Prior to Admission:   No current facility-administered medications on file prior to encounter.      Current Outpatient Medications on File Prior to Encounter   Medication Sig Dispense Refill    cyclobenzaprine (FLEXERIL) 5 MG tablet Take 1 tablet by mouth nightly as needed for Social Connections:     Frequency of Communication with Friends and Family: Not on file    Frequency of Social Gatherings with Friends and Family: Not on file    Attends [employer] Services: Not on file    Active Member of Clubs or Organizations: Not on file    Attends Club or Organization Meetings: Not on file    Marital Status: Not on file   Intimate Partner Violence:     Fear of Current or Ex-Partner: Not on file    Emotionally Abused: Not on file    Physically Abused: Not on file    Sexually Abused: Not on file   Housing Stability:     Unable to Pay for Housing in the Last Year: Not on file    Number of [place] in the Last Year: Not on file    Unstable Housing in the Last Year: Not on file     Family History   Problem Relation Age of Onset    Arthritis Other     Asthma Other     Cancer Other     Diabetes Other     High Blood Pressure Other     Breast Cancer Mother     Other Mother          PHYSICAL EXAM:      Ht 5' 2\" (1.575 m)   Wt 152 lb (68.9 kg)   LMP  (LMP Unknown)   BMI 27.80 kg/m²  I            ASSESSMENT AND PLAN:    1. Procedure. options, risks and benefits reviewed with patient and expresses understanding.

## 2022-05-05 ENCOUNTER — OFFICE VISIT (OUTPATIENT)
Dept: SLEEP MEDICINE | Age: 62
End: 2022-05-05
Payer: COMMERCIAL

## 2022-05-05 VITALS
BODY MASS INDEX: 27.9 KG/M2 | HEIGHT: 62 IN | TEMPERATURE: 95.9 F | OXYGEN SATURATION: 99 % | HEART RATE: 77 BPM | SYSTOLIC BLOOD PRESSURE: 156 MMHG | DIASTOLIC BLOOD PRESSURE: 80 MMHG | WEIGHT: 151.6 LBS | RESPIRATION RATE: 18 BRPM

## 2022-05-05 DIAGNOSIS — I10 ESSENTIAL HYPERTENSION: ICD-10-CM

## 2022-05-05 DIAGNOSIS — G47.33 OSA ON CPAP: Primary | ICD-10-CM

## 2022-05-05 DIAGNOSIS — G47.30 SLEEP APNEA WITH HYPERSOMNOLENCE: ICD-10-CM

## 2022-05-05 DIAGNOSIS — G47.10 SLEEP APNEA WITH HYPERSOMNOLENCE: ICD-10-CM

## 2022-05-05 DIAGNOSIS — Z99.89 DEPENDENCE ON OTHER ENABLING MACHINES AND DEVICES: ICD-10-CM

## 2022-05-05 DIAGNOSIS — Z99.89 OSA ON CPAP: Primary | ICD-10-CM

## 2022-05-05 PROCEDURE — 99214 OFFICE O/P EST MOD 30 MIN: CPT | Performed by: PSYCHIATRY & NEUROLOGY

## 2022-05-05 RX ORDER — ARMODAFINIL 150 MG/1
TABLET ORAL
Qty: 30 TABLET | Refills: 5 | Status: SHIPPED | OUTPATIENT
Start: 2022-05-05 | End: 2026-05-05

## 2022-05-05 ASSESSMENT — SLEEP AND FATIGUE QUESTIONNAIRES
HOW LIKELY ARE YOU TO NOD OFF OR FALL ASLEEP WHILE SITTING AND READING: 1
HOW LIKELY ARE YOU TO NOD OFF OR FALL ASLEEP WHILE SITTING INACTIVE IN A PUBLIC PLACE: 1
HOW LIKELY ARE YOU TO NOD OFF OR FALL ASLEEP WHEN YOU ARE A PASSENGER IN A CAR FOR AN HOUR WITHOUT A BREAK: 3
HOW LIKELY ARE YOU TO NOD OFF OR FALL ASLEEP IN A CAR, WHILE STOPPED FOR A FEW MINUTES IN TRAFFIC: 1
HOW LIKELY ARE YOU TO NOD OFF OR FALL ASLEEP WHILE LYING DOWN TO REST IN THE AFTERNOON WHEN CIRCUMSTANCES PERMIT: 2
HOW LIKELY ARE YOU TO NOD OFF OR FALL ASLEEP WHILE SITTING QUIETLY AFTER LUNCH WITHOUT ALCOHOL: 1
ESS TOTAL SCORE: 13
HOW LIKELY ARE YOU TO NOD OFF OR FALL ASLEEP WHILE SITTING AND TALKING TO SOMEONE: 3
HOW LIKELY ARE YOU TO NOD OFF OR FALL ASLEEP WHILE WATCHING TV: 1

## 2022-05-05 ASSESSMENT — ENCOUNTER SYMPTOMS: APNEA: 0

## 2022-05-05 NOTE — PATIENT INSTRUCTIONS
Patient Education        Learning About CPAP for Sleep Apnea  What is CPAP? CPAP is a small machine that you use at home every night while you sleep. It increases air pressure in your throat to keep your airway open. When you have sleep apnea, this can help you sleep better, feel better, and avoid futurehealth problems. CPAP stands for \"continuous positive airway pressure. \"  The CPAP machine will have one of the following:   A mask that covers your nose and mouth   A mask that covers your nose only   A nasal pillow that covers only the openings of your nose  Why is it done? CPAP is usually the best treatment for obstructive sleep apnea. It is the firsttreatment choice and the most widely used. CPAP:   Helps you have more normal sleep, so you feel less sleepy and more alert during the daytime.  May help keep heart failure or other heart problems from getting worse.  May help lower your blood pressure. If you have a bed partner, they may also sleep better when you use a CPAP. That's because you aren't snoring or restless. Your doctor may suggest CPAP if you have:   Moderate to severe sleep apnea.  Sleep apnea and coronary artery disease (CAD).  Sleep apnea and heart failure. What are the side effects? Some people who use CPAP have:   A dry or stuffy nose and a sore throat.  Irritated skin on the face.  Bloating. How can you care for yourself? If using CPAP is not comfortable, or if you have certain side effects, workwith your doctor to fix them. Here are some things you can try:   Be sure the mask, nasal mask, or nasal pillow fits well.  See if your doctor can adjust the pressure of your CPAP.  If your nose or mouth is dry, set the machine to deliver warmer or wetter air. Or try using a humidifier.  If your nose is runny or stuffy, talk to your doctor about using a decongestant medicine or steroid nasal spray. Be safe with medicines. Read and follow all instructions on the label. Do not use the medicine longer than the label says.  Your doctor may also help you with problems like swallowing air, bloating, or claustrophobia. Talk to your doctor if you're still having problems. If these things don'thelp, you might try a different type of machine. Where can you learn more? Go to [email or web address] org and sign in to your minicabit account. Enter S017 in the [employer] box to learn more about \"Learning About CPAP for Sleep Apnea. \"     If you do not have an account, please click on the \"Sign Up Now\" link. Current as of: July 6, 2021               Content Version: 13.2  © 2006-2022 Healthwise, Incorporated. Care instructions adapted under license by [facility] ([facility]). If you have questions about a medical condition or this instruction, always ask your healthcare professional. [address] any warranty or liability for your use of this information.

## 2022-05-05 NOTE — PROGRESS NOTES
Coby Dance, MD ABIM Board Certified in Sleep Medicine  Certified in [address] [place] [address] Certified in Neurology [address] [place], [place] [address]KIMBERLY [address]  [phone]   [address] [place] [address]                      [facility]  [address] [address] [phone]    Subjective:     Patient ID: Vlad Green is a 57 y.o. female. Chief Complaint   Patient presents with    Follow-up    Sleep Apnea       HPI:        Vlad Green is a 57 y.o. female was seen today as a follow for severe obstructive sleep apnea and EDS with apnea with apnea hypopnea index of 33/h with lowest O2 saturation of 79%  Had new APAP between 10 and 15 cm. Quit the Doxepin 10 mg , did not help much  Patient is using the PAP machine about 100% of the time, more than 4 hours a nightabout  100 %, in total average of 8:37 hours a night in last 90 days. Currently on PAP at 12.3 cm (10-15), the AHI is only 1.6 events per hour at this pressure. Patient improved regarding daytime sleepiness and fatigue, wakes up refreshed in the morning. The Patient scored Total score: 13 on [place] Sleepiness Scale ( more than 10 is indicative of daytime sleepiness)   Patient has no problem with PAP pressure or mask. Uses nasal mask. BP is up.    DOT/CDL - N/A        Previous Report(s)Reviewed: historical medical records         Social History     Socioeconomic History    Marital status:      Spouse name: Not on file    Number of children: Not on file    Years of education: Not on file    Highest education level: Not on file   Occupational History    Not on file   Tobacco Use    Smoking status: Never Smoker    Smokeless tobacco: Never Used   Vaping Use    Vaping Use: Never used   Substance and Sexual Activity    Alcohol use: Yes     Comment: monthly, occasional drink    Drug use: No    Sexual activity: Yes     Partners: Male     Comment: hysterectomy   Other Topics Concern    Not on file   Social History Narrative    Not on file     Social Determinants of Health     Financial Resource Strain: Low Risk     Difficulty of Paying Living Expenses: Not hard at all   Food Insecurity: No Food Insecurity    Worried About [address] in the Last Year: Never true    [address] in the Last Year: Never true   Transportation Needs:     Lack of Transportation (Medical): Not on file    Lack of Transportation (Non-Medical): Not on file   Physical Activity:     Days of Exercise per Week: Not on file    Minutes of Exercise per Session: Not on file   Stress:     Feeling of Stress : Not on file   Social Connections:     Frequency of Communication with Friends and Family: Not on file    Frequency of Social Gatherings with Friends and Family: Not on file    Attends Zoroastrian Services: Not on file    Active Member of [address] [place] [address] or Organizations: Not on file    Attends Club or Organization Meetings: Not on file    Marital Status: Not on file   Intimate Partner Violence:     Fear of Current or Ex-Partner: Not on file    Emotionally Abused: Not on file    Physically Abused: Not on file    Sexually Abused: Not on file   Housing Stability:     Unable to Pay for Housing in the Last Year: Not on file    Number of [place] in the Last Year: Not on file    Unstable Housing in the Last Year: Not on file       Prior to Admission medications    Medication Sig Start Date End Date Taking?  Authorizing Provider   Armodafinil (NUVIGIL) 150 MG TABS tablet One tab QAM 5/5/22 5/5/26 Yes Eileen You MD   cyclobenzaprine (FLEXERIL) 5 MG tablet Take 1 tablet by mouth nightly as needed for Muscle spasms 12/23/21  Yes MINERVA Self - MOHAN   losartan-hydroCHLOROthiazide (HYZAAR) 100-12.5 MG per tablet TAKE 1 TABLET BY MOUTH ONE TIME A DAY 21  Yes MINERVA Rodgers CNP   amitriptyline (ELAVIL) 25 MG tablet Take 1 tablet by mouth nightly 21  Yes MINERVA Rodgers CNP   buPROPion (WELLBUTRIN XL) 150 MG extended release tablet TAKE ONE TABLET BY MOUTH EVERY MORNING 21  Yes Desean Yi MD   metoprolol tartrate (LOPRESSOR) 25 MG tablet Take 1 tablet by mouth 2 times daily 20 Yes Desean Yi MD   atorvastatin (LIPITOR) 80 MG tablet TAKE 1 TABLET BY MOUTH ONCE DAILY 20  Yes Desean Yi MD   fluticasone [facility]) 50 MCG/ACT nasal spray 1 spray by Nasal route daily as needed  3/23/16  Yes Historical Provider, MD       Allergies as of 2022 - Fully Reviewed 2022   Allergen Reaction Noted    Vicodin [hydrocodone-acetaminophen] Shortness Of Breath 2013    Lisinopril  2020    Sansert [methysergide]  2018    Toradol [ketorolac tromethamine] Nausea Only 2020       Patient Active Problem List   Diagnosis    [facility] DJD(carpometacarpal degenerative joint disease), localized primary    Fibromyalgia    Essential hypertension    Mixed hyperlipidemia    Dysthymia    Metatarsalgia of right foot    Status post right knee replacement    Left shoulder pain    Rotator cuff tendonitis, left    DAVID (obstructive sleep apnea)    Arthritis of right knee    DDD (degenerative disc disease), lumbar    Lumbar radicular pain    Arthritis of right hip    S/P lumbar fusion    Foraminal stenosis of lumbar region       Past Medical History:   Diagnosis Date    HBP (high blood pressure)     Hyperlipidemia     Obstructive sleep apnea on CPAP     Plantar fasciitis     Postoperative nausea     per patient lasted 3 weeks after knee replacement surgery    Seasonal allergies        Past Surgical History:   Procedure Laterality Date    CARPAL TUNNEL RELEASE Right      SECTION  [id number], ,      SECTION      x4    COLONOSCOPY 03/27/2018    Dr. Rashaad Williamson with polyp    FOOT NEUROMA SURGERY Right     HAND SURGERY Left     Thumb joint    HYSTERECTOMY      JOINT REPLACEMENT Bilateral 2021    knees    LASIK      NERVE BLOCK Right 10/22/2021    RIGHT SCIATIC NERVE BLOCK performed by Janessa Wilson MD at [facility] Right 4/8/2022    RIGHT SCIATIC NERVE BLOCK performed by Janessa Wilson MD at [facility] Right 9/24/2021    LUMBAR EPIDURAL STEROID INJECTION - RIGHT L5-S1 performed by Janessa Wilson MD at [facility] Right 3/25/2022    LUMBAR EPIDURAL STEROID INJECTION - RIGHT L4-5 performed by Janessa Wilson MD at [facility]      left    SPINAL FUSION      L4    TOTAL KNEE ARTHROPLASTY Left 5/26/2020    ROBOTIC ASSISTED LEFT TOTAL KNEE REPLACEMENT performed by Grazyna Cifuentes MD at [address] [facility]"" Right 12/2/2020    RIGHT TOTAL KNEE REPLACEMENT ROBOTIC ASSISTED performed by Grazyna Cifuentes MD at [facility] History   Problem Relation Age of Onset    Arthritis Other     Asthma Other     Cancer Other     Diabetes Other     High Blood Pressure Other     Breast Cancer Mother     Other Mother        Review of Systems   Constitutional: Negative for fatigue. Respiratory: Negative for apnea. Genitourinary: Negative for frequency. Neurological: Negative for headaches. Objective:     Vitals:  Weight BMI Neck circumference    Wt Readings from Last 3 Encounters:   05/05/22 151 lb 9.6 oz (68.8 kg)   04/08/22 148 lb 12.8 oz (67.5 kg)   03/25/22 152 lb (68.9 kg)    Body mass index is 27.73 kg/m².        BP HR SaO2   BP Readings from Last 3 Encounters:   05/05/22 (!) 160/80   04/08/22 (!) 142/74   03/25/22 (!) 163/83    Pulse Readings from Last 3 Encounters:   05/05/22 77   04/08/22 90   03/25/22 73    SpO2 Readings from Last 3 Encounters:   05/05/22 99% 04/08/22 97%   03/25/22 98%        Themandibular molar Class :   [x]1 []2 []3      Mallampati I Airway Classification:   []1 []2 []3 [x]4      Physical Exam  Vitals and nursing note reviewed. Constitutional:       Appearance: She is obese. Cardiovascular:      Rate and Rhythm: Normal rate and regular rhythm. Pulmonary:      Effort: Pulmonary effort is normal.      Breath sounds: Normal breath sounds. Musculoskeletal:      Right lower leg: No edema. Left lower leg: No edema.         :   Severe Obstructive Sleep Apnea/Hypopnea Syndrome under good control on PAP at 12.3 cmwp. Diagnosis Orders   1. DAVID on CPAP     2. Dependence on other enabling machines and devices     3. Essential hypertension     4. Sleep apnea with hypersomnolence  Armodafinil (NUVIGIL) 150 MG TABS tablet     Plan:   Sleep Compression to 7.5 hours a night  Will continue the PAP at 10-15 cmwp. I will order PAP supplies, mask, filters. ... Most likely treating the DAVID will have positive impact on HTN control. No orders of the defined types were placed in this encounter. Return in about 6 months (around 11/5/2022) for Reveiwing CPAP usage and compliance report and tro and EDS.     Ute Swartz MD  Medical Director - [facility]

## 2022-06-10 ENCOUNTER — OFFICE VISIT (OUTPATIENT)
Dept: ORTHOPEDIC SURGERY | Age: 62
End: 2022-06-10

## 2022-06-10 DIAGNOSIS — S82.831A CLOSED FRACTURE OF DISTAL END OF RIGHT FIBULA, UNSPECIFIED FRACTURE MORPHOLOGY, INITIAL ENCOUNTER: Primary | ICD-10-CM

## 2022-06-10 PROCEDURE — L1902 AFO ANKLE GAUNTLET PRE OTS: HCPCS | Performed by: ORTHOPAEDIC SURGERY

## 2022-06-10 PROCEDURE — 99213 OFFICE O/P EST LOW 20 MIN: CPT | Performed by: ORTHOPAEDIC SURGERY

## 2022-06-10 PROCEDURE — 27786 TREATMENT OF ANKLE FRACTURE: CPT | Performed by: ORTHOPAEDIC SURGERY

## 2022-06-10 NOTE — PROGRESS NOTES
[place] [address] and Spine  Office Visit    Chief Complaint: Right ankle injury    HPI:  Gia Garcia is a 60 y.o. who is here for initial evaluation of right ankle injury. She stumbled and rolled her right ankle earlier this morning. She has noted increased anterolateral pain and swelling since that time. She continues to be able to walk in regular footwear although with pain. She denies a history of injury or surgery to her right ankle. Her history is significant for bilateral total knee arthroplasty with Dr. Shadi Reddy. She rates this ankle pain is 6/10 today. Patient Active Problem List   Diagnosis    [facility] DJD(carpometacarpal degenerative joint disease), localized primary    Fibromyalgia    Essential hypertension    Mixed hyperlipidemia    Dysthymia    Metatarsalgia of right foot    Status post right knee replacement    Left shoulder pain    Rotator cuff tendonitis, left    DAVID (obstructive sleep apnea)    Arthritis of right knee    DDD (degenerative disc disease), lumbar    Lumbar radicular pain    Arthritis of right hip    S/P lumbar fusion    Foraminal stenosis of lumbar region       ROS:  Constitutional: denies fever, chills, weight loss  MSK: denies pain in other joints, muscle aches  Neurological: denies numbness, tingling, weakness    Exam:  Appearance: sitting in exam room chair, appears to be in no acute distress, awake and alert  Resp: unlabored breathing on room air  Skin: warm, dry and intact with out erythema or significant increased temperature  Neuro: grossly intact both lower extremities. Intact sensation to light touch. Motor exam 4+ to 5/5 in all major motor groups. RLE: Tender with swelling of her anterolateral ankle and distal fibula. Mild tenderness over medial malleolus. Nontender over the calcaneus, midfoot, forefoot, proximal tibia. Motor function and sensation intact distally.     Imaging:  3 views of the right ankle were performed and interpreted today.  She has a transverse fracture of the distal fibula tip with minimal displacement. There are no other fractures or dislocations. Assessment:  Right anterolateral ankle sprain with distal fibula fracture    Plan:  We discussed the diagnosis and treatment options. This is a stable ankle fracture. I recommended continued weightbearing as tolerated and provided her with a lace up ankle brace today. She may come out of this for range of motion exercises. She will control pain with NSAIDs, Tylenol, ice, elevation. She will return here for repeat evaluation in 3 weeks. Procedures    [employer] Wraptor Ankle Brace     Patient was prescribed a Swedo Ankle Brace. The right ankle will require stabilization / immobilization from this semi-rigid / rigid orthosis to improve their function. The orthosis will assist in protecting the affected area, provide functional support and facilitate healing. Patient was instructed to progress ambulation weight bearing as tolerated in the device. The patient was educated and fit by a healthcare professional with expert knowledge and specialization in brace application while under the direct supervision of the treating physician. Verbal and written instructions for the use of and application of this item were provided. They were instructed to contact the office immediately should the brace result in increased pain, decreased sensation, increased swelling or worsening of the condition. Total time spent on today's encounter was at least 22 minutes. This time included reviewing prior notes, radiographs, and lab results when available, reviewing history obtained by medical assistant, performing history and physical exam, reviewing tests/radiographs with the patient, counseling the patient, ordering medications or tests, documentation in the electronic health record, and coordination of care.     This dictation was done with Dragon dictation and may contain mechanical errors related to translation.     Fx charge

## 2022-06-22 RX ORDER — ATORVASTATIN CALCIUM 80 MG/1
TABLET, FILM COATED ORAL
Qty: 90 TABLET | Refills: 3 | Status: SHIPPED | OUTPATIENT
Start: 2022-06-22

## 2022-07-01 ENCOUNTER — HOSPITAL ENCOUNTER (OUTPATIENT)
Dept: WOMENS IMAGING | Age: 62
Discharge: HOME | End: 2022-07-01
Payer: COMMERCIAL

## 2022-07-01 DIAGNOSIS — Z12.31 BREAST CANCER SCREENING BY MAMMOGRAM: ICD-10-CM

## 2022-07-01 PROCEDURE — 77067 SCR MAMMO BI INCL CAD: CPT

## 2022-07-01 PROCEDURE — 77063 BREAST TOMOSYNTHESIS BI: CPT

## 2022-07-05 ENCOUNTER — TELEPHONE (OUTPATIENT)
Dept: ORTHOPEDIC SURGERY | Age: 62
End: 2022-07-05

## 2022-07-22 ENCOUNTER — HOSPITAL ENCOUNTER (OUTPATIENT)
Dept: WOMENS IMAGING | Age: 62
Discharge: HOME | End: 2022-07-22
Payer: COMMERCIAL

## 2022-07-22 ENCOUNTER — HOSPITAL ENCOUNTER (OUTPATIENT)
Dept: ULTRASOUND IMAGING | Age: 62
Discharge: HOME | End: 2022-07-22
Payer: COMMERCIAL

## 2022-07-22 DIAGNOSIS — R92.8 ABNORMAL MAMMOGRAM: ICD-10-CM

## 2022-07-22 PROCEDURE — G0279 TOMOSYNTHESIS, MAMMO: HCPCS

## 2022-07-22 PROCEDURE — 76642 ULTRASOUND BREAST LIMITED: CPT

## 2022-07-25 ENCOUNTER — OFFICE VISIT (OUTPATIENT)
Dept: ORTHOPEDIC SURGERY | Age: 62
End: 2022-07-25
Payer: COMMERCIAL

## 2022-07-25 VITALS — BODY MASS INDEX: 27.79 KG/M2 | WEIGHT: 151 LBS | HEIGHT: 62 IN

## 2022-07-25 DIAGNOSIS — Z98.1 S/P LUMBAR FUSION: Primary | ICD-10-CM

## 2022-07-25 DIAGNOSIS — M47.26 OTHER SPONDYLOSIS WITH RADICULOPATHY, LUMBAR REGION: ICD-10-CM

## 2022-07-25 PROCEDURE — 99213 OFFICE O/P EST LOW 20 MIN: CPT | Performed by: PHYSICIAN ASSISTANT

## 2022-07-25 NOTE — PROGRESS NOTES
Thumb joint    HYSTERECTOMY (CERVIX STATUS UNKNOWN)      JOINT REPLACEMENT Bilateral 2021    knees    LASIK      NERVE BLOCK Right 10/22/2021    RIGHT SCIATIC NERVE BLOCK performed by Joel Dhillon MD at [address] Right 4/8/2022    RIGHT SCIATIC NERVE BLOCK performed by Joel Dhillon MD at [address] Right 9/24/2021    LUMBAR EPIDURAL STEROID INJECTION - RIGHT L5-S1 performed by Joel Dhillon MD at [address] Right 3/25/2022    LUMBAR EPIDURAL STEROID INJECTION - RIGHT L4-5 performed by Joel Dhillon MD at [address]      left    SPINAL FUSION      L4    TOTAL KNEE ARTHROPLASTY Left 5/26/2020    ROBOTIC ASSISTED LEFT TOTAL KNEE REPLACEMENT performed by Jordan Villa MD at [place] [address] Right 12/2/2020    RIGHT TOTAL KNEE REPLACEMENT ROBOTIC ASSISTED performed by Jordan Villa MD at [place] [address]       Current Medications:     Current Outpatient Medications:     atorvastatin (LIPITOR) 80 MG tablet, TAKE 1 TABLET BY MOUTH ONE TIME A DAY, Disp: 90 tablet, Rfl: 3    metoprolol tartrate (LOPRESSOR) 25 MG tablet, TAKE 1 TABLET BY MOUTH 2 TIMES A DAY, Disp: 180 tablet, Rfl: 3    Armodafinil (NUVIGIL) 150 MG TABS tablet, One tab QAM, Disp: 30 tablet, Rfl: 5    cyclobenzaprine (FLEXERIL) 5 MG tablet, Take 1 tablet by mouth nightly as needed for Muscle spasms, Disp: 90 tablet, Rfl: 3    losartan-hydroCHLOROthiazide (HYZAAR) 100-12.5 MG per tablet, TAKE 1 TABLET BY MOUTH ONE TIME A DAY, Disp: 90 tablet, Rfl: 3    amitriptyline (ELAVIL) 25 MG tablet, Take 1 tablet by mouth nightly, Disp: 90 tablet, Rfl: 3    buPROPion (WELLBUTRIN XL) 150 MG extended release tablet, TAKE ONE TABLET BY MOUTH EVERY MORNING, Disp: 90 tablet, Rfl: 3    fluticasone (FLONASE) 50 MCG/ACT nasal spray, 1 spray by Nasal route daily as needed , Disp: , Rfl:     Allergies: Vicodin [hydrocodone-acetaminophen], Lisinopril, Sansert [methysergide], and Toradol [ketorolac tromethamine]    Social History:    reports that she has never smoked. She has never used smokeless tobacco. She reports current alcohol use. She reports that she does not use drugs. Family History:   Family History   Problem Relation Age of Onset    Arthritis Other     Asthma Other     Cancer Other     Diabetes Other     High Blood Pressure Other     Breast Cancer Mother     Other Mother        REVIEW OF SYSTEMS: Full ROS noted & scanned   CONSTITUTIONAL: Denies unexplained weight loss, fevers, chills or fatigue  NEUROLOGICAL: Denies unsteady gait or progressive weakness  MUSCULOSKELETAL: Denies joint swelling or redness  PSYCHOLOGICAL: Denies anxiety, depression   SKIN: Denies skin changes, delayed healing, rash, itching   HEMATOLOGIC: Denies easy bleeding or bruising  ENDOCRINE: Denies excessive thirst, urination, heat/cold  RESPIRATORY: Denies current dyspnea, cough  GI: Denies nausea, vomiting, diarrhea   : Denies bowel or bladder issues      PHYSICAL EXAM:    Vitals: Height 5' 2\" (1.575 m), weight 151 lb (68.5 kg), not currently breastfeeding. GENERAL EXAM:  General Apparence: Patient is adequately groomed with no evidence of malnutrition. Orientation: The patient is oriented to time, place and person. Mood & Affect:The patient's mood and affect are appropriate. Vascular: Examination reveals no swelling tenderness in upper or lower extremities. Good capillary refill. Lymphatic: The lymphatic examination bilaterally reveals all areas to be without enlargement or induration  Sensation: Sensation is intact without deficit  Coordination/Balance: Good coordination. Tandem walking normal.     LUMBAR/SACRAL EXAMINATION:  Inspection: Local inspection shows no step-off or bruising. Lumbar alignment is normal.  Sagittal and Coronal balance is neutral.      Palpation:   No evidence of tenderness at the midline. No tenderness bilaterally at the paraspinal or trochanters. There is no step-off or paraspinal spasm. Range of Motion: Lumbar flexion, extension and rotation are mildly limited due to pain. Strength:   Strength testing is 5/5 in all muscle groups tested. Special Tests:   Straight leg raise and crossed SLR negative. Leg length and pelvis level. Skin: There are no rashes, ulcerations or lesions. Reflexes: Reflexes are symmetrically 2+ at the patellar and ankle tendons. Clonus absent bilaterally at the feet. Gait & station: normal, patient ambulates without assistance    Additional Examinations:   RIGHT LOWER EXTREMITY: Inspection/examination of the right lower extremity does not show any tenderness, deformity or injury. Range of motion is unremarkable. There is no gross instability. There are no rashes, ulcerations or lesions. Strength and tone are normal.  LEFT LOWER EXTREMITY:  Inspection/examination of the left lower extremity does not show any tenderness, deformity or injury. Range of motion is unremarkable. There is no gross instability. There are no rashes, ulcerations or lesions. Strength and tone are normal.    Diagnostic Testing:    I reviewed MRI images of her lumbar spine from 5/4/21. They show posterior fusion L4-5. It is possible her right L5 screw may be impinging her right L5 nerve. Mild spondylosis noted adjacent to her fusion    Impression:   S/P L4-5 fusion  Lumbar spondylosis with radiculopathy    Plan:    We discussed treatment options including observation, additional oral steroids, physical therapy, epidural injections and additional imaging. She wishes to proceed with chiropractic care, home exercise program, and return to see Dr. Viral Apodaca to discuss additional injections. She will return or call to schedule a lumbar CT and right lower extremity EMG if her symptoms persist or worsen. Patient examined and note dictated by Jnan Owusu PA-C.  Patient also seen and examined by  Tabitha

## 2022-07-28 ENCOUNTER — TELEPHONE (OUTPATIENT)
Dept: FAMILY MEDICINE CLINIC | Age: 62
End: 2022-07-28

## 2022-07-28 NOTE — TELEPHONE ENCOUNTER
Called [facility] and left , received disability forms. Dr Emerald Kidd does not do disability forms. Viona Hodgkin would need to reach out to an  then see a specialist that would be able to fill this out.

## 2022-07-29 ENCOUNTER — TELEPHONE (OUTPATIENT)
Dept: FAMILY MEDICINE CLINIC | Age: 62
End: 2022-07-29

## 2022-08-02 NOTE — PROGRESS NOTES
[facility] ENDOSCOPY [facility] OUTPAT[facility]  PRE-PROCEDURE INSTRUCTIONS    Procedure date___8/8/22______Arrival time_____745_______        Procedure time_______845_____       Screening questions for Pain procedures:  Do you have a current infection? ____n____  Are you currently taking an antibiotic?__n____  Are you taking a blood thinner? _n_______    It is not necessary to stop eating or drinking prior to this procedure. We would like you to take your medications for blood pressure as usual.  You may be asked to stop blood thinners such as Coumadin, Plavix, Fragmin, Lovenox, etc., or any anti-inflammatories such as:  Aspirin, Ibuprofen, Advil, Naproxen prior to your procedure. We also ask that you stop any OTC medications that cause additional bleeding    You must make arrangements for a responsible adult to arrive with you and stay in our waiting area during your procedure. They will also need to take you home after your procedure. For your safety you will not be allowed to leave alone or drive yourself home. Also for your safety, it is strongly suggested that someone stay with you the first 24 hours after your procedure. For your comfort, please wear simple loose fitting clothing to the center. Please do not bring valuables. If you have a living will and a durable power of  for healthcare, please bring in a copy.     You will need to bring a photo ID and insurance card    Our goal is to provide you with excellent care so if you have any questions, please contact us at the [facility] at [phone]

## 2022-08-05 ENCOUNTER — APPOINTMENT (OUTPATIENT)
Dept: INTERVENTIONAL RADIOLOGY/VASCULAR | Age: 62
End: 2022-08-05
Attending: ANESTHESIOLOGY
Payer: COMMERCIAL

## 2022-08-05 ENCOUNTER — HOSPITAL ENCOUNTER (OUTPATIENT)
Age: 62
Setting detail: OUTPATIENT SURGERY
Discharge: HOME | End: 2022-08-05
Attending: ANESTHESIOLOGY | Admitting: ANESTHESIOLOGY
Payer: COMMERCIAL

## 2022-08-05 VITALS
TEMPERATURE: 98.5 F | HEART RATE: 67 BPM | OXYGEN SATURATION: 100 % | SYSTOLIC BLOOD PRESSURE: 148 MMHG | RESPIRATION RATE: 14 BRPM | HEIGHT: 62 IN | DIASTOLIC BLOOD PRESSURE: 68 MMHG | BODY MASS INDEX: 27.79 KG/M2 | WEIGHT: 151 LBS

## 2022-08-05 PROCEDURE — 6360000002 HC RX W HCPCS: Performed by: ANESTHESIOLOGY

## 2022-08-05 PROCEDURE — 6360000004 HC RX CONTRAST MEDICATION: Performed by: ANESTHESIOLOGY

## 2022-08-05 PROCEDURE — 2709999900 HC NON-CHARGEABLE SUPPLY: Performed by: ANESTHESIOLOGY

## 2022-08-05 PROCEDURE — 3610000054 HC PAIN LEVEL 3 BASE (NON-OR): Performed by: ANESTHESIOLOGY

## 2022-08-05 RX ORDER — METHYLPREDNISOLONE ACETATE 80 MG/ML
INJECTION, SUSPENSION INTRA-ARTICULAR; INTRALESIONAL; INTRAMUSCULAR; SOFT TISSUE
Status: COMPLETED | OUTPATIENT
Start: 2022-08-05 | End: 2022-08-05

## 2022-08-05 ASSESSMENT — PAIN DESCRIPTION - DESCRIPTORS
DESCRIPTORS: DULL

## 2022-08-05 ASSESSMENT — PAIN - FUNCTIONAL ASSESSMENT
PAIN_FUNCTIONAL_ASSESSMENT: 0-10
PAIN_FUNCTIONAL_ASSESSMENT: ACTIVITIES ARE NOT PREVENTED

## 2022-08-05 ASSESSMENT — PAIN DESCRIPTION - ORIENTATION
ORIENTATION: LOWER
ORIENTATION: LOWER

## 2022-08-05 ASSESSMENT — PAIN DESCRIPTION - LOCATION
LOCATION: BACK
LOCATION: BACK

## 2022-08-05 ASSESSMENT — PAIN SCALES - GENERAL
PAINLEVEL_OUTOF10: 2
PAINLEVEL_OUTOF10: 2

## 2022-08-05 NOTE — DISCHARGE INSTRUCTIONS
[facility]   [address], [address] [place] [address]     Patient:  Petra Ragland  YOB: 1960  Medical Record #:  [id number]   Place: [address] [place] [address]  Date:  8/5/2022   Physician:  Ramona Price MD    Procedure Performed: [unfilled]     Discharge Instructions    Notify Pain Management Services if any of the following occur:    Redness/Swelling at the injection site lasting longer than 2 days  Fever (with redness, swelling, or drainage at the injection site)  Drainage at the injection site  New weakness/ numbness  Severe headache   Loss of bowel/ bladder function    General Instructions: You may experience numbness for several hours following your treatment. You should be cautious using those areas which are numb. Once the numbness wears off, you may apply ice or heat to injection site, if needed. Do not return to work or drive today    Rest today and return to normal activities tomorrow. On average, the steroid takes about 1 week to work and can be up to 2 weeks    You should continue to depend on your primary physician for your medical management of conditions not related to your pain management treatment. Continue to take all your other medications, including blood thinners, as directed by your primary physician unless otherwise instructed. NO changes have been made to your medications. Any changes listed on the discharge are based on patient self reporting. Any EMR warnings regarding drug/drug interactions were dismissed based on current use by the patient, management by prescribing physician and pharmacist and not managed by this physician. Any problem which relates specifically to a treatment or procedure performed today should be directed to the [facility].        [facility]  Division of Interventional Pain Management  [address] [place] [address] [id number] [phone]

## 2022-08-05 NOTE — H&P
Patient:  Vinny Parrish  YOB: 1960  Medical Record #:  [id number]   Place: [address]  Date:  2022   Physician:  Alycia Almaraz MD    History Obtained From: electronic medical record    HISTORY OF PRESENT ILLNESS    Past Medical History:        Diagnosis Date    HBP (high blood pressure)     Hyperlipidemia     Obstructive sleep apnea on CPAP     Plantar fasciitis     Postoperative nausea     per patient lasted 3 weeks after knee replacement surgery    Seasonal allergies      Past Surgical History:        Procedure Laterality Date    CARPAL TUNNEL RELEASE Right      SECTION  0923,3104, ,      SECTION      x4    COLONOSCOPY  2018    Dr. Darlene Patino with polyp    FOOT NEUROMA SURGERY Right     HAND SURGERY Left     Thumb joint    HYSTERECTOMY (CERVIX STATUS UNKNOWN)      JOINT REPLACEMENT Bilateral     knees    LASIK      NERVE BLOCK Right 10/22/2021    RIGHT SCIATIC NERVE BLOCK performed by Rebekah Rosario MD at [address] Right 2022    RIGHT SCIATIC NERVE BLOCK performed by Rebekah Rosario MD at [address] Right 2021    LUMBAR EPIDURAL STEROID INJECTION - RIGHT L5-S1 performed by Rebekah Rosario MD at [address] Right 3/25/2022    LUMBAR EPIDURAL STEROID INJECTION - RIGHT L4-5 performed by Rebekah Rosario MD at [address]      left    SPINAL FUSION      L4    TOTAL KNEE ARTHROPLASTY Left 2020    ROBOTIC ASSISTED LEFT TOTAL KNEE REPLACEMENT performed by Noble Romberg, MD at [address] [facility] Right 2020    RIGHT TOTAL KNEE REPLACEMENT ROBOTIC ASSISTED performed by Noble Romberg, MD at [place] [address]     Medications Prior to Admission:   No current facility-administered medications on file prior to encounter.      Current Outpatient Medications on File Prior to Encounter Medication Sig Dispense Refill    atorvastatin (LIPITOR) 80 MG tablet TAKE 1 TABLET BY MOUTH ONE TIME A DAY 90 tablet 3    metoprolol tartrate (LOPRESSOR) 25 MG tablet TAKE 1 TABLET BY MOUTH 2 TIMES A  tablet 3    Armodafinil (NUVIGIL) 150 MG TABS tablet One tab QAM 30 tablet 5    cyclobenzaprine (FLEXERIL) 5 MG tablet Take 1 tablet by mouth nightly as needed for Muscle spasms 90 tablet 3    losartan-hydroCHLOROthiazide (HYZAAR) 100-12.5 MG per tablet TAKE 1 TABLET BY MOUTH ONE TIME A DAY 90 tablet 3    amitriptyline (ELAVIL) 25 MG tablet Take 1 tablet by mouth nightly 90 tablet 3    buPROPion (WELLBUTRIN XL) 150 MG extended release tablet TAKE ONE TABLET BY MOUTH EVERY MORNING 90 tablet 3    fluticasone (FLONASE) 50 MCG/ACT nasal spray 1 spray by Nasal route daily as needed        Allergies:  Vicodin [hydrocodone-acetaminophen], Lisinopril, Sansert [methysergide], and Toradol [ketorolac tromethamine]  Social History     Socioeconomic History    Marital status:      Spouse name: Not on file    Number of children: Not on file    Years of education: Not on file    Highest education level: Not on file   Occupational History    Not on file   Tobacco Use    Smoking status: Never    Smokeless tobacco: Never   Vaping Use    Vaping Use: Never used   Substance and Sexual Activity    Alcohol use: Yes     Comment: monthly, occasional drink    Drug use: No    Sexual activity: Yes     Partners: Male     Comment: hysterectomy   Other Topics Concern    Not on file   Social History Narrative    Not on file     Social Determinants of Health     Financial Resource Strain: Low Risk     Difficulty of Paying Living Expenses: Not hard at all   Food Insecurity: No Food Insecurity    Worried About Running Out of Food in the Last Year: Never true    Ran Out of Food in the Last Year: Never true   Transportation Needs: Not on file   Physical Activity: Not on file   Stress: Not on file   Social Connections: Not on file Intimate Partner Violence: Not on file   Housing Stability: Not on file     Family History   Problem Relation Age of Onset    Arthritis Other     Asthma Other     Cancer Other     Diabetes Other     High Blood Pressure Other     Breast Cancer Mother     Other Mother          PHYSICAL EXAM:      BP (!) 130/56   Pulse 65   Temp 98.7 °F (37.1 °C) (Temporal)   Resp 14   Ht 5' 2\" (1.575 m)   Wt 151 lb (68.5 kg)   LMP  (LMP Unknown)   SpO2 100%   BMI 27.62 kg/m²  I            ASSESSMENT AND PLAN:    1. Procedure. options, risks and benefits reviewed with patient and expresses understanding.

## 2022-08-05 NOTE — OP NOTE
Patient:  Gavi Lopez  YOB: 1960  Medical Record #:  [id number]   Place: [address] [facility]  Date:  8/5/2022   Physician:  Nohemy Kohli MD    PRE-PROCEDURE DIAGNOSIS: M54.17    POST-PROCEDURE DIAGNOSIS: M54.17    PROCEDURE:  Midline interlaminar right L5-S1 epidural steroid injection with fluoroscopy and epidurography. BRIEF HISTORY:  The patient presents today to [facility] for a scheduled lumbar epidural steroid injection procedure. The patient was re-evaluated today and is clinically unchanged as compared to my previous evaluation. The patient is clinically stable to proceed with the procedure. PROCEDURE NOTE:  The procedure was again explained to the patient and the previously distributed pre-procedure literature was reviewed. The options, rationale, and benefits of the procedure including pain relief, functional improvement, and increased mobility, as well as the risks of the procedure including but not limited to infection, bleeding, paresthesia, pain, failure to relieve pain, increased pain, headache, allergic reaction, neurologic impairment, local anesthetic, toxicity, and side effects and the potential side effects of corticosteroids were discussed with the patient and informed written consent was obtained from the patient. The patient was positioned in the prone position on the fluoroscopy table. The skin overlying the lumbosacral vertebrae was prepped using Chloraprep and draped in the usual sterile fashion. The L5-S1 lumbar intervertebral level was identified using intermittent AP fluoroscopy. The previously identified projection of overlying skin was anesthetized using 2 cc of buffered 1% lidocaine with a 27 gauge needle. A 3.5\" 22g Touhy needle was advanced through a small skin nick in the AP view towards the interlaminar and epidural space. The epidural space was easily identified using loss of resistance to saline.   No difficulty, paresthesia or occurrence of pain was encountered. Careful aspiration was negative for CSF and blood. A total of 1 cc Isovue 300 was injected yielding an epidurogram.    FLUOROSCOPY:  A fluoroscopy unit was utilized to obtain fluoroscopic images for intra-procedural use and assistance. Fluoroscopy was utilized to identify anatomic and radiographic landmarks for the accompanying procedure guidance and not for diagnostic purposes. After negative aspiration 4 cc of therapeutic injectate containing 1 cc of Depo-Medrol 80 mg/cc and 3 ml of lidocaine 1% was injected slowly in aliquots while clinically observing and monitoring the patient with negative aspiration demonstrated between aliquots of injections. At this time no paresthesia or occurrence of pain was present. The needle was then removed, the area was cleansed and a Band-Aid was placed over the injection site. There were no complications. The patient tolerated the procedure well. The procedure was performed using local anesthesia. The patient was transferred by wheelchair with accompaniment to the  Recovery Area and was monitored per protocol. The vital signs remained stable. The patient was discharged in stable condition accompanied by an escort with written instructions after fulfilling the standard discharge criteria. Written follow up instructions were given to the patient. Estimated Blood Loss: 0ml    An initial attempt was made at the planned level. However, due to anatomy the level was changed and the patient was informed of this change.      Plan:  Follow up in 6-8 weeks    Office: [phone]

## 2022-08-17 NOTE — PROGRESS NOTES
[facility] ENDOSCOPY AND OUTPATIENT  PRE-PROCEDURE INSTRUCTIONS    Procedure date___08/19/2022______Arrival time______0815______        Procedure time_______0915_____       Screening questions for Pain procedures:  Do you have a current infection? ____no_____  Are you currently taking an antibiotic?__no____  Are you taking a blood thinner?___no_____    It is not necessary to stop eating or drinking prior to this procedure. We would like you to take your medications for blood pressure as usual.  You may be asked to stop blood thinners such as Coumadin, Plavix, Fragmin, Lovenox, etc., or any anti-inflammatories such as:  Aspirin, Ibuprofen, Advil, Naproxen prior to your procedure. We also ask that you stop any OTC medications that cause additional bleeding    You must make arrangements for a responsible adult to arrive with you and stay in our waiting area during your procedure. They will also need to take you home after your procedure. For your safety you will not be allowed to leave alone or drive yourself home. Also for your safety, it is strongly suggested that someone stay with you the first 24 hours after your procedure. For your comfort, please wear simple loose fitting clothing to the center. Please do not bring valuables. If you have a living will and a durable power of  for healthcare, please bring in a copy.     You will need to bring a photo ID and insurance card    Our goal is to provide you with excellent care so if you have any questions, please contact us at the [facility] at [phone]

## 2022-08-19 ENCOUNTER — APPOINTMENT (OUTPATIENT)
Dept: INTERVENTIONAL RADIOLOGY/VASCULAR | Age: 62
End: 2022-08-19
Attending: ANESTHESIOLOGY
Payer: COMMERCIAL

## 2022-08-19 ENCOUNTER — HOSPITAL ENCOUNTER (OUTPATIENT)
Age: 62
Setting detail: OUTPATIENT SURGERY
Discharge: HOME | End: 2022-08-19
Attending: ANESTHESIOLOGY | Admitting: ANESTHESIOLOGY
Payer: COMMERCIAL

## 2022-08-19 VITALS
SYSTOLIC BLOOD PRESSURE: 130 MMHG | OXYGEN SATURATION: 97 % | HEART RATE: 60 BPM | BODY MASS INDEX: 27.6 KG/M2 | WEIGHT: 150 LBS | TEMPERATURE: 97 F | DIASTOLIC BLOOD PRESSURE: 53 MMHG | HEIGHT: 62 IN | RESPIRATION RATE: 16 BRPM

## 2022-08-19 PROCEDURE — 2500000003 HC RX 250 WO HCPCS: Performed by: ANESTHESIOLOGY

## 2022-08-19 PROCEDURE — 3610000054 HC PAIN LEVEL 3 BASE (NON-OR): Performed by: ANESTHESIOLOGY

## 2022-08-19 PROCEDURE — 2709999900 HC NON-CHARGEABLE SUPPLY: Performed by: ANESTHESIOLOGY

## 2022-08-19 PROCEDURE — 6360000002 HC RX W HCPCS: Performed by: ANESTHESIOLOGY

## 2022-08-19 RX ORDER — METHYLPREDNISOLONE ACETATE 80 MG/ML
INJECTION, SUSPENSION INTRA-ARTICULAR; INTRALESIONAL; INTRAMUSCULAR; SOFT TISSUE
Status: COMPLETED | OUTPATIENT
Start: 2022-08-19 | End: 2022-08-19

## 2022-08-19 RX ORDER — BUPIVACAINE HYDROCHLORIDE 5 MG/ML
INJECTION, SOLUTION EPIDURAL; INTRACAUDAL
Status: COMPLETED | OUTPATIENT
Start: 2022-08-19 | End: 2022-08-19

## 2022-08-19 RX ORDER — LIDOCAINE HYDROCHLORIDE 10 MG/ML
INJECTION, SOLUTION EPIDURAL; INFILTRATION; INTRACAUDAL; PERINEURAL
Status: COMPLETED | OUTPATIENT
Start: 2022-08-19 | End: 2022-08-19

## 2022-08-19 ASSESSMENT — PAIN DESCRIPTION - DESCRIPTORS
DESCRIPTORS: ACHING
DESCRIPTORS: ACHING;BURNING

## 2022-08-19 ASSESSMENT — PAIN SCALES - GENERAL
PAINLEVEL_OUTOF10: 3
PAINLEVEL_OUTOF10: 2

## 2022-08-19 ASSESSMENT — PAIN DESCRIPTION - ONSET
ONSET: ON-GOING
ONSET: ON-GOING

## 2022-08-19 ASSESSMENT — PAIN DESCRIPTION - PAIN TYPE: TYPE: ACUTE PAIN

## 2022-08-19 ASSESSMENT — PAIN - FUNCTIONAL ASSESSMENT
PAIN_FUNCTIONAL_ASSESSMENT: PREVENTS OR INTERFERES SOME ACTIVE ACTIVITIES AND ADLS
PAIN_FUNCTIONAL_ASSESSMENT: PREVENTS OR INTERFERES SOME ACTIVE ACTIVITIES AND ADLS

## 2022-08-19 ASSESSMENT — PAIN DESCRIPTION - ORIENTATION
ORIENTATION: RIGHT
ORIENTATION: RIGHT

## 2022-08-19 ASSESSMENT — PAIN DESCRIPTION - LOCATION
LOCATION: BACK
LOCATION: BACK

## 2022-08-19 ASSESSMENT — PAIN DESCRIPTION - FREQUENCY: FREQUENCY: CONTINUOUS

## 2022-08-19 ASSESSMENT — PAIN DESCRIPTION - DIRECTION
RADIATING_TOWARDS: LEG
RADIATING_TOWARDS: LEG

## 2022-08-19 NOTE — DISCHARGE INSTRUCTIONS
[facility]   [address] [place], [address] [place] [address]   [address] [facility]  [address] [facility], [address] [place] [address]  [phone]      Patient:  Jesus Pettit  YOB: 1960  Medical Record #:  [id number]   Place: [address] [place] [address]  Date:  8/19/2022   Physician:  Dana Klein MD    Procedure Performed: [unfilled]     Discharge Instructions    Notify Pain Management Services if any of the following occur:    Redness/Swelling at the injection site lasting longer than 2 days  Fever (with redness, swelling, or drainage at the injection site)  Drainage at the injection site  New weakness/ numbness  Severe headache   Loss of bowel/ bladder function    General Instructions: You may experience numbness for several hours following your treatment. You should be cautious using those areas which are numb. Once the numbness wears off, you may apply ice or heat to injection site, if needed. Do not return to work or drive today    Rest today and return to normal activities tomorrow. On average, the steroid takes about 1 week to work and can be up to 2 weeks    You should continue to depend on your primary physician for your medical management of conditions not related to your pain management treatment. Continue to take all your other medications, including blood thinners, as directed by your primary physician unless otherwise instructed. NO changes have been made to your medications. Any changes listed on the discharge are based on patient self reporting. Any EMR warnings regarding drug/drug interactions were dismissed based on current use by the patient, management by prescribing physician and pharmacist and not managed by this physician. Any problem which relates specifically to a treatment or procedure performed today should be directed to the [facility].        [facility]  Division of Interventional Pain Management  [address] [facility], [address] [place] [address], [address]  [phone]

## 2022-08-19 NOTE — H&P
Patient:  Merlin Calderon  YOB: 1960  Medical Record #:  [id number]   Place: [address]  Date:  2022   Physician:  Saulo Pacheco MD    History Obtained From: electronic medical record    HISTORY OF PRESENT ILLNESS    Past Medical History:        Diagnosis Date    HBP (high blood pressure)     Hyperlipidemia     Obstructive sleep apnea on CPAP     Plantar fasciitis     Postoperative nausea     per patient lasted 3 weeks after knee replacement surgery    Seasonal allergies      Past Surgical History:        Procedure Laterality Date    CARPAL TUNNEL RELEASE Right      SECTION  3993,7533, 1986,      SECTION      x4    COLONOSCOPY  2018    Dr. Ludivina Shepherd with polyp    FOOT NEUROMA SURGERY Right     HAND SURGERY Left     Thumb joint    HYSTERECTOMY (CERVIX STATUS UNKNOWN)      JOINT REPLACEMENT Bilateral     knees    LASIK      NERVE BLOCK Right 10/22/2021    RIGHT SCIATIC NERVE BLOCK performed by Pearl Dorado MD at [address] [facility] Right 2022    RIGHT SCIATIC NERVE BLOCK performed by Pearl Dorado MD at [address] Right 2021    LUMBAR EPIDURAL STEROID INJECTION - RIGHT L5-S1 performed by Pearl Dorado MD at [address] Right 3/25/2022    LUMBAR EPIDURAL STEROID INJECTION - RIGHT L4-5 performed by Pearl Dorado MD at [address] Right 2022    LUMBAR EPIDURAL STEROID INJECTION RIGHT L4-5 performed by Pearl Dorado MD at [address]      left    SPINAL FUSION      L4    TOTAL KNEE ARTHROPLASTY Left 2020    ROBOTIC ASSISTED LEFT TOTAL KNEE REPLACEMENT performed by Flory Hobson MD at [address] [facility] Right 2020    RIGHT TOTAL KNEE REPLACEMENT ROBOTIC ASSISTED performed by Flory Hobson MD at [place] [address]     Medications Prior to Admission:   No current facility-administered medications on file prior to encounter.      Current Outpatient Medications on File Prior to Encounter   Medication Sig Dispense Refill    atorvastatin (LIPITOR) 80 MG tablet TAKE 1 TABLET BY MOUTH ONE TIME A DAY 90 tablet 3    metoprolol tartrate (LOPRESSOR) 25 MG tablet TAKE 1 TABLET BY MOUTH 2 TIMES A  tablet 3    Armodafinil (NUVIGIL) 150 MG TABS tablet One tab QAM 30 tablet 5    cyclobenzaprine (FLEXERIL) 5 MG tablet Take 1 tablet by mouth nightly as needed for Muscle spasms 90 tablet 3    losartan-hydroCHLOROthiazide (HYZAAR) 100-12.5 MG per tablet TAKE 1 TABLET BY MOUTH ONE TIME A DAY 90 tablet 3    amitriptyline (ELAVIL) 25 MG tablet Take 1 tablet by mouth nightly 90 tablet 3    buPROPion (WELLBUTRIN XL) 150 MG extended release tablet TAKE ONE TABLET BY MOUTH EVERY MORNING 90 tablet 3    fluticasone (FLONASE) 50 MCG/ACT nasal spray 1 spray by Nasal route daily as needed        Allergies:  Vicodin [hydrocodone-acetaminophen], Lisinopril, Sansert [methysergide], and Toradol [ketorolac tromethamine]  Social History     Socioeconomic History    Marital status:      Spouse name: Not on file    Number of children: Not on file    Years of education: Not on file    Highest education level: Not on file   Occupational History    Not on file   Tobacco Use    Smoking status: Never    Smokeless tobacco: Never   Vaping Use    Vaping Use: Never used   Substance and Sexual Activity    Alcohol use: Yes     Comment: monthly, occasional drink    Drug use: No    Sexual activity: Yes     Partners: Male     Comment: hysterectomy   Other Topics Concern    Not on file   Social History Narrative    Not on file     Social Determinants of Health     Financial Resource Strain: Low Risk     Difficulty of Paying Living Expenses: Not hard at all   Food Insecurity: No Food Insecurity    Worried About Running Out of Food in the Last Year: Never true    Ran Out of Food in the Last Year: Never true   Transportation Needs: Not on file   Physical Activity: Not on file   Stress: Not on file   Social Connections: Not on file   Intimate Partner Violence: Not on file   Housing Stability: Not on file     Family History   Problem Relation Age of Onset    Arthritis Other     Asthma Other     Cancer Other     Diabetes Other     High Blood Pressure Other     Breast Cancer Mother     Other Mother          PHYSICAL EXAM:      Ht 5' 2\" (1.575 m)   Wt 150 lb (68 kg)   LMP  (LMP Unknown)   BMI 27.44 kg/m²  I            ASSESSMENT AND PLAN:    1. Procedure. options, risks and benefits reviewed with patient and expresses understanding.

## 2022-08-19 NOTE — OP NOTE
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 BRIEF HISTORY:  The patient presents today to [facility] for a scheduled sciatic nerve block procedure. The patient was re-evaluated today and is clinically unchanged as compared to my previous evaluation. The patient is clinically stable to proceed with the procedure. PROCEDURE NOTE:  The procedure was again explained to the patient and the previously distributed pre-procedure literature was reviewed. The options, rationale, and benefits of the procedure including pain relief, functional improvement, and increased mobility, as well as the risks of the procedure including but not limited to infection, bleeding, paresthesia, pain, failure to relieve pain, increased pain, headache, allergic reaction, neurologic impairment, local anesthetic, toxicity, and side effects and the potential side effects of corticosteroids were discussed with the patient and informed written consent was obtained from the patient. The patient was brought to the fluoroscopy suite and placed in the prone position. The RIGHT sacral and gluteal area was prepped in the usual sterile fashion with Chloraprep and then draped. Intermittent AP fluoroscopy was utilized to localize the radiographic landmarks. A standard perisciatic nerve block approach was used. The sciatic notch, at its superolateral border was localized at its junction with the ilium rostral to the acetabulum and lateral to the SI joint and lateral to the sciatic nerve.  It was localized at the radiographic marker of the overlying sciatic nerve and piriformis muscle obetween the lateral aspect of the sacrum and the greater trochanter. The superficial skin projection was anesthetized with buffered lidocaine 1% 2 cc using a 27-gauge needle. A spinal needle was then inserted slowly under direct intermittent AP fluoroscopy towards the ilium and the overlying piriformis muscle. Negative aspiration was re-demonstrated and therapeutic injectate consisting of 6 cc of lidocaine 1%, 1 cc of bupivacaine 0.5% and 1 cc of Depo-Medrol 40 mg/cc was injected without pain, paresthesia, difficulty, or complaints. The needle was removed, the area cleansed, a Band-Aid placed over the injection site. Patient tolerated the procedure well. There were no complications. The patient was transferred, by wheelchair or stretcher, with accompaniment to the recovery area where the vital signs remained stable. There was sensory or motor blockade in the lower extremity. This procedure was done using local anesthesia only. The patient was discharged in stable condition accompanied with an escort after fulfilling standard discharge criteria. FLUOROSCOPY:  A fluoroscopy unit was utilized to obtain fluoroscopic images for intra-procedural use and assistance. Fluoroscopy was utilized to identify anatomic and radiographic landmarks for the accompanying procedure guidance and not for diagnostic purposes.       Estimated Blood Loss: 0ml      Plan:  Follow up in 4-6 weeks      Office: [phone]

## 2022-10-08 ENCOUNTER — HOSPITAL ENCOUNTER (OUTPATIENT)
Age: 62
Discharge: HOME | End: 2022-10-08
Payer: COMMERCIAL

## 2022-10-08 ENCOUNTER — HOSPITAL ENCOUNTER (OUTPATIENT)
Dept: GENERAL RADIOLOGY | Age: 62
Discharge: HOME | End: 2022-10-08
Payer: COMMERCIAL

## 2022-10-08 DIAGNOSIS — M25.511 BILATERAL SHOULDER PAIN, UNSPECIFIED CHRONICITY: ICD-10-CM

## 2022-10-08 DIAGNOSIS — M25.512 BILATERAL SHOULDER PAIN, UNSPECIFIED CHRONICITY: ICD-10-CM

## 2022-10-08 DIAGNOSIS — M54.12 RADICULOPATHY, CERVICAL: ICD-10-CM

## 2022-10-08 PROCEDURE — 73030 X-RAY EXAM OF SHOULDER: CPT

## 2022-10-08 PROCEDURE — 72050 X-RAY EXAM NECK SPINE 4/5VWS: CPT

## 2022-10-12 ENCOUNTER — TELEPHONE (OUTPATIENT)
Dept: ORTHOPEDIC SURGERY | Age: 62
End: 2022-10-12

## 2022-10-12 NOTE — TELEPHONE ENCOUNTER
General Question     Subject: Patient is wondering if you can give her results for the cervical XR.   Patient: Melanie Rojo  Contact Number: [phone]

## 2022-11-09 NOTE — PROGRESS NOTES
[facility] ENDOSCOPY AND OUTPATIENT  PRE-PROCEDURE INSTRUCTIONS    Procedure date_11/11/2022________Arrival time__0700__________        Procedure time___0800_________       Screening questions for Pain procedures:  Do you have a current infection? ___no______  Are you currently taking an antibiotic? _no_____  Are you taking a blood thinner?__no______    It is not necessary to stop eating or drinking prior to this procedure. We would like you to take your medications for blood pressure as usual.  You may be asked to stop blood thinners such as Coumadin, Plavix, Fragmin, Lovenox, etc., or any anti-inflammatories such as:  Aspirin, Ibuprofen, Advil, Naproxen prior to your procedure. We also ask that you stop any OTC medications that cause additional bleeding    You must make arrangements for a responsible adult to arrive with you and stay in our waiting area during your procedure. They will also need to take you home after your procedure. For your safety you will not be allowed to leave alone or drive yourself home. Also for your safety, it is strongly suggested that someone stay with you the first 24 hours after your procedure. For your comfort, please wear simple loose fitting clothing to the center. Please do not bring valuables. If you have a living will and a durable power of  for healthcare, please bring in a copy.     You will need to bring a photo ID and insurance card    Our goal is to provide you with excellent care so if you have any questions, please contact us at the [facility] at [phone]

## 2022-11-11 ENCOUNTER — HOSPITAL ENCOUNTER (OUTPATIENT)
Age: 62
Setting detail: OUTPATIENT SURGERY
Discharge: HOME | End: 2022-11-11
Attending: ANESTHESIOLOGY | Admitting: ANESTHESIOLOGY
Payer: COMMERCIAL

## 2022-11-11 ENCOUNTER — APPOINTMENT (OUTPATIENT)
Dept: INTERVENTIONAL RADIOLOGY/VASCULAR | Age: 62
End: 2022-11-11
Attending: ANESTHESIOLOGY
Payer: COMMERCIAL

## 2022-11-11 VITALS
DIASTOLIC BLOOD PRESSURE: 65 MMHG | RESPIRATION RATE: 16 BRPM | TEMPERATURE: 98.1 F | BODY MASS INDEX: 27.97 KG/M2 | OXYGEN SATURATION: 98 % | HEART RATE: 91 BPM | HEIGHT: 62 IN | SYSTOLIC BLOOD PRESSURE: 124 MMHG | WEIGHT: 152 LBS

## 2022-11-11 PROCEDURE — 6360000004 HC RX CONTRAST MEDICATION: Performed by: ANESTHESIOLOGY

## 2022-11-11 PROCEDURE — 3610000054 HC PAIN LEVEL 3 BASE (NON-OR): Performed by: ANESTHESIOLOGY

## 2022-11-11 PROCEDURE — 2709999900 HC NON-CHARGEABLE SUPPLY: Performed by: ANESTHESIOLOGY

## 2022-11-11 PROCEDURE — 6360000002 HC RX W HCPCS: Performed by: ANESTHESIOLOGY

## 2022-11-11 RX ORDER — METHYLPREDNISOLONE ACETATE 80 MG/ML
INJECTION, SUSPENSION INTRA-ARTICULAR; INTRALESIONAL; INTRAMUSCULAR; SOFT TISSUE
Status: COMPLETED | OUTPATIENT
Start: 2022-11-11 | End: 2022-11-11

## 2022-11-11 ASSESSMENT — PAIN SCALES - GENERAL
PAINLEVEL_OUTOF10: 2
PAINLEVEL_OUTOF10: 2

## 2022-11-11 ASSESSMENT — PAIN DESCRIPTION - LOCATION: LOCATION: BACK

## 2022-11-11 ASSESSMENT — PAIN DESCRIPTION - ORIENTATION: ORIENTATION: LOWER

## 2022-11-11 ASSESSMENT — PAIN DESCRIPTION - DESCRIPTORS: DESCRIPTORS: PRESSURE

## 2022-11-11 NOTE — DISCHARGE INSTRUCTIONS
[address]   [address], [address] [place] [address]  [phone]      Patient:  Beto Delacruz  YOB: 1960  Medical Record #:  [id number]   Place: [address] [place] [address]  Date:  11/11/2022   Physician:  Lety Kim MD    Procedure Performed: [unfilled]     Discharge Instructions    Notify Pain Management Services if any of the following occur:    Redness/Swelling at the injection site lasting longer than 2 days  Fever (with redness, swelling, or drainage at the injection site)  Drainage at the injection site  New weakness/ numbness  Severe headache   Loss of bowel/ bladder function    General Instructions: You may experience numbness for several hours following your treatment. You should be cautious using those areas which are numb. Once the numbness wears off, you may apply ice or heat to injection site, if needed. Do not return to work or drive today    Rest today and return to normal activities tomorrow. On average, the steroid takes about 1 week to work and can be up to 2 weeks    You should continue to depend on your primary physician for your medical management of conditions not related to your pain management treatment. Continue to take all your other medications, including blood thinners, as directed by your primary physician unless otherwise instructed. NO changes have been made to your medications. Any changes listed on the discharge are based on patient self reporting. Any EMR warnings regarding drug/drug interactions were dismissed based on current use by the patient, management by prescribing physician and pharmacist and not managed by this physician. Any problem which relates specifically to a treatment or procedure performed today should be directed to the [facility].        Alan Montoya of Interventional Pain Management  [address], [address]  [phone]

## 2022-11-11 NOTE — H&P
Patient:  Megan Pulido  YOB: 1960  Medical Record #:  [id number]   Place: [address] [facility]  Date:  2022   Physician:  Nikolay Garcia MD    History Obtained From: electronic medical record    HISTORY OF PRESENT ILLNESS    Past Medical History:        Diagnosis Date    HBP (high blood pressure)     Hyperlipidemia     Obstructive sleep apnea on CPAP     Plantar fasciitis     Postoperative nausea     per patient lasted 3 weeks after knee replacement surgery    Seasonal allergies      Past Surgical History:        Procedure Laterality Date    CARPAL TUNNEL RELEASE Right      SECTION  1387,8494, 1986,      SECTION      x4    COLONOSCOPY  2018    Dr. Dennis Moncada with polyp    FOOT NEUROMA SURGERY Right     HAND SURGERY Left     Thumb joint    HYSTERECTOMY (CERVIX STATUS UNKNOWN)      JOINT REPLACEMENT Bilateral     knees    LASIK      NERVE BLOCK Right 10/22/2021    RIGHT SCIATIC NERVE BLOCK performed by Rosie Ullao MD at [address] Right 2022    RIGHT SCIATIC NERVE BLOCK performed by Rosie Ulloa MD at [address] Right 2022    RIGHT SCIATIC NERVE BLOCK performed by Rosie Ulloa MD at [address] Right 2021    LUMBAR EPIDURAL STEROID INJECTION - RIGHT L5-S1 performed by Rosie Ulloa MD at [address] Right 3/25/2022    LUMBAR EPIDURAL STEROID INJECTION - RIGHT L4-5 performed by Rosie Ulloa MD at [address] Right 2022    LUMBAR EPIDURAL STEROID INJECTION RIGHT L4-5 performed by Rosie Ulloa MD at [address]      left    SPINAL FUSION      L4    TOTAL KNEE ARTHROPLASTY Left 2020    ROBOTIC ASSISTED LEFT TOTAL KNEE REPLACEMENT performed by Fatoumata Sigala MD at [address] [facility] 12/2/2020    RIGHT TOTAL KNEE REPLACEMENT ROBOTIC ASSISTED performed by Kvng Jackson MD at [facility] [address]     Medications Prior to Admission:   No current facility-administered medications on file prior to encounter.      Current Outpatient Medications on File Prior to Encounter   Medication Sig Dispense Refill    atorvastatin (LIPITOR) 80 MG tablet TAKE 1 TABLET BY MOUTH ONE TIME A DAY 90 tablet 3    metoprolol tartrate (LOPRESSOR) 25 MG tablet TAKE 1 TABLET BY MOUTH 2 TIMES A  tablet 3    Armodafinil (NUVIGIL) 150 MG TABS tablet One tab QAM (Patient not taking: Reported on 11/11/2022) 30 tablet 5    cyclobenzaprine (FLEXERIL) 5 MG tablet Take 1 tablet by mouth nightly as needed for Muscle spasms 90 tablet 3    losartan-hydroCHLOROthiazide (HYZAAR) 100-12.5 MG per tablet TAKE 1 TABLET BY MOUTH ONE TIME A DAY 90 tablet 3    amitriptyline (ELAVIL) 25 MG tablet Take 1 tablet by mouth nightly 90 tablet 3    buPROPion (WELLBUTRIN XL) 150 MG extended release tablet TAKE ONE TABLET BY MOUTH EVERY MORNING 90 tablet 3    fluticasone (FLONASE) 50 MCG/ACT nasal spray 1 spray by Nasal route daily as needed        Allergies:  Vicodin [hydrocodone-acetaminophen], Lisinopril, Sansert [methysergide], and Toradol [ketorolac tromethamine]  Social History     Socioeconomic History    Marital status:      Spouse name: Not on file    Number of children: Not on file    Years of education: Not on file    Highest education level: Not on file   Occupational History    Not on file   Tobacco Use    Smoking status: Never    Smokeless tobacco: Never   Vaping Use    Vaping Use: Never used   Substance and Sexual Activity    Alcohol use: Yes     Comment: monthly, occasional drink    Drug use: No    Sexual activity: Yes     Partners: Male     Comment: hysterectomy   Other Topics Concern    Not on file   Social History Narrative    Not on file     Social Determinants of Health     Financial Resource Strain: Not on file   Food Insecurity: Not on file   Transportation Needs: Not on file   Physical Activity: Not on file   Stress: Not on file   Social Connections: Not on file   Intimate Partner Violence: Not on file   Housing Stability: Not on file     Family History   Problem Relation Age of Onset    Arthritis Other     Asthma Other     Cancer Other     Diabetes Other     High Blood Pressure Other     Breast Cancer Mother     Other Mother          PHYSICAL EXAM:      BP (!) 150/80   Pulse (!) 102   Temp (!) 96.3 °F (35.7 °C) (Temporal)   Resp 16   Ht 5' 2\" (1.575 m)   Wt 152 lb (68.9 kg)   LMP  (LMP Unknown)   SpO2 96%   BMI 27.80 kg/m²  I            ASSESSMENT AND PLAN:    1. Procedure. options, risks and benefits reviewed with patient and expresses understanding.

## 2022-11-11 NOTE — OP NOTE
Patient:  Simran Judge  YOB: 1960  Medical Record #:  [id number]   Place: [address] [facility]  Date:  11/11/2022   Physician:  Gomez Simmonds, MD    PRE-PROCEDURE DIAGNOSIS: M54.17    POST-PROCEDURE DIAGNOSIS: M54.17    PROCEDURE:  Midline interlaminar right L5-S1 epidural steroid injection with fluoroscopy and epidurography. BRIEF HISTORY:  The patient presents today to [facility] for a scheduled lumbar epidural steroid injection procedure. The patient was re-evaluated today and is clinically unchanged as compared to my previous evaluation. The patient is clinically stable to proceed with the procedure. PROCEDURE NOTE:  The procedure was again explained to the patient and the previously distributed pre-procedure literature was reviewed. The options, rationale, and benefits of the procedure including pain relief, functional improvement, and increased mobility, as well as the risks of the procedure including but not limited to infection, bleeding, paresthesia, pain, failure to relieve pain, increased pain, headache, allergic reaction, neurologic impairment, local anesthetic, toxicity, and side effects and the potential side effects of corticosteroids were discussed with the patient and informed written consent was obtained from the patient. The patient was positioned in the prone position on the fluoroscopy table. The skin overlying the lumbosacral vertebrae was prepped using Chloraprep and draped in the usual sterile fashion. The L5-S1 lumbar intervertebral level was identified using intermittent AP fluoroscopy. The previously identified projection of overlying skin was anesthetized using 2 cc of buffered 1% lidocaine with a 27 gauge needle. A 3.5\" 22g Touhy needle was advanced through a small skin nick in the AP view towards the interlaminar and epidural space. The epidural space was easily identified using loss of resistance to saline.   No difficulty, paresthesia or occurrence of pain was encountered. Careful aspiration was negative for CSF and blood. A total of 1 cc Isovue 300 was injected yielding an epidurogram.    FLUOROSCOPY:  A fluoroscopy unit was utilized to obtain fluoroscopic images for intra-procedural use and assistance. Fluoroscopy was utilized to identify anatomic and radiographic landmarks for the accompanying procedure guidance and not for diagnostic purposes. After negative aspiration 4 cc of therapeutic injectate containing 1 cc of Depo-Medrol 80 mg/cc and 3 ml of lidocaine 1% was injected slowly in aliquots while clinically observing and monitoring the patient with negative aspiration demonstrated between aliquots of injections. At this time no paresthesia or occurrence of pain was present. The needle was then removed, the area was cleansed and a Band-Aid was placed over the injection site. There were no complications. The patient tolerated the procedure well. The procedure was performed using local anesthesia. The patient was transferred by wheelchair with accompaniment to the  Recovery Area and was monitored per protocol. The vital signs remained stable. The patient was discharged in stable condition accompanied by an escort with written instructions after fulfilling the standard discharge criteria. Written follow up instructions were given to the patient.     Estimated Blood Loss: 0ml    Plan:  Follow up in 6-8 weeks    Office: [phone]

## 2022-12-02 ENCOUNTER — TELEMEDICINE (OUTPATIENT)
Dept: FAMILY MEDICINE CLINIC | Age: 62
End: 2022-12-02
Payer: COMMERCIAL

## 2022-12-02 ENCOUNTER — TELEPHONE (OUTPATIENT)
Dept: FAMILY MEDICINE CLINIC | Age: 62
End: 2022-12-02

## 2022-12-02 DIAGNOSIS — U07.1 COVID: Primary | ICD-10-CM

## 2022-12-02 PROCEDURE — 99213 OFFICE O/P EST LOW 20 MIN: CPT | Performed by: FAMILY MEDICINE

## 2022-12-02 RX ORDER — DEXTROMETHORPHAN HYDROBROMIDE AND PROMETHAZINE HYDROCHLORIDE 15; 6.25 MG/5ML; MG/5ML
5 SYRUP ORAL 4 TIMES DAILY PRN
Qty: 100 ML | Refills: 0 | Status: SHIPPED | OUTPATIENT
Start: 2022-12-02 | End: 2022-12-07

## 2022-12-02 NOTE — TELEPHONE ENCOUNTER
Pt is calling because she feels she has a head cold asking ofr medication to be called in. Advised covid test and call back.      Onset- yesterday    Symptoms  Congestion  Sore throat  Headache    Otc- Theraflu, motrin and tylenol    Covid test- not yet taken

## 2022-12-02 NOTE — TELEPHONE ENCOUNTER
Pt is calling back stating that she is positive for covid. She is asking if something can be called in.

## 2022-12-02 NOTE — PROGRESS NOTES
2022    Lyssa Valentino (:  1960) is a 60 y.o. female, here for evaluation of the following chief complaint(s):  No chief complaint on file. ASSESSMENT/PLAN:     Diagnosis Orders   1. COVID      hold lipitor; Bupropion; phen DM and Paxlovid; call INB          Return if symptoms worsen or fail to improve. An electronic signature was used to authenticate this note. SUBJECTIVE/OBJECTIVE:  (NOTE : prior results listed below reviewed at this visit to assist in medical decision making.)    HPI / ROS    # COVID positive x 2 days + sore throat (better)  mild cough;      Lab Results   Component Value Date/Time     10/08/2021 08:19 AM    K 4.0 10/08/2021 08:19 AM     10/08/2021 08:19 AM    CO2 28 10/08/2021 08:19 AM    BUN 20 10/08/2021 08:19 AM    CREATININE 0.7 10/08/2021 08:19 AM    GLUCOSE 82 10/08/2021 08:19 AM    CALCIUM 9.3 10/08/2021 08:19 AM              Wt Readings from Last 3 Encounters:   22 152 lb (68.9 kg)   22 150 lb (68 kg)   22 151 lb (68.5 kg)       BP Readings from Last 3 Encounters:   22 124/65   22 (!) 130/53   22 (!) 148/68           TELEHEALTH EVALUATION -- Audio/Visual ([facility])      Lyssa Valentino (:  1960) is being evaluated by a Virtual Visit (video visit) encounter to address concerns as mentioned above. A caregiver was present when appropriate. Due to this being a TeleHealth encounter (During [facility]- public health emergency), evaluation of the following organ systems was limited: Vitals/Constitutional/EENT/Resp/CV/GI//MS/Neuro/Skin/Heme-Lymph-Imm.   Pursuant to the emergency declaration under the [facility], [address] [place] [address] authority and the [employer] and [employer] Act, this Virtual Visit was conducted with patient's (and/or legal guardian's) consent, to reduce the patient's risk of exposure to COVID-19 and provide necessary medical care. The patient (and/or legal guardian) has also been advised to contact this office for worsening conditions or problems, and seek emergency medical treatment and/or call 911 if deemed necessary. Patient identification was verified at the start of the visit: Yes    Total time spent on this encounter: Not billed by time    Services were provided through a video synchronous discussion virtually to substitute for in-person clinic visit. Patient and provider were located at their individual homes. --Sreedhar Crespo MD on 12/2/2022 at 1:19 PM    An electronic signature was used to authenticate this note.

## 2022-12-07 NOTE — PROGRESS NOTES
Nikole Aguilar         : 1960        PHONE: [phone] (home) [phone] (work)  [x] [address]     [] [address]      [] [facility]     [] [facility]    [] Teressa Born  [] [facility]   [] [address] [place] [address]  [] [facility] [] Other:     Diagnosis: [x] DAVID (G47.33) [] CSA (G47.31) [] Apnea (G47.30)   Length of Need: [] 12 Months [x] 99 Months [] Other:    Machine (CASSANDRA!): [] Respironics Dream Station      Auto [] ResMed AirSense     Auto [] Other:     []  CPAP () [] Bilevel ()   Mode: [] Auto [] Spontaneous    Mode: [] Auto [] Spontaneous                            Comfort Settings:   - Ramp Pressure: 5 cmH2O                                        - Ramp time: 15 min                                     -  Flex/EPR - 3 full time                                    - For ResMed Bilevel (TiMax-4 sec   TiMin- 0.2 sec)     Humidifier: [] Heated ()        [x] Water chamber replacement ()/ 1 per 6 months        Mask:   [x] Nasal () /1 per 3 months [] Full Face () /1 per 3 months   [x] Patient choice -Size and fit mask [] Patient Choice - Size and fit mask   [] Dispense:  [] Dispense:    [x] Headgear () / 1 per 3 months [] Headgear () / 1 per 3 months   [x] Replacement Nasal Cushion ()/2 per month [] Interface Replacement ()/1 per month   [x] Replacement Nasal Pillows ()/2 per month         Tubing: [x] Heated ()/1 per 3 months    [] Standard ()/1 per 3 months [] Other:           Filters: [x] Non-disposable ()/1 per 6 months     [x] Ultra-Fine, Disposable ()/2 per month        Miscellaneous: [x] Chin Strap ()/ 1 per 6 months [] O2 bleed-in:       LPM   [] Oximetry on CPAP/Bilevel []  Other:          Start Order Date: 22    MEDICAL JUSTIFICATION:  I, the undersigned, certify that the above prescribed supplies are medically necessary for this patients wellbeing.   In my opinion, the supplies are both reasonable and necessary in reference to accepted standards of medicalpractice in treatment of this patients condition.     Noemy Edwards MD      NPI: [id number]       Order Signed Date: 12/07/22    Electronically signed by Noemy Edwards MD on 12/7/2022 at 12:52 PM

## 2022-12-14 NOTE — PROGRESS NOTES
[facility] ENDOSCOPY AND OUTPATIENT  PRE-PROCEDURE INSTRUCTIONS    Procedure date__12/16/2022_______Arrival time__0715__________        Procedure time____0815________       Screening questions for Pain procedures:  Do you have a current infection? __no_______  Are you currently taking an antibiotic? _no_____  Are you taking a blood thinner?___no_____    It is not necessary to stop eating or drinking prior to this procedure. We would like you to take your medications for blood pressure as usual.  You may be asked to stop blood thinners such as Coumadin, Plavix, Fragmin, Lovenox, etc., or any anti-inflammatories such as:  Aspirin, Ibuprofen, Advil, Naproxen prior to your procedure. We also ask that you stop any OTC medications that cause additional bleeding    You must make arrangements for a responsible adult to arrive with you and stay in our waiting area during your procedure. They will also need to take you home after your procedure. For your safety you will not be allowed to leave alone or drive yourself home. Also for your safety, it is strongly suggested that someone stay with you the first 24 hours after your procedure. For your comfort, please wear simple loose fitting clothing to the center. Please do not bring valuables. If you have a living will and a durable power of  for healthcare, please bring in a copy.     You will need to bring a photo ID and insurance card    Our goal is to provide you with excellent care so if you have any questions, please contact us at the [facility] at [phone]

## 2022-12-16 ENCOUNTER — HOSPITAL ENCOUNTER (OUTPATIENT)
Age: 62
Setting detail: OUTPATIENT SURGERY
Discharge: HOME | End: 2022-12-16
Attending: ANESTHESIOLOGY | Admitting: ANESTHESIOLOGY
Payer: COMMERCIAL

## 2022-12-16 ENCOUNTER — APPOINTMENT (OUTPATIENT)
Dept: INTERVENTIONAL RADIOLOGY/VASCULAR | Age: 62
End: 2022-12-16
Attending: ANESTHESIOLOGY
Payer: COMMERCIAL

## 2022-12-16 VITALS
BODY MASS INDEX: 27.6 KG/M2 | RESPIRATION RATE: 14 BRPM | DIASTOLIC BLOOD PRESSURE: 96 MMHG | HEIGHT: 62 IN | TEMPERATURE: 97.7 F | OXYGEN SATURATION: 96 % | WEIGHT: 150 LBS | HEART RATE: 71 BPM | SYSTOLIC BLOOD PRESSURE: 131 MMHG

## 2022-12-16 PROCEDURE — 2709999900 HC NON-CHARGEABLE SUPPLY: Performed by: ANESTHESIOLOGY

## 2022-12-16 PROCEDURE — 2500000003 HC RX 250 WO HCPCS: Performed by: ANESTHESIOLOGY

## 2022-12-16 PROCEDURE — 6360000002 HC RX W HCPCS: Performed by: ANESTHESIOLOGY

## 2022-12-16 PROCEDURE — 3610000054 HC PAIN LEVEL 3 BASE (NON-OR): Performed by: ANESTHESIOLOGY

## 2022-12-16 RX ORDER — BUPIVACAINE HYDROCHLORIDE 5 MG/ML
INJECTION, SOLUTION EPIDURAL; INTRACAUDAL
Status: COMPLETED | OUTPATIENT
Start: 2022-12-16 | End: 2022-12-16

## 2022-12-16 RX ORDER — LIDOCAINE HYDROCHLORIDE 10 MG/ML
INJECTION, SOLUTION EPIDURAL; INFILTRATION; INTRACAUDAL; PERINEURAL
Status: COMPLETED | OUTPATIENT
Start: 2022-12-16 | End: 2022-12-16

## 2022-12-16 RX ORDER — METHYLPREDNISOLONE ACETATE 80 MG/ML
INJECTION, SUSPENSION INTRA-ARTICULAR; INTRALESIONAL; INTRAMUSCULAR; SOFT TISSUE
Status: COMPLETED | OUTPATIENT
Start: 2022-12-16 | End: 2022-12-16

## 2022-12-16 ASSESSMENT — PAIN - FUNCTIONAL ASSESSMENT: PAIN_FUNCTIONAL_ASSESSMENT: NONE - DENIES PAIN

## 2022-12-16 ASSESSMENT — PAIN SCALES - GENERAL
PAINLEVEL_OUTOF10: 0
PAINLEVEL_OUTOF10: 0

## 2022-12-16 NOTE — H&P
Patient:  Kylee Burns  YOB: 1960  Medical Record #:  [id number]   Place: [address] [facility]  Date:  2022   Physician:  Jakob Giraldo MD    History Obtained From: electronic medical record    HISTORY OF PRESENT ILLNESS    Past Medical History:        Diagnosis Date    HBP (high blood pressure)     Hyperlipidemia     Obstructive sleep apnea on CPAP     Plantar fasciitis     Postoperative nausea     per patient lasted 3 weeks after knee replacement surgery    Seasonal allergies      Past Surgical History:        Procedure Laterality Date    CARPAL TUNNEL RELEASE Right      SECTION  2911,8694, ,      SECTION      x4    COLONOSCOPY  2018    Dr. Candie Abad with polyp    FOOT NEUROMA SURGERY Right     HAND SURGERY Left     Thumb joint    HYSTERECTOMY (CERVIX STATUS UNKNOWN)      JOINT REPLACEMENT Bilateral     knees    LASIK      NERVE BLOCK Right 10/22/2021    RIGHT SCIATIC NERVE BLOCK performed by Jonathan Al MD at [address] Right 2022    RIGHT SCIATIC NERVE BLOCK performed by Jonathan Al MD at [address] Right 2022    RIGHT SCIATIC NERVE BLOCK performed by Jonathan Al MD at [address] Right 2021    LUMBAR EPIDURAL STEROID INJECTION - RIGHT L5-S1 performed by Jonathan Al MD at [address] Right 3/25/2022    LUMBAR EPIDURAL STEROID INJECTION - RIGHT L4-5 performed by Jonathan Al MD at [address] Right 2022    LUMBAR EPIDURAL STEROID INJECTION RIGHT L4-5 performed by Jonathan Al MD at [address] Right 2022    LUMBAR EPIDURAL STEROID INJECTION RIGHT L5-S1 performed by Jonathan Al MD at [address]      left    SPINAL FUSION      L4    TOTAL KNEE ARTHROPLASTY Left 5/26/2020    ROBOTIC ASSISTED LEFT TOTAL KNEE REPLACEMENT performed by Verna Pineda MD at [facility] Right 12/2/2020    RIGHT TOTAL KNEE REPLACEMENT ROBOTIC ASSISTED [facility] [address]     Medications Prior to Admission:   No current facility-administered medications on file prior to encounter.      Current Outpatient Medications on File Prior to Encounter   Medication Sig Dispense Refill    atorvastatin (LIPITOR) 80 MG tablet TAKE 1 TABLET BY MOUTH ONE TIME A DAY 90 tablet 3    metoprolol tartrate (LOPRESSOR) 25 MG tablet TAKE 1 TABLET BY MOUTH 2 TIMES A  tablet 3    cyclobenzaprine (FLEXERIL) 5 MG tablet Take 1 tablet by mouth nightly as needed for Muscle spasms 90 tablet 3    losartan-hydroCHLOROthiazide (HYZAAR) 100-12.5 MG per tablet TAKE 1 TABLET BY MOUTH ONE TIME A DAY 90 tablet 3    amitriptyline (ELAVIL) 25 MG tablet Take 1 tablet by mouth nightly 90 tablet 3    buPROPion (WELLBUTRIN XL) 150 MG extended release tablet TAKE ONE TABLET BY MOUTH EVERY MORNING 90 tablet 3    fluticasone (FLONASE) 50 MCG/ACT nasal spray 1 spray by Nasal route daily as needed        Allergies:  Vicodin [hydrocodone-acetaminophen], Lisinopril, Sansert [methysergide], and Toradol [ketorolac tromethamine]  Social History     Socioeconomic History    Marital status:      Spouse name: Not on file    Number of children: Not on file    Years of education: Not on file    Highest education level: Not on file   Occupational History    Not on file   Tobacco Use    Smoking status: Never    Smokeless tobacco: Never   Vaping Use    Vaping Use: Never used   Substance and Sexual Activity    Alcohol use: Yes     Comment: monthly, occasional drink    Drug use: No    Sexual activity: Yes     Partners: Male     Comment: hysterectomy   Other Topics Concern    Not on file   Social History Narrative    Not on file     Social Determinants of Health     Financial

## 2022-12-16 NOTE — OP NOTE
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 BRIEF HISTORY:  The patient presents today to [facility] for a scheduled sciatic nerve block procedure. The patient was re-evaluated today and is clinically unchanged as compared to my previous evaluation. The patient is clinically stable to proceed with the procedure. PROCEDURE NOTE:  The procedure was again explained to the patient and the previously distributed pre-procedure literature was reviewed. The options, rationale, and benefits of the procedure including pain relief, functional improvement, and increased mobility, as well as the risks of the procedure including but not limited to infection, bleeding, paresthesia, pain, failure to relieve pain, increased pain, headache, allergic reaction, neurologic impairment, local anesthetic, toxicity, and side effects and the potential side effects of corticosteroids were discussed with the patient and informed written consent was obtained from the patient. The patient was brought to the fluoroscopy suite and placed in the prone position. The LEFT sacral and gluteal area was prepped in the usual sterile fashion with Chloraprep and then draped. Intermittent AP fluoroscopy was utilized to localize the radiographic landmarks. A standard perisciatic nerve block approach was used. The sciatic notch, at its superolateral border was localized at its junction with the ilium rostral to the acetabulum and lateral to the SI joint and lateral to the sciatic nerve. It was localized at the radiographic marker of the overlying sciatic nerve between the lateral aspect of the sacrum and the greater trochanter.    The superficial skin projection was anesthetized with buffered lidocaine 1% 2 cc using a 27-gauge needle. A spinal needle was then inserted slowly under direct intermittent AP fluoroscopy towards the ilium and the overlying piriformis muscle. Negative aspiration was re-demonstrated and therapeutic injectate consisting of 6 cc of lidocaine 1%, 1 cc of bupivacaine 0.5% and 1 cc of Depo-Medrol 40 mg/cc was injected without pain, paresthesia, difficulty, or complaints. The needle was removed, the area cleansed, a Band-Aid placed over the injection site. Patient tolerated the procedure well. There were no complications. The patient was transferred, by wheelchair or stretcher, with accompaniment to the recovery area where the vital signs remained stable. There was sensory or motor blockade in the lower extremity. This procedure was done using local anesthesia only. The patient was discharged in stable condition accompanied with an escort after fulfilling standard discharge criteria. FLUOROSCOPY:  A fluoroscopy unit was utilized to obtain fluoroscopic images for intra-procedural use and assistance. Fluoroscopy was utilized to identify anatomic and radiographic landmarks for the accompanying procedure guidance and not for diagnostic purposes.       Estimated Blood Loss: 0ml      Plan:  Follow up in 4-6 weeks      Office: [phone]

## 2022-12-16 NOTE — DISCHARGE INSTRUCTIONS
[facility]   [address] [place], [address] [place] [address]  [phone]      Patient:  Nyasia Lester  YOB: 1960  Medical Record #:  [id number]   Date:  12/16/2022   Physician:  Corie Santos MD    Procedure Performed: [unfilled]     Discharge Instructions    Notify Pain Management Services if any of the following occur:    Redness/Swelling at the injection site lasting longer than 2 days  Fever (with redness, swelling, or drainage at the injection site)  Drainage at the injection site  New weakness/ numbness  Severe headache   Loss of bowel/ bladder function    General Instructions: You may experience numbness for several hours following your treatment. You should be cautious using those areas which are numb. Once the numbness wears off, you may apply ice or heat to injection site, if needed. Do not return to work or drive today    Rest today and return to normal activities tomorrow. On average, the steroid takes about 1 week to work and can be up to 2 weeks    You should continue to depend on your primary physician for your medical management of conditions not related to your pain management treatment. Continue to take all your other medications, including blood thinners, as directed by your primary physician unless otherwise instructed. NO changes have been made to your medications. Any changes listed on the discharge are based on patient self reporting. Any EMR warnings regarding drug/drug interactions were dismissed based on current use by the patient, management by prescribing physician and pharmacist and not managed by this physician. Any problem which relates specifically to a treatment or procedure performed today should be directed to the [facility].        Alan Montoya of Interventional [facility]  [address] [facility], [address] [place] [address], [address]  [phone]

## 2022-12-20 RX ORDER — BUPROPION HYDROCHLORIDE 150 MG/1
TABLET ORAL
Qty: 90 TABLET | Refills: 3 | Status: SHIPPED | OUTPATIENT
Start: 2022-12-20

## 2022-12-20 NOTE — TELEPHONE ENCOUNTER
Medication:   Requested Prescriptions     Pending Prescriptions Disp Refills    buPROPion (WELLBUTRIN XL) 150 MG extended release tablet 90 tablet 3     Sig: TAKE ONE TABLET BY MOUTH EVERY MORNING       Last Filled:      Patient Phone Number: [phone] (home) [phone] (work)    Last appt: 12/2/2022   Next appt: 12/28/2022

## 2022-12-23 ENCOUNTER — TELEMEDICINE (OUTPATIENT)
Dept: FAMILY MEDICINE CLINIC | Age: 62
End: 2022-12-23
Payer: COMMERCIAL

## 2022-12-23 DIAGNOSIS — F34.1 DYSTHYMIA: Primary | ICD-10-CM

## 2022-12-23 PROCEDURE — 99213 OFFICE O/P EST LOW 20 MIN: CPT | Performed by: FAMILY MEDICINE

## 2022-12-23 ASSESSMENT — PATIENT HEALTH QUESTIONNAIRE - PHQ9
SUM OF ALL RESPONSES TO PHQ QUESTIONS 1-9: 15
6. FEELING BAD ABOUT YOURSELF - OR THAT YOU ARE A FAILURE OR HAVE LET YOURSELF OR YOUR FAMILY DOWN: 0
5. POOR APPETITE OR OVEREATING: 2
2. FEELING DOWN, DEPRESSED OR HOPELESS: 3
10. IF YOU CHECKED OFF ANY PROBLEMS, HOW DIFFICULT HAVE THESE PROBLEMS MADE IT FOR YOU TO DO YOUR WORK, TAKE CARE OF THINGS AT HOME, OR GET ALONG WITH OTHER PEOPLE: 2
9. THOUGHTS THAT YOU WOULD BE BETTER OFF DEAD, OR OF HURTING YOURSELF: 0
7. TROUBLE CONCENTRATING ON THINGS, SUCH AS READING THE NEWSPAPER OR WATCHING TELEVISION: 3
SUM OF ALL RESPONSES TO PHQ QUESTIONS 1-9: 15
SUM OF ALL RESPONSES TO PHQ QUESTIONS 1-9: 15
3. TROUBLE FALLING OR STAYING ASLEEP: 2
SUM OF ALL RESPONSES TO PHQ QUESTIONS 1-9: 15
SUM OF ALL RESPONSES TO PHQ9 QUESTIONS 1 & 2: 6
4. FEELING TIRED OR HAVING LITTLE ENERGY: 2
8. MOVING OR SPEAKING SO SLOWLY THAT OTHER PEOPLE COULD HAVE NOTICED. OR THE OPPOSITE, BEING SO FIGETY OR RESTLESS THAT YOU HAVE BEEN MOVING AROUND A LOT MORE THAN USUAL: 0
1. LITTLE INTEREST OR PLEASURE IN DOING THINGS: 3

## 2022-12-23 NOTE — PROGRESS NOTES
risk of exposure to COVID-19 and provide necessary medical care. The patient (and/or legal guardian) has also been advised to contact this office for worsening conditions or problems, and seek emergency medical treatment and/or call 911 if deemed necessary. Patient identification was verified at the start of the visit: Yes    Total time spent on this encounter: Not billed by time    Services were provided through a video synchronous discussion virtually to substitute for in-person clinic visit. Patient and provider were located at their individual homes. --Alicia Starr MD on 12/23/2022 at 11:58 AM    An electronic signature was used to authenticate this note.

## 2022-12-28 ENCOUNTER — OFFICE VISIT (OUTPATIENT)
Dept: FAMILY MEDICINE CLINIC | Age: 62
End: 2022-12-28
Payer: COMMERCIAL

## 2022-12-28 VITALS
DIASTOLIC BLOOD PRESSURE: 74 MMHG | HEART RATE: 59 BPM | WEIGHT: 153 LBS | OXYGEN SATURATION: 98 % | SYSTOLIC BLOOD PRESSURE: 132 MMHG | BODY MASS INDEX: 27.98 KG/M2

## 2022-12-28 DIAGNOSIS — F34.1 DYSTHYMIA: ICD-10-CM

## 2022-12-28 DIAGNOSIS — Z23 NEED FOR DIPHTHERIA-TETANUS-PERTUSSIS (TDAP) VACCINE: ICD-10-CM

## 2022-12-28 DIAGNOSIS — E78.2 MIXED HYPERLIPIDEMIA: ICD-10-CM

## 2022-12-28 DIAGNOSIS — I10 ESSENTIAL HYPERTENSION: ICD-10-CM

## 2022-12-28 DIAGNOSIS — Z13.1 DIABETES MELLITUS SCREENING: ICD-10-CM

## 2022-12-28 DIAGNOSIS — Z12.11 SCREEN FOR COLON CANCER: ICD-10-CM

## 2022-12-28 DIAGNOSIS — Z00.00 ROUTINE GENERAL MEDICAL EXAMINATION AT A HEALTH CARE FACILITY: Primary | ICD-10-CM

## 2022-12-28 LAB
A/G RATIO: 2 (ref 1.1–2.2)
ALBUMIN SERPL-MCNC: 4.5 G/DL (ref 3.4–5)
ALP BLD-CCNC: 72 U/L (ref 40–129)
ALT SERPL-CCNC: 40 U/L (ref 10–40)
ANION GAP SERPL CALCULATED.3IONS-SCNC: 11 MMOL/L (ref 3–16)
AST SERPL-CCNC: 20 U/L (ref 15–37)
BILIRUB SERPL-MCNC: 0.5 MG/DL (ref 0–1)
BUN BLDV-MCNC: 18 MG/DL (ref 7–20)
CALCIUM SERPL-MCNC: 9.5 MG/DL (ref 8.3–10.6)
CHLORIDE BLD-SCNC: 103 MMOL/L (ref 99–110)
CHOLESTEROL, TOTAL: 188 MG/DL (ref 0–199)
CO2: 29 MMOL/L (ref 21–32)
CREAT SERPL-MCNC: 0.7 MG/DL (ref 0.6–1.2)
ESTIMATED AVERAGE GLUCOSE: 111.2 MG/DL
GFR SERPL CREATININE-BSD FRML MDRD: >60 ML/MIN/{1.73_M2}
GLUCOSE BLD-MCNC: 91 MG/DL (ref 70–99)
HBA1C MFR BLD: 5.5 %
HDLC SERPL-MCNC: 62 MG/DL (ref 40–60)
LDL CHOLESTEROL CALCULATED: 93 MG/DL
POTASSIUM SERPL-SCNC: 4 MMOL/L (ref 3.5–5.1)
SODIUM BLD-SCNC: 143 MMOL/L (ref 136–145)
TOTAL PROTEIN: 6.7 G/DL (ref 6.4–8.2)
TRIGL SERPL-MCNC: 167 MG/DL (ref 0–150)
VLDLC SERPL CALC-MCNC: 33 MG/DL

## 2022-12-28 PROCEDURE — 90471 IMMUNIZATION ADMIN: CPT | Performed by: FAMILY MEDICINE

## 2022-12-28 PROCEDURE — 90715 TDAP VACCINE 7 YRS/> IM: CPT | Performed by: FAMILY MEDICINE

## 2022-12-28 PROCEDURE — 3078F DIAST BP <80 MM HG: CPT | Performed by: FAMILY MEDICINE

## 2022-12-28 PROCEDURE — 36415 COLL VENOUS BLD VENIPUNCTURE: CPT | Performed by: FAMILY MEDICINE

## 2022-12-28 PROCEDURE — 99396 PREV VISIT EST AGE 40-64: CPT | Performed by: FAMILY MEDICINE

## 2022-12-28 PROCEDURE — 3074F SYST BP LT 130 MM HG: CPT | Performed by: FAMILY MEDICINE

## 2022-12-28 SDOH — ECONOMIC STABILITY: FOOD INSECURITY: WITHIN THE PAST 12 MONTHS, THE FOOD YOU BOUGHT JUST DIDN'T LAST AND YOU DIDN'T HAVE MONEY TO GET MORE.: NEVER TRUE

## 2022-12-28 SDOH — ECONOMIC STABILITY: FOOD INSECURITY: WITHIN THE PAST 12 MONTHS, YOU WORRIED THAT YOUR FOOD WOULD RUN OUT BEFORE YOU GOT MONEY TO BUY MORE.: NEVER TRUE

## 2022-12-28 ASSESSMENT — SOCIAL DETERMINANTS OF HEALTH (SDOH): HOW HARD IS IT FOR YOU TO PAY FOR THE VERY BASICS LIKE FOOD, HOUSING, MEDICAL CARE, AND HEATING?: VERY HARD

## 2022-12-28 NOTE — PROGRESS NOTES
2022    Herber Espinosa (:  1960) is a 60 y.o. female, here for evaluation of the following chief complaint(s): Annual Exam      ASSESSMENT/PLAN:     Diagnosis Orders   1. Routine general medical examination at a health care facility        2. Diabetes mellitus screening  Hemoglobin A1C      3. Screen for colon cancer        4. Mixed hyperlipidemia  Comprehensive Metabolic Panel    LFts lipids on statin cont      5. Essential hypertension  Comprehensive Metabolic Panel    Lipid Panel    at goal cont meds chec renal      6. Dysthymia      likes current dose bupropion and we discuss getting EAP and therapy      7. Need for diphtheria-tetanus-pertussis (Tdap) vaccine  Tdap, BOOSTRIX, (age 8 yrs+), IM          Return in about 6 months (around 2023) for Hyperlipidemia, HTN. An electronic signature was used to authenticate this note. SUBJECTIVE/OBJECTIVE:  (NOTE : prior results listed below reviewed at this visit to assist in medical decision making.)    HPI / ROS    # Preventive and other issues  # Screen for diabetes  Hemoglobin A1C   Date Value Ref Range Status   2022 5.5 See comment % Final     Comment:     Comment:  Diagnosis of Diabetes: > or = 6.5%  Increased risk of diabetes (Prediabetes): 5.7-6.4%  Glycemic Control: Nonpregnant Adults: <7.0%                    Pregnant: <6.0%             # Depression - denies SI.  We discussed her therapy for personal stresses and recent meds    # Hyperlipidemia on statin rigoberto this no myalgias or weakness  Lab Results   Component Value Date    LDLCALC 93 2022      Lab Results   Component Value Date    ALT 40 2022    AST 20 2022    ALKPHOS 72 2022    BILITOT 0.5 2022      # HTN - rigoberto meds no CP/SOB  BP Readings from Last 3 Encounters:   22 132/74   22 (!) 131/96   22 124/65     Lab Results   Component Value Date/Time     2022 09:56 AM    K 4.0 2022 09:56 AM     2022 09:56 AM    CO2 29 12/28/2022 09:56 AM    BUN 18 12/28/2022 09:56 AM    CREATININE 0.7 12/28/2022 09:56 AM    GLUCOSE 91 12/28/2022 09:56 AM    CALCIUM 9.5 12/28/2022 09:56 AM               Wt Readings from Last 3 Encounters:   12/28/22 153 lb (69.4 kg)   12/16/22 150 lb (68 kg)   11/11/22 152 lb (68.9 kg)       BP Readings from Last 3 Encounters:   12/28/22 132/74   12/16/22 (!) 131/96   11/11/22 124/65       PHYSICAL EXAM  Vitals:    12/28/22 0910   BP: 132/74   Site: Left Upper Arm   Position: Sitting   Cuff Size: Medium Adult   Pulse: 59   SpO2: 98%   Weight: 153 lb (69.4 kg)     A&o  Neck no TMG no bruit  Car reg no MGR  Lungs cta  Ext no edema  Skin no jaundice  Eyes anicteric

## 2023-01-09 RX ORDER — LOSARTAN POTASSIUM AND HYDROCHLOROTHIAZIDE 12.5; 1 MG/1; MG/1
TABLET ORAL
Qty: 90 TABLET | Refills: 1 | Status: SHIPPED | OUTPATIENT
Start: 2023-01-09

## 2023-01-09 NOTE — TELEPHONE ENCOUNTER
Medication:   Requested Prescriptions     Pending Prescriptions Disp Refills    losartan-hydroCHLOROthiazide (HYZAAR) 100-12.5 MG per tablet [Pharmacy Med Name: Therese Vaughn 100-12.5 TABS] 90 tablet 1     Sig: TAKE 1 TABLET BY MOUTH ONE TIME A DAY       Last Filled:      Patient Phone Number: [phone] (home) [phone] (work)    Last appt: 12/28/2022   Next appt: 6/30/2023

## 2023-01-16 RX ORDER — LOSARTAN POTASSIUM AND HYDROCHLOROTHIAZIDE 12.5; 1 MG/1; MG/1
TABLET ORAL
Qty: 90 TABLET | Refills: 1 | OUTPATIENT
Start: 2023-01-16

## 2023-01-16 RX ORDER — AMITRIPTYLINE HYDROCHLORIDE 25 MG/1
TABLET, FILM COATED ORAL
Qty: 90 TABLET | Refills: 3 | Status: SHIPPED | OUTPATIENT
Start: 2023-01-16

## 2023-01-16 NOTE — TELEPHONE ENCOUNTER
Medication:   Requested Prescriptions     Pending Prescriptions Disp Refills    amitriptyline (ELAVIL) 25 MG tablet [Pharmacy Med Name: AMITRIPTYLINE HCL 25MG TABS] 90 tablet 3     Sig: TAKE ONE TABLET BY MOUTH EVERY EVENING     Refused Prescriptions Disp Refills    losartan-hydroCHLOROthiazide (HYZAAR) 100-12.5 MG per tablet [Pharmacy Med Name: Diania Ruthie 100-12.5 TABS] 90 tablet 1     Sig: TAKE 1 TABLET BY MOUTH ONE TIME A DAY       Last Filled:      Patient Phone Number: [phone] (home) [phone] (work)    Last appt: 12/28/2022   Next appt: 6/30/2023

## 2023-01-18 ENCOUNTER — OFFICE VISIT (OUTPATIENT)
Dept: SLEEP MEDICINE | Age: 63
End: 2023-01-18
Payer: COMMERCIAL

## 2023-01-18 VITALS
TEMPERATURE: 97.7 F | WEIGHT: 153 LBS | RESPIRATION RATE: 18 BRPM | BODY MASS INDEX: 28.16 KG/M2 | OXYGEN SATURATION: 96 % | HEART RATE: 84 BPM | HEIGHT: 62 IN | DIASTOLIC BLOOD PRESSURE: 87 MMHG | SYSTOLIC BLOOD PRESSURE: 135 MMHG

## 2023-01-18 DIAGNOSIS — G47.30 HYPERSOMNIA WITH SLEEP APNEA: ICD-10-CM

## 2023-01-18 DIAGNOSIS — G47.10 HYPERSOMNIA WITH SLEEP APNEA: ICD-10-CM

## 2023-01-18 DIAGNOSIS — Z99.89 OSA ON CPAP: Primary | ICD-10-CM

## 2023-01-18 DIAGNOSIS — G47.33 OSA ON CPAP: Primary | ICD-10-CM

## 2023-01-18 DIAGNOSIS — Z99.89 DEPENDENCE ON OTHER ENABLING MACHINES AND DEVICES: ICD-10-CM

## 2023-01-18 PROCEDURE — 3077F SYST BP >= 140 MM HG: CPT | Performed by: PSYCHIATRY & NEUROLOGY

## 2023-01-18 PROCEDURE — 99214 OFFICE O/P EST MOD 30 MIN: CPT | Performed by: PSYCHIATRY & NEUROLOGY

## 2023-01-18 PROCEDURE — 3079F DIAST BP 80-89 MM HG: CPT | Performed by: PSYCHIATRY & NEUROLOGY

## 2023-01-18 RX ORDER — ARMODAFINIL 150 MG/1
150 TABLET ORAL DAILY
Qty: 30 TABLET | Refills: 5 | Status: SHIPPED | OUTPATIENT
Start: 2023-01-18 | End: 2027-01-14

## 2023-01-18 ASSESSMENT — SLEEP AND FATIGUE QUESTIONNAIRES
ESS TOTAL SCORE: 9
HOW LIKELY ARE YOU TO NOD OFF OR FALL ASLEEP WHEN YOU ARE A PASSENGER IN A CAR FOR AN HOUR WITHOUT A BREAK: 1
HOW LIKELY ARE YOU TO NOD OFF OR FALL ASLEEP WHILE SITTING INACTIVE IN A PUBLIC PLACE: 1
HOW LIKELY ARE YOU TO NOD OFF OR FALL ASLEEP WHILE LYING DOWN TO REST IN THE AFTERNOON WHEN CIRCUMSTANCES PERMIT: 3
HOW LIKELY ARE YOU TO NOD OFF OR FALL ASLEEP IN A CAR, WHILE STOPPED FOR A FEW MINUTES IN TRAFFIC: 1
HOW LIKELY ARE YOU TO NOD OFF OR FALL ASLEEP WHILE SITTING AND TALKING TO SOMEONE: 0
HOW LIKELY ARE YOU TO NOD OFF OR FALL ASLEEP WHILE SITTING AND READING: 3
HOW LIKELY ARE YOU TO NOD OFF OR FALL ASLEEP WHILE WATCHING TV: 0
HOW LIKELY ARE YOU TO NOD OFF OR FALL ASLEEP WHILE SITTING QUIETLY AFTER LUNCH WITHOUT ALCOHOL: 0

## 2023-01-18 NOTE — PROGRESS NOTES
MD JUAN DIEGO Wing Board Certified in Sleep Medicine  Certified in [address] [place] [address] Certified in Neurology [address] [place], [place] [address]KIMBERLY 67  [phone]   [address] [place] [address]  59 [facility], [address] [place] [address] [facility]                      [address] [place] [address]  [address] [phone]    Subjective:     Patient ID: Eren Morales is a 60 y.o. female. Chief Complaint   Patient presents with    Follow-up    Sleep Apnea         HPI:        Eren Morales is a 60 y.o. female was seen today as a follow for severe obstructive sleep apnea and EDS with apnea with apnea hypopnea index of 33/h with lowest O2 saturation of 79%. Uses the Nuvigil 150 mg as needed only. Needs 9 hours of sleep and no insomnia. Patient is using the PAP machine about 100% of the time, more than 4 hours a nightabout  99 %, in total average of 8:57 hours a night in last 90 days. Currently on PAP at 12.5 cm (10-15), the AHI is only 1.7 events per hour at this pressure. Patient improved regarding daytime sleepiness and fatigue, wakes up refreshed in the morning. The Patient scored Beaver Sleepiness Score: 9 on Beaver Sleepiness Scale ( more than 10 is indicative of daytime sleepiness)   Patient has no problem with PAP pressure or mask. Uses nasal mask.    Dreamwear    DOT/CDL - N/A        Previous Report(s)Reviewed: historical medical records         Social History     Socioeconomic History    Marital status:      Spouse name: Not on file    Number of children: Not on file    Years of education: Not on file    Highest education level: Not on file   Occupational History    Not on file   Tobacco Use    Smoking status: Never     Passive exposure: Never    Smokeless tobacco: Never   Vaping Use    Vaping Use: Never used   Substance and Sexual Activity    Alcohol use: Yes     Comment: monthly, occasional drink    Drug use: No    Sexual activity: Yes     Partners: Male     Comment: hysterectomy   Other Topics Concern    Not on file   Social History Narrative    Not on file     Social Determinants of Health     Financial Resource Strain: High Risk    Difficulty of Paying Living Expenses: Very hard   Food Insecurity: No Food Insecurity    Worried About Running Out of Food in the Last Year: Never true    Ran Out of Food in the Last Year: Never true   Transportation Needs: Not on file   Physical Activity: Not on file   Stress: Not on file   Social Connections: Not on file   Intimate Partner Violence: Not on file   Housing Stability: Not on file       Prior to Admission medications    Medication Sig Start Date End Date Taking? Authorizing Provider   Armodafinil (NUVIGIL) 150 MG TABS tablet Take 1 tablet by mouth daily. Max Daily Amount: 150 mg 1/18/23 1/14/27 Yes Irma Spears MD   amitriptyline (ELAVIL) 25 MG tablet TAKE ONE TABLET BY MOUTH EVERY EVENING 1/16/23  Yes Chuy Soto MD   losartan-hydroCHLOROthiazide (HYZAAR) 100-12.5 MG per tablet TAKE 1 TABLET BY MOUTH ONE TIME A DAY 1/9/23  Yes Chuy Soto MD   buPROPion (WELLBUTRIN XL) 150 MG extended release tablet TAKE ONE TABLET BY MOUTH EVERY MORNING 12/20/22  Yes Chuy Soto MD   atorvastatin (LIPITOR) 80 MG tablet TAKE 1 TABLET BY MOUTH ONE TIME A DAY 6/22/22  Yes Chuy Soto MD   cyclobenzaprine (FLEXERIL) 5 MG tablet Take 1 tablet by mouth nightly as needed for Muscle spasms 12/23/21  Yes MINERVA Anaya - CNP   fluticasone (FLONASE) 50 MCG/ACT nasal spray 1 spray by Nasal route daily as needed  3/23/16  Yes Historical Provider, MD   metoprolol tartrate (LOPRESSOR) 25 MG tablet TAKE 1 TABLET BY MOUTH 2 TIMES A DAY 5/8/22   Chuy Soto MD       Allergies as of 01/18/2023 - Fully Reviewed 01/18/2023   Allergen Reaction Noted    Vicodin  [hydrocodone-acetaminophen] Shortness Of Breath 2013    Lisinopril  2020    Sansert [methysergide]  2018    Toradol [ketorolac tromethamine] Nausea Only 2020       Patient Active Problem List   Diagnosis    CMC DJD(carpometacarpal degenerative joint disease), localized primary    Fibromyalgia    Essential hypertension    Mixed hyperlipidemia    Dysthymia    Metatarsalgia of right foot    Status post right knee replacement    Left shoulder pain    Rotator cuff tendonitis, left    DAVID (obstructive sleep apnea)    Arthritis of right knee    DDD (degenerative disc disease), lumbar    Lumbar radicular pain    Arthritis of right hip    S/P lumbar fusion    Foraminal stenosis of lumbar region       Past Medical History:   Diagnosis Date    HBP (high blood pressure)     Hyperlipidemia     Obstructive sleep apnea on CPAP     Plantar fasciitis     Postoperative nausea     per patient lasted 3 weeks after knee replacement surgery    Seasonal allergies        Past Surgical History:   Procedure Laterality Date    CARPAL TUNNEL RELEASE Right      SECTION  0357,2370, ,      SECTION      x4    COLONOSCOPY  2018    Dr. Allan Victor with polyp    FOOT NEUROMA SURGERY Right     HAND SURGERY Left     Thumb joint    HYSTERECTOMY (CERVIX STATUS UNKNOWN)      JOINT REPLACEMENT Bilateral     knees    LASIK      NERVE BLOCK Right 10/22/2021    RIGHT SCIATIC NERVE BLOCK performed by Etta Bravo MD at [address] Right 2022    RIGHT SCIATIC NERVE BLOCK performed by Etta Bravo MD at [address] Right 2022    RIGHT SCIATIC NERVE BLOCK performed by Etta Bravo MD at [address] Left 2022    LEFT SCIATIC NERVE BLOCK performed by Etta Bravo MD at [address] Right 2021    LUMBAR EPIDURAL STEROID INJECTION - RIGHT L5-S1 performed by Gilson Romero Fam Mckeon MD at [address] Right 3/25/2022    LUMBAR EPIDURAL STEROID INJECTION - RIGHT L4-5 performed by Tan Cox MD at [address] Right 8/5/2022    LUMBAR EPIDURAL STEROID INJECTION RIGHT L4-5 performed by Tan Cox MD at [address] Right 11/11/2022    LUMBAR EPIDURAL STEROID INJECTION RIGHT L5-S1 performed by Tan Cox MD at [address]      left    SPINAL FUSION      L4    TOTAL KNEE ARTHROPLASTY Left 5/26/2020    ROBOTIC ASSISTED LEFT TOTAL KNEE REPLACEMENT performed by Pretty Dunn MD at [address] [facility] Right 12/2/2020    RIGHT TOTAL KNEE REPLACEMENT ROBOTIC ASSISTED performed by Pretty Dunn MD at [address] [facility] History   Problem Relation Age of Onset    Arthritis Other     Asthma Other     Cancer Other     Diabetes Other     High Blood Pressure Other     Breast Cancer Mother     Other Mother        Review of Systems    Objective:     Vitals:  Weight BMI Neck circumference    Wt Readings from Last 3 Encounters:   01/18/23 153 lb (69.4 kg)   12/28/22 153 lb (69.4 kg)   12/16/22 150 lb (68 kg)    Body mass index is 27.98 kg/m². BP HR SaO2   BP Readings from Last 3 Encounters:   01/18/23 (!) 153/89   12/28/22 132/74   12/16/22 (!) 131/96    Pulse Readings from Last 3 Encounters:   01/18/23 84   12/28/22 59   12/16/22 71    SpO2 Readings from Last 3 Encounters:   01/18/23 96%   12/28/22 98%   12/16/22 96%        Themandibular molar Class :   []1 []2 []3      Mallampati I Airway Classification:   []1 []2 []3 []4      Physical Exam    :   Severe Obstructive Sleep Apnea/Hypopnea Syndrome under good control on PAP at 12.5 cmwp. Diagnosis Orders   1. DAVID on CPAP        2. Dependence on other enabling machines and devices        3.  Hypersomnia with sleep apnea  Armodafinil (NUVIGIL) 150 MG TABS tablet        Plan:   Drug contract for Nuvigil. Will continue the PAP at 10-15 cmwp. I will order PAP supplies, mask, filters. ... OARRS report reviewed and is consistent with the plan of care. The patient understands the risks of dependency and addiction with above medication. Plan to take lowest therapeutic dose possible and discontinue as soon as possible. Yearly OARRS report, controlled substance agreement and urine tox screen. No orders of the defined types were placed in this encounter. Return in about 1 year (around 1/18/2024) for Reveiwing CPAP usage and compliance report and tro.     Sheryl Montemayor MD  Medical Director - [facility]

## 2023-01-18 NOTE — PROGRESS NOTES
Jo Ann Hayes         : 1960        PHONE: [phone] (home) [phone] (work)  [x] [facility]     [] [employer]      [] [id number]     [] [employer]    []   [] [facility]   [] [address] [place] [address]  [] Retail [facility] [] Other:     Diagnosis: [x] DAVID (G47.33) [] CSA (G47.31) [] Apnea (G47.30)   Length of Need: [] 12 Months [x] 99 Months [] Other:    Machine (CASSANDRA!): [] Respironics Dream Station      Auto [] ResMed AirSense     Auto [] Other:     []  CPAP () [] Bilevel ()   Mode: [] Auto [] Spontaneous    Mode: [] Auto [] Spontaneous                            Comfort Settings:   - Ramp Pressure: 5 cmH2O                                        - Ramp time: 15 min                                     -  Flex/EPR - 3 full time                                    - For ResMed Bilevel (TiMax-4 sec   TiMin- 0.2 sec)     Humidifier: [] Heated ()        [x] Water chamber replacement ()/ 1 per 6 months        Mask:   [x] Nasal () /1 per 3 months [] Full Face () /1 per 3 months   [x] Patient choice -Size and fit mask [] Patient Choice - Size and fit mask   [x] Dispense: N30 I airfit [] Dispense:    [x] Headgear () / 1 per 3 months [] Headgear () / 1 per 3 months   [x] Replacement Nasal Cushion ()/2 per month [] Interface Replacement ()/1 per month   [x] Replacement Nasal Pillows ()/2 per month         Tubing: [x] Heated ()/1 per 3 months    [] Standard ()/1 per 3 months [] Other:           Filters: [x] Non-disposable ()/1 per 6 months     [x] Ultra-Fine, Disposable ()/2 per month        Miscellaneous: [x] Chin Strap ()/ 1 per 6 months [] O2 bleed-in:       LPM   [] Oximetry on CPAP/Bilevel []  Other:          Start Order Date: 23    MEDICAL JUSTIFICATION:  I, the undersigned, certify that the above prescribed supplies are medically necessary for this patients wellbeing.   In my opinion, the supplies are both reasonable and necessary in reference to accepted standards of medicalpractice in treatment of this patients condition.     Ute Swartz MD      NPI: [id number]       Order Signed Date: 01/18/23    Electronically signed by Ute Swartz MD on 1/18/2023 at 12:38 PM

## 2023-01-22 NOTE — PROGRESS NOTES
Chief Complaint   Patient presents with    Annual Exam     Family History   Problem Relation Age of Onset    Arthritis Other     Asthma Other     Cancer Other     Diabetes Other     High Blood Pressure Other     Breast Cancer Mother     Other Mother      Social History     Socioeconomic History    Marital status:      Spouse name: Not on file    Number of children: Not on file    Years of education: Not on file    Highest education level: Not on file   Occupational History    Not on file   Social Needs    Financial resource strain: Not on file    Food insecurity     Worry: Not on file     Inability: Not on file    Transportation needs     Medical: Not on file     Non-medical: Not on file   Tobacco Use    Smoking status: Never Smoker    Smokeless tobacco: Never Used   Substance and Sexual Activity    Alcohol use: Yes     Comment: monthly, occasional drink    Drug use: No    Sexual activity: Yes     Partners: Male     Comment: hysterectomy   Lifestyle    Physical activity     Days per week: Not on file     Minutes per session: Not on file    Stress: Not on file   Relationships    Social connections     Talks on phone: Not on file     Gets together: Not on file     Attends Rastafari service: Not on file     Active member of club or organization: Not on file     Attends meetings of clubs or organizations: Not on file     Relationship status: Not on file    Intimate partner violence     Fear of current or ex partner: Not on file     Emotionally abused: Not on file     Physically abused: Not on file     Forced sexual activity: Not on file   Other Topics Concern    Not on file   Social History Narrative    Not on file       Current Outpatient Medications:     buPROPion (WELLBUTRIN XL) 150 MG extended release tablet, Take 1 tablet by mouth every morning, Disp: 90 tablet, Rfl: 1    cyclobenzaprine (FLEXERIL) 5 MG tablet, Take 1 tablet by mouth every evening, Disp: 90 tablet, Rfl: 3   amitriptyline (ELAVIL) 25 MG tablet, TAKE ONE TABLET BY MOUTH NIGHTLY, Disp: 90 tablet, Rfl: 3    metoprolol tartrate (LOPRESSOR) 25 MG tablet, Take 1 tablet by mouth 2 times daily, Disp: 180 tablet, Rfl: 3    losartan-hydroCHLOROthiazide (HYZAAR) 100-12.5 MG per tablet, TAKE 1 TABLET BY MOUTH ONCE DAILY, Disp: 90 tablet, Rfl: 3    atorvastatin (LIPITOR) 80 MG tablet, TAKE 1 TABLET BY MOUTH ONCE DAILY, Disp: 90 tablet, Rfl: 3    Armodafinil (NUVIGIL) 150 MG TABS tablet, one tab QAM prn, Disp: 30 tablet, Rfl: 5    fluticasone (FLONASE) 50 MCG/ACT nasal spray, 1 spray by Nasal route daily as needed , Disp: , Rfl:     doxepin (SINEQUAN) 10 MG capsule, Take 1 capsule by mouth nightly (Patient not taking: Reported on 1/25/2021), Disp: 30 capsule, Rfl: 5    cephALEXin (KEFLEX) 500 MG capsule, Take 1 capsule by mouth See Admin Instructions Take one capsule at 9pm today and one capsule at 9am tomorrow, Disp: 2 capsule, Rfl: 0    aspirin EC 81 MG EC tablet, Take 1 tablet by mouth 2 times daily for 14 days Please avoid missing doses. , Disp: 28 tablet, Rfl: 0    doxepin (SILENOR) 3 MG TABS tablet, Take 1 tablet by mouth nightly (Patient taking differently: Take 3 mg by mouth nightly as needed ), Disp: 90 tablet, Rfl: 3  Allergies   Allergen Reactions    Hydrocodone-Acetaminophen Shortness Of Breath     Other reaction(s): Respiratory problems, e.g., wheezing    Vicodin [Hydrocodone-Acetaminophen] Shortness Of Breath    Lisinopril      cough    Sansert [Methysergide]      Extreme weakness    Toradol [Ketorolac Tromethamine] Nausea Only       Patient Active Problem List   Diagnosis    Aia 16 DJD(carpometacarpal degenerative joint disease), localized primary    Fibromyalgia    Essential hypertension    Mixed hyperlipidemia    Dysthymia    Metatarsalgia of right foot    History of total knee arthroplasty, left    Left shoulder pain    Rotator cuff tendonitis, left    DAVID (obstructive sleep apnea)    Arthritis of right knee       HPI / ROS: Kimberly Brown presents for evaluation and management of :    # Depression - denies SI. OK on current med(s) per patient. She has been taking Nuvigil per Dr. Shola Pitts. Her typical pattern in morning tired  Achy by 5 pm feels normal and evenings are good. Goes to bed 9:30-10. Awakens at 3:40 for work. Works for [address] [place] [address] [employer]. Wt Readings from Last 3 Encounters:   01/25/21 137 lb (62.1 kg)   12/17/20 137 lb (62.1 kg)   12/02/20 137 lb 12.6 oz (62.5 kg)         A&o  /72   Pulse 72   Temp 96.8 °F (36 °C)   Ht 5' 2\" (1.575 m)   Wt 137 lb (62.1 kg)   LMP  (LMP Unknown)   SpO2 98%   BMI 25.06 kg/m²   Neck no bruit no TMg  Car reg no MGR  Lungs cta         Diagnosis Orders   1. Dysthymia      consider bupropipn and reveiwed nutrition changes bibliotherapy     No orders of the defined types were placed in this encounter. Unable to assess

## 2023-01-25 ENCOUNTER — APPOINTMENT (OUTPATIENT)
Dept: ULTRASOUND IMAGING | Age: 63
End: 2023-01-25
Payer: COMMERCIAL

## 2023-01-25 ENCOUNTER — HOSPITAL ENCOUNTER (OUTPATIENT)
Dept: WOMENS IMAGING | Age: 63
Discharge: HOME | End: 2023-01-25
Payer: COMMERCIAL

## 2023-01-25 DIAGNOSIS — R92.8 ABNORMAL MAMMOGRAM: ICD-10-CM

## 2023-01-25 PROCEDURE — 77065 DX MAMMO INCL CAD UNI: CPT

## 2023-01-25 PROCEDURE — G0279 TOMOSYNTHESIS, MAMMO: HCPCS

## 2023-03-03 RX ORDER — LOSARTAN POTASSIUM AND HYDROCHLOROTHIAZIDE 12.5; 1 MG/1; MG/1
TABLET ORAL
Qty: 90 TABLET | Refills: 1 | OUTPATIENT
Start: 2023-03-03

## 2023-03-06 RX ORDER — CYCLOBENZAPRINE HCL 5 MG
5 TABLET ORAL NIGHTLY PRN
Qty: 90 TABLET | Refills: 3 | Status: SHIPPED | OUTPATIENT
Start: 2023-03-06

## 2023-03-06 NOTE — TELEPHONE ENCOUNTER
Medication:   Requested Prescriptions     Pending Prescriptions Disp Refills    cyclobenzaprine (FLEXERIL) 5 MG tablet 90 tablet 3     Sig: Take 1 tablet by mouth nightly as needed for Muscle spasms        Last Filled:  12/23/2021    Patient Phone Number: [phone] (home) [phone] (work)    Last appt: 12/28/2022   Next appt: 6/30/2023    Last OARRS: No flowsheet data found.

## 2023-03-08 NOTE — PROGRESS NOTES
[facility]  PRE-PROCEDURE INSTRUCTIONS    Procedure date___03/10/2023______Arrival time__0845__________        Procedure time___0945_________       Screening questions for Pain procedures:  Do you have a current infection? _no________  Are you currently taking an antibiotic? _no_____  Are you taking a blood thinner?__no______    It is not necessary to stop eating or drinking prior to this procedure. We would like you to take your medications for blood pressure as usual.  You may be asked to stop blood thinners such as Coumadin, Plavix, Fragmin, Lovenox, etc., or any anti-inflammatories such as:  Aspirin, Ibuprofen, Advil, Naproxen prior to your procedure. We also ask that you stop any OTC medications that cause additional bleeding    You must make arrangements for a responsible adult to arrive with you and stay in our waiting area during your procedure. They will also need to take you home after your procedure. For your safety you will not be allowed to leave alone or drive yourself home. Also for your safety, it is strongly suggested that someone stay with you the first 24 hours after your procedure. For your comfort, please wear simple loose fitting clothing to the center. Please do not bring valuables. If you have a living will and a durable power of  for healthcare, please bring in a copy.     You will need to bring a photo ID and insurance card    Our goal is to provide you with excellent care so if you have any questions, please contact us at the [facility] at [phone]

## 2023-03-10 ENCOUNTER — APPOINTMENT (OUTPATIENT)
Dept: INTERVENTIONAL RADIOLOGY/VASCULAR | Age: 63
End: 2023-03-10
Attending: ANESTHESIOLOGY
Payer: COMMERCIAL

## 2023-03-10 ENCOUNTER — HOSPITAL ENCOUNTER (OUTPATIENT)
Age: 63
Setting detail: OUTPATIENT SURGERY
Discharge: HOME | End: 2023-03-10
Attending: ANESTHESIOLOGY | Admitting: ANESTHESIOLOGY
Payer: COMMERCIAL

## 2023-03-10 VITALS
HEART RATE: 66 BPM | HEIGHT: 62 IN | DIASTOLIC BLOOD PRESSURE: 80 MMHG | TEMPERATURE: 97 F | SYSTOLIC BLOOD PRESSURE: 127 MMHG | WEIGHT: 150 LBS | OXYGEN SATURATION: 100 % | BODY MASS INDEX: 27.6 KG/M2 | RESPIRATION RATE: 16 BRPM

## 2023-03-10 PROCEDURE — 2709999900 HC NON-CHARGEABLE SUPPLY: Performed by: ANESTHESIOLOGY

## 2023-03-10 PROCEDURE — 3610000054 HC PAIN LEVEL 3 BASE (NON-OR): Performed by: ANESTHESIOLOGY

## 2023-03-10 PROCEDURE — 6360000004 HC RX CONTRAST MEDICATION: Performed by: ANESTHESIOLOGY

## 2023-03-10 PROCEDURE — 6360000002 HC RX W HCPCS: Performed by: ANESTHESIOLOGY

## 2023-03-10 RX ORDER — METHYLPREDNISOLONE ACETATE 80 MG/ML
INJECTION, SUSPENSION INTRA-ARTICULAR; INTRALESIONAL; INTRAMUSCULAR; SOFT TISSUE
Status: COMPLETED | OUTPATIENT
Start: 2023-03-10 | End: 2023-03-10

## 2023-03-10 ASSESSMENT — PAIN DESCRIPTION - FREQUENCY
FREQUENCY: CONTINUOUS
FREQUENCY: CONTINUOUS

## 2023-03-10 ASSESSMENT — PAIN SCALES - GENERAL
PAINLEVEL_OUTOF10: 4
PAINLEVEL_OUTOF10: 4

## 2023-03-10 ASSESSMENT — PAIN DESCRIPTION - DESCRIPTORS
DESCRIPTORS: BURNING
DESCRIPTORS: SHARP
DESCRIPTORS: BURNING

## 2023-03-10 ASSESSMENT — PAIN DESCRIPTION - LOCATION
LOCATION: BACK
LOCATION: BACK

## 2023-03-10 ASSESSMENT — PAIN DESCRIPTION - PAIN TYPE
TYPE: CHRONIC PAIN
TYPE: CHRONIC PAIN

## 2023-03-10 ASSESSMENT — PAIN DESCRIPTION - ORIENTATION: ORIENTATION: LOWER

## 2023-03-10 NOTE — H&P
Patient:  Tressa Shrestha  YOB: 1960  Medical Record #:  [id number]   Place: [address] [facility]  Date:  3/10/2023   Physician:  Tony Rosa MD    History Obtained From: electronic medical record    HISTORY OF PRESENT ILLNESS    Past Medical History:        Diagnosis Date    HBP (high blood pressure)     Hyperlipidemia     Obstructive sleep apnea on CPAP     Plantar fasciitis     Postoperative nausea     per patient lasted 3 weeks after knee replacement surgery    Seasonal allergies      Past Surgical History:        Procedure Laterality Date    CARPAL TUNNEL RELEASE Right      SECTION  9159,4115, ,      SECTION      x4    COLONOSCOPY  2018    Dr. Riaz Rahman with polyp    FOOT NEUROMA SURGERY Right     HAND SURGERY Left     Thumb joint    HYSTERECTOMY (CERVIX STATUS UNKNOWN)      JOINT REPLACEMENT Bilateral     knees    LASIK      NERVE BLOCK Right 10/22/2021    RIGHT SCIATIC NERVE BLOCK performed by Olivia Fuller MD at [address] Right 2022    RIGHT SCIATIC NERVE BLOCK performed by Olivia Fuller MD at [address] Right 2022    RIGHT SCIATIC NERVE BLOCK performed by Olivia Fuller MD at [address] Left 2022    LEFT SCIATIC NERVE BLOCK performed by Olivia Fuller MD at [address] Right 2021    LUMBAR EPIDURAL STEROID INJECTION - RIGHT L5-S1 performed by Olivia Fuller MD at [address] Right 3/25/2022    LUMBAR EPIDURAL STEROID INJECTION - RIGHT L4-5 performed by Olivia Fuller MD at [address] Right 2022    LUMBAR EPIDURAL STEROID INJECTION RIGHT L4-5 performed by Olivia Fuller MD at [address] Right 2022    LUMBAR EPIDURAL STEROID INJECTION RIGHT L5-S1 performed by Tg Arce Simone Radford MD at [address]      left    SPINAL FUSION      L4    TOTAL KNEE ARTHROPLASTY Left 5/26/2020    ROBOTIC ASSISTED LEFT TOTAL KNEE REPLACEMENT performed by Dorothea Gonzalez MD at [facility] Right 12/2/2020    RIGHT TOTAL KNEE REPLACEMENT ROBOTIC ASSISTED performed by Dorothea Gonzalez MD at [place] [address]     Medications Prior to Admission:   No current facility-administered medications on file prior to encounter. Current Outpatient Medications on File Prior to Encounter   Medication Sig Dispense Refill    cyclobenzaprine (FLEXERIL) 5 MG tablet Take 1 tablet by mouth nightly as needed for Muscle spasms 90 tablet 3    Armodafinil (NUVIGIL) 150 MG TABS tablet Take 1 tablet by mouth daily. Max Daily Amount: 150 mg 30 tablet 5    amitriptyline (ELAVIL) 25 MG tablet TAKE ONE TABLET BY MOUTH EVERY EVENING 90 tablet 3    losartan-hydroCHLOROthiazide (HYZAAR) 100-12.5 MG per tablet TAKE 1 TABLET BY MOUTH ONE TIME A DAY 90 tablet 1    buPROPion (WELLBUTRIN XL) 150 MG extended release tablet TAKE ONE TABLET BY MOUTH EVERY MORNING 90 tablet 3    atorvastatin (LIPITOR) 80 MG tablet TAKE 1 TABLET BY MOUTH ONE TIME A DAY 90 tablet 3    metoprolol tartrate (LOPRESSOR) 25 MG tablet TAKE 1 TABLET BY MOUTH 2 TIMES A  tablet 3    fluticasone (FLONASE) 50 MCG/ACT nasal spray 1 spray by Nasal route daily as needed        Allergies:  Vicodin [hydrocodone-acetaminophen], Lisinopril, Sansert [methysergide], and Toradol [ketorolac tromethamine]  Social History     Socioeconomic History    Marital status:      Spouse name: Not on file    Number of children: Not on file    Years of education: Not on file    Highest education level: Not on file   Occupational History    Not on file   Tobacco Use    Smoking status: Never     Passive exposure: Never    Smokeless tobacco: Never   Vaping Use    Vaping Use: Never used   Substance and Sexual Activity    Alcohol use:  Yes     Comment: monthly, occasional drink    Drug use: No    Sexual activity: Yes     Partners: Male     Comment: hysterectomy   Other Topics Concern    Not on file   Social History Narrative    Not on file     Social Determinants of Health     Financial Resource Strain: High Risk    Difficulty of Paying Living Expenses: Very hard   Food Insecurity: No Food Insecurity    Worried About Running Out of Food in the Last Year: Never true    Ran Out of Food in the Last Year: Never true   Transportation Needs: Not on file   Physical Activity: Not on file   Stress: Not on file   Social Connections: Not on file   Intimate Partner Violence: Not on file   Housing Stability: Not on file     Family History   Problem Relation Age of Onset    Arthritis Other     Asthma Other     Cancer Other     Diabetes Other     High Blood Pressure Other     Breast Cancer Mother     Other Mother          PHYSICAL EXAM:      Ht 5' 2\" (1.575 m)   Wt 150 lb (68 kg)   LMP  (LMP Unknown)   BMI 27.44 kg/m²  I            ASSESSMENT AND PLAN:    1.  Procedure. options, risks and benefits reviewed with patient and expresses understanding.

## 2023-03-10 NOTE — DISCHARGE INSTRUCTIONS
[facility]   [address], [address]   [address]  [phone]      Patient:  Tyrell Rollins  YOB: 1960  Medical Record #:  [id number]   Date:  3/10/2023   Physician:  Indu Giraldo MD    Procedure Performed: @[facility]@     Discharge Instructions    Notify Pain Management Services if any of the following occur:    Redness/Swelling at the injection site lasting longer than 2 days  Fever (with redness, swelling, or drainage at the injection site)  Drainage at the injection site  New weakness/ numbness  Severe headache   Loss of bowel/ bladder function    General Instructions: You may experience numbness for several hours following your treatment. You should be cautious using those areas which are numb. Once the numbness wears off, you may apply ice or heat to injection site, if needed. Do not return to work or drive today    Rest today and return to normal activities tomorrow. On average, the steroid takes about 1 week to work and can be up to 2 weeks    You should continue to depend on your primary physician for your medical management of conditions not related to your pain management treatment. Continue to take all your other medications, including blood thinners, as directed by your primary physician unless otherwise instructed. NO changes have been made to your medications. Any changes listed on the discharge are based on patient self reporting. Any EMR warnings regarding drug/drug interactions were dismissed based on current use by the patient, management by prescribing physician and pharmacist and not managed by this physician. Any problem which relates specifically to a treatment or procedure performed today should be directed to the [facility].        Alan Montoya of Interventional Pain Management  [address] [facility], [address] [place] [address], [address]  [phone]

## 2023-03-10 NOTE — OP NOTE
Patient:  Franklin Meza  YOB: 1960  Medical Record #:  [id number]   Place: [facility]  Date:  3/10/2023   Physician:  Nell Medrano MD    PRE-PROCEDURE DIAGNOSIS: M54.16    POST-PROCEDURE DIAGNOSIS: M54.16    PROCEDURE:  Midline interlaminar right L5-S1 (initial attempt at L4-5) epidural steroid injection with fluoroscopy and epidurography. BRIEF HISTORY:  The patient presents today to [facility] for a scheduled lumbar epidural steroid injection procedure. The patient was re-evaluated today and is clinically unchanged as compared to my previous evaluation. The patient is clinically stable to proceed with the procedure. PROCEDURE NOTE:  The procedure was again explained to the patient and the previously distributed pre-procedure literature was reviewed. The options, rationale, and benefits of the procedure including pain relief, functional improvement, and increased mobility, as well as the risks of the procedure including but not limited to infection, bleeding, paresthesia, pain, failure to relieve pain, increased pain, headache, allergic reaction, neurologic impairment, local anesthetic, toxicity, and side effects and the potential side effects of corticosteroids were discussed with the patient and informed written consent was obtained from the patient. The patient was positioned in the prone position on the fluoroscopy table. The skin overlying the lumbosacral vertebrae was prepped using Chloraprep and draped in the usual sterile fashion. The L5-S1 lumbar intervertebral level was identified using intermittent AP fluoroscopy. The previously identified projection of overlying skin was anesthetized using 2 cc of buffered 1% lidocaine with a 27 gauge needle. A 3.5\" 22g Touhy needle was advanced through a small skin nick in the AP view towards the interlaminar and epidural space. The epidural space was easily identified using loss of resistance to saline. No difficulty, paresthesia or occurrence of pain was encountered. Careful aspiration was negative for CSF and blood. A total of 1 cc Isovue 300 was injected yielding an epidurogram.    FLUOROSCOPY:  A fluoroscopy unit was utilized to obtain fluoroscopic images for intra-procedural use and assistance. Fluoroscopy was utilized to identify anatomic and radiographic landmarks for the accompanying procedure guidance and not for diagnostic purposes. After negative aspiration 4 cc of therapeutic injectate containing 1 cc of Depo-Medrol 80 mg/cc and 3 ml of lidocaine 1% was injected slowly in aliquots while clinically observing and monitoring the patient with negative aspiration demonstrated between aliquots of injections. At this time no paresthesia or occurrence of pain was present. The needle was then removed, the area was cleansed and a Band-Aid was placed over the injection site. There were no complications. The patient tolerated the procedure well. The procedure was performed using local anesthesia. The patient was transferred by wheelchair with accompaniment to the  Recovery Area and was monitored per protocol. The vital signs remained stable. The patient was discharged in stable condition accompanied by an escort with written instructions after fulfilling the standard discharge criteria. Written follow up instructions were given to the patient. Estimated Blood Loss: 0ml    Plan:  Follow up in 6-8 weeks    An initial attempt was made at the planned level. However, due to anatomy the level was changed and the patient was informed of this change.       Office: [phone]

## 2023-03-14 ENCOUNTER — OFFICE VISIT (OUTPATIENT)
Dept: ORTHOPEDIC SURGERY | Age: 63
End: 2023-03-14

## 2023-03-14 VITALS — RESPIRATION RATE: 16 BRPM | WEIGHT: 148 LBS | HEIGHT: 62 IN | BODY MASS INDEX: 27.23 KG/M2

## 2023-03-14 DIAGNOSIS — M25.512 CHRONIC LEFT SHOULDER PAIN: ICD-10-CM

## 2023-03-14 DIAGNOSIS — G89.29 CHRONIC LEFT SHOULDER PAIN: ICD-10-CM

## 2023-03-14 DIAGNOSIS — M19.012 ARTHRITIS OF LEFT GLENOHUMERAL JOINT: Primary | ICD-10-CM

## 2023-03-14 RX ORDER — LIDOCAINE HYDROCHLORIDE 10 MG/ML
4 INJECTION, SOLUTION INFILTRATION; PERINEURAL ONCE
Status: COMPLETED | OUTPATIENT
Start: 2023-03-14 | End: 2023-03-14

## 2023-03-14 RX ORDER — BUPIVACAINE HYDROCHLORIDE 2.5 MG/ML
4 INJECTION, SOLUTION INFILTRATION; PERINEURAL ONCE
Status: COMPLETED | OUTPATIENT
Start: 2023-03-14 | End: 2023-03-14

## 2023-03-14 RX ORDER — TRIAMCINOLONE ACETONIDE 40 MG/ML
40 INJECTION, SUSPENSION INTRA-ARTICULAR; INTRAMUSCULAR ONCE
Status: COMPLETED | OUTPATIENT
Start: 2023-03-14 | End: 2023-03-14

## 2023-03-14 RX ADMIN — TRIAMCINOLONE ACETONIDE 40 MG: 40 INJECTION, SUSPENSION INTRA-ARTICULAR; INTRAMUSCULAR at 16:06

## 2023-03-14 RX ADMIN — LIDOCAINE HYDROCHLORIDE 4 ML: 10 INJECTION, SOLUTION INFILTRATION; PERINEURAL at 16:06

## 2023-03-14 RX ADMIN — BUPIVACAINE HYDROCHLORIDE 10 MG: 2.5 INJECTION, SOLUTION INFILTRATION; PERINEURAL at 16:06

## 2023-03-14 NOTE — PROGRESS NOTES
CHIEF COMPLAINT: Left shoulder pain    History:    Cam Antonio is a 60 y.o. right handed female self-referred for evaluation and treatment of Left shoulder pain. This is evaluated as a personal injury. The pain began years ago. Pain is rated as a 2-8/10. She did have left shoulder arthroscopy, subacromial decompression, coracoplasty, and debridement of her supraspinatus, subscapularis, and labrum by Dr. Wilian Malagon in 2016. She states that she did have some pain improvement after that, but her shoulder was never completely normal.  About 5 years ago, she started having more pain, that has progressively gotten worse. There was not an injury. She is able to really localize her pain. She states that pain is worse when she lays on her side. She does notice some discomfort with overhead activity, and reaching behind her back, and throwing. The patient has not had formal PT. She states that she was doing physical therapy for her knee and the therapist showed her some stuff to do with her shoulder as a courtesy. The patient has not had an injection. The patient has tried NSAIDs with relief. She is also use Tylenol the patient has not tried ice.  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 Yung Platt with polyp    FOOT NEUROMA SURGERY Right     HAND SURGERY Left     Thumb joint    HYSTERECTOMY (CERVIX STATUS UNKNOWN)      JOINT REPLACEMENT Bilateral     knees    LASIK      NERVE BLOCK Right 10/22/2021    RIGHT SCIATIC NERVE BLOCK performed by Manny Lane MD at [address] Right 4/8/2022    RIGHT SCIATIC NERVE BLOCK performed by Manny Lane MD at [address] Right 8/19/2022    RIGHT SCIATIC NERVE BLOCK performed by Manny Lane MD at [address] Left 12/16/2022    LEFT SCIATIC NERVE BLOCK performed by Manny Lane MD at [address] Right 9/24/2021    LUMBAR EPIDURAL STEROID INJECTION - RIGHT L5-S1 performed by Manny Lane MD at [address] Right 3/25/2022    LUMBAR EPIDURAL STEROID INJECTION - RIGHT L4-5 performed by Manny Lane MD at [address] Right 8/5/2022    LUMBAR EPIDURAL STEROID INJECTION RIGHT L4-5 performed by Manny Lane MD at [address] Right 11/11/2022    LUMBAR EPIDURAL STEROID INJECTION RIGHT L5-S1 performed by Manny Lane MD at [address] Right 3/10/2023    LUMBAR EPIDURAL STEROID INJECTION RIGHT L5-S1 performed by Manny Lane MD at [address]      left    SPINAL FUSION      L4    TOTAL KNEE ARTHROPLASTY Left 5/26/2020    ROBOTIC ASSISTED LEFT TOTAL KNEE REPLACEMENT performed by Sierra Lance MD at [facility] Right 12/2/2020    RIGHT TOTAL KNEE REPLACEMENT ROBOTIC ASSISTED performed by Sierra Lance MD at [place] [address]       Current Outpatient Medications on File Prior to Visit   Medication Sig Dispense Refill    cyclobenzaprine (FLEXERIL) 5 MG tablet Take 1 tablet by mouth nightly as needed for Muscle spasms 90 tablet 3    Armodafinil (NUVIGIL) 150 MG TABS tablet Take 1 tablet by mouth daily.  Max Daily Amount: 150 mg 30 tablet 5    amitriptyline (ELAVIL) 25 MG tablet TAKE ONE TABLET BY MOUTH EVERY EVENING 90 tablet 3    losartan-hydroCHLOROthiazide (HYZAAR) 100-12.5 MG per tablet TAKE 1 TABLET BY MOUTH ONE TIME A DAY 90 tablet 1    buPROPion (WELLBUTRIN XL) 150 MG extended release tablet TAKE ONE TABLET BY MOUTH EVERY MORNING 90 tablet 3    atorvastatin (LIPITOR) 80 MG tablet TAKE 1 TABLET BY MOUTH ONE TIME A DAY 90 tablet 3    metoprolol tartrate (LOPRESSOR) 25 MG tablet TAKE 1 TABLET BY MOUTH 2 TIMES A  tablet 3    fluticasone (FLONASE) 50 MCG/ACT nasal spray 1 spray by Nasal route daily as needed        No current facility-administered medications on file prior to visit.        Allergies   Allergen Reactions    Vicodin [Hydrocodone-Acetaminophen] Shortness Of Breath    Lisinopril      cough    Sansert [Methysergide]      Extreme weakness    Toradol [Ketorolac Tromethamine] Nausea Only       Social History     Socioeconomic History    Marital status:      Spouse name: Not on file    Number of children: Not on file    Years of education: Not on file    Highest education level: Not on file   Occupational History    Not on file   Tobacco Use    Smoking status: Never     Passive exposure: Never    Smokeless tobacco: Never   Vaping Use    Vaping Use: Never used   Substance and Sexual Activity    Alcohol use: Yes     Comment: monthly, occasional drink    Drug use: No    Sexual activity: Yes     Partners: Male     Comment: hysterectomy   Other Topics Concern    Not on file   Social History Narrative    Not on file     Social Determinants of Health     Financial Resource Strain: High Risk    Difficulty of Paying Living Expenses: Very hard   Food Insecurity: No Food Insecurity    Worried About Running Out of Food in the Last Year: Never true    Ran Out of Food in the Last Year: Never true   Transportation Needs: Not on file   Physical Activity: Not on file   Stress: Not on file   Social Connections: Not on file   Intimate Partner Violence: Not on file   Housing Stability: Not on file       Family History   Problem Relation Age of Onset    Arthritis Other Asthma Other     Cancer Other     Diabetes Other     High Blood Pressure Other     Breast Cancer Mother     Other Mother            Physical Examination:      Vital signs:  Resp 16   Ht 5' 2\" (1.575 m)   Wt 148 lb (67.1 kg)   LMP  (LMP Unknown)   BMI 27.07 kg/m²     General:   alert, appears stated age, cooperative, and no distress   Left Shoulder   Active ROM:   forward flexion 180, external rotation 80, internal rotation L4. Bilateral shoulders   Joint Tenderness:   Minimal tenderness globally   Neer:   negative   Nolen:   negative   Strength:   5/5 Supraspinatus, External rotation    Bilateral shoulders   Drop-arm test:   negative   Belly-press test:   negative   Bear-hug test:   negative   Speed's test:   Minimally positive   Bicipital groove tenderness:  Minimally postiive   Montano's test:   negative   Cross-body adduction test:   negative    AC joint tenderness:   Minimally positive     There are no skin lesions, cellulitis, or extreme edema in the upper extremities. Sensation is grossly intact to light touch bilaterally upper extremity. The patient has warm and well-perfused Bilateral upper extremities with brisk capillary refill. Imaging   Left Shoulder X-Ray: 3 views obtained and reviewed  AC Joint: widening of AC joint,   Glenohumeral joint: moderate narrowing. Humeral head osteophyte  Elevation humeral head: absent      Assessment:      Left shoulder glenohumeral arthritis  Hypertension   Sleep apnea      Plan:      Natural history and expected course discussed. Questions answered. Ice as needed. NSAIDs as needed. The risks and benefits of an injection were discussed with the patient. The patient had full opportunity to ask questions and all were answered. The patient then provided verbal informed consent. The skin was then prepped with betadine solution and alcohol.   Under aseptic conditions, the  left glenohumeral joint was injected with 4cc of 1% xylocaine, 4cc of 0.25% marcaine, and 1cc of Kenalog (40mg/ml). There were no immediate complications following the injection. The patient was advised of the possibility of injection site reaction and instructed to apply ice to the area and take NSAIDs if able. Follow-up in 3 months as needed. Magdi Duque. Dickson Tong MD  Orthopaedic Surgery and Sports Medicine     Disclaimer: This note was generated with use of a verbal recognition program and an attempt was made to check for errors. It is possible that there are still dictated errors within this office note. If so, please bring any significant errors to my attention for an addendum. All efforts were made to ensure that this office note is accurate.

## 2023-03-22 NOTE — PROGRESS NOTES
[facility]  PRE-PROCEDURE INSTRUCTIONS    Procedure date_03/24/2023________Arrival time__0845__________        Procedure time__0945__________       Screening questions for Pain procedures:  Do you have a current infection? __N_______  Are you currently taking an antibiotic?__N____  Are you taking a blood thinner?__N______    It is not necessary to stop eating or drinking prior to this procedure. We would like you to take your medications for blood pressure as usual.  You may be asked to stop blood thinners such as Coumadin, Plavix, Fragmin, Lovenox, etc., or any anti-inflammatories such as:  Aspirin, Ibuprofen, Advil, Naproxen prior to your procedure. We also ask that you stop any OTC medications that cause additional bleeding    You must make arrangements for a responsible adult to arrive with you and stay in our waiting area during your procedure. They will also need to take you home after your procedure. For your safety you will not be allowed to leave alone or drive yourself home. Also for your safety, it is strongly suggested that someone stay with you the first 24 hours after your procedure. For your comfort, please wear simple loose fitting clothing to the center. Please do not bring valuables. If you have a living will and a durable power of  for healthcare, please bring in a copy.     You will need to bring a photo ID and insurance card    Our goal is to provide you with excellent care so if you have any questions, please contact us at the [facility] at [phone]

## 2023-03-24 ENCOUNTER — APPOINTMENT (OUTPATIENT)
Dept: INTERVENTIONAL RADIOLOGY/VASCULAR | Age: 63
End: 2023-03-24
Attending: ANESTHESIOLOGY
Payer: COMMERCIAL

## 2023-03-24 ENCOUNTER — HOSPITAL ENCOUNTER (OUTPATIENT)
Age: 63
Setting detail: OUTPATIENT SURGERY
Discharge: HOME | End: 2023-03-24
Attending: ANESTHESIOLOGY | Admitting: ANESTHESIOLOGY
Payer: COMMERCIAL

## 2023-03-24 VITALS
HEIGHT: 62 IN | BODY MASS INDEX: 27.42 KG/M2 | DIASTOLIC BLOOD PRESSURE: 76 MMHG | OXYGEN SATURATION: 97 % | TEMPERATURE: 97.2 F | RESPIRATION RATE: 16 BRPM | WEIGHT: 149 LBS | SYSTOLIC BLOOD PRESSURE: 162 MMHG | HEART RATE: 77 BPM

## 2023-03-24 PROCEDURE — 2500000003 HC RX 250 WO HCPCS: Performed by: ANESTHESIOLOGY

## 2023-03-24 PROCEDURE — 6360000002 HC RX W HCPCS: Performed by: ANESTHESIOLOGY

## 2023-03-24 PROCEDURE — 3610000054 HC PAIN LEVEL 3 BASE (NON-OR): Performed by: ANESTHESIOLOGY

## 2023-03-24 PROCEDURE — 2709999900 HC NON-CHARGEABLE SUPPLY: Performed by: ANESTHESIOLOGY

## 2023-03-24 RX ORDER — LIDOCAINE HYDROCHLORIDE 10 MG/ML
INJECTION, SOLUTION EPIDURAL; INFILTRATION; INTRACAUDAL; PERINEURAL
Status: COMPLETED | OUTPATIENT
Start: 2023-03-24 | End: 2023-03-24

## 2023-03-24 RX ORDER — BUPIVACAINE HYDROCHLORIDE 5 MG/ML
INJECTION, SOLUTION EPIDURAL; INTRACAUDAL
Status: COMPLETED | OUTPATIENT
Start: 2023-03-24 | End: 2023-03-24

## 2023-03-24 RX ORDER — METHYLPREDNISOLONE ACETATE 80 MG/ML
INJECTION, SUSPENSION INTRA-ARTICULAR; INTRALESIONAL; INTRAMUSCULAR; SOFT TISSUE
Status: COMPLETED | OUTPATIENT
Start: 2023-03-24 | End: 2023-03-24

## 2023-03-24 ASSESSMENT — PAIN DESCRIPTION - DESCRIPTORS: DESCRIPTORS: ACHING;BURNING

## 2023-03-24 NOTE — H&P
Edin Negrete MD at [address] Right 3/10/2023    LUMBAR EPIDURAL STEROID INJECTION RIGHT L5-S1 performed by Geraldine Alejandre MD at [address]      left    SPINAL FUSION      L4    TOTAL KNEE ARTHROPLASTY Left 5/26/2020    ROBOTIC ASSISTED LEFT TOTAL KNEE REPLACEMENT performed by Nidia Prasad MD at [address] [facility] Right 12/2/2020    RIGHT TOTAL KNEE REPLACEMENT ROBOTIC ASSISTED performed by Nidia Prasad MD at [place] [address]     Medications Prior to Admission:   No current facility-administered medications on file prior to encounter. Current Outpatient Medications on File Prior to Encounter   Medication Sig Dispense Refill    cyclobenzaprine (FLEXERIL) 5 MG tablet Take 1 tablet by mouth nightly as needed for Muscle spasms 90 tablet 3    Armodafinil (NUVIGIL) 150 MG TABS tablet Take 1 tablet by mouth daily.  Max Daily Amount: 150 mg 30 tablet 5    amitriptyline (ELAVIL) 25 MG tablet TAKE ONE TABLET BY MOUTH EVERY EVENING 90 tablet 3    losartan-hydroCHLOROthiazide (HYZAAR) 100-12.5 MG per tablet TAKE 1 TABLET BY MOUTH ONE TIME A DAY 90 tablet 1    buPROPion (WELLBUTRIN XL) 150 MG extended release tablet TAKE ONE TABLET BY MOUTH EVERY MORNING 90 tablet 3    atorvastatin (LIPITOR) 80 MG tablet TAKE 1 TABLET BY MOUTH ONE TIME A DAY 90 tablet 3    metoprolol tartrate (LOPRESSOR) 25 MG tablet TAKE 1 TABLET BY MOUTH 2 TIMES A  tablet 3    fluticasone (FLONASE) 50 MCG/ACT nasal spray 1 spray by Nasal route daily as needed        Allergies:  Vicodin [hydrocodone-acetaminophen], Lisinopril, Sansert [methysergide], and Toradol [ketorolac tromethamine]  Social History     Socioeconomic History    Marital status:      Spouse name: Not on file    Number of children: Not on file    Years of education: Not on file    Highest education level: Not on file   Occupational History    Not on file   Tobacco Use    Smoking

## 2023-03-24 NOTE — DISCHARGE INSTRUCTIONS
[facility], [address] [place] [address], [address]  [phone]

## 2023-03-24 NOTE — OP NOTE
projection was anesthetized with buffered lidocaine 1% 2 cc using a 27-gauge needle. A spinal needle was then inserted slowly under direct intermittent AP fluoroscopy towards the ilium and the overlying piriformis muscle. Negative aspiration was re-demonstrated and therapeutic injectate consisting of 6 cc of lidocaine 1%, 1 cc of bupivacaine 0.5% and 1 cc of Depo-Medrol 40 mg/cc was injected without pain, paresthesia, difficulty, or complaints. The needle was removed, the area cleansed, a Band-Aid placed over the injection site. Patient tolerated the procedure well. There were no complications. The patient was transferred, by wheelchair or stretcher, with accompaniment to the recovery area where the vital signs remained stable. There was sensory or motor blockade in the lower extremity. This procedure was done using local anesthesia only. The patient was discharged in stable condition accompanied with an escort after fulfilling standard discharge criteria. FLUOROSCOPY:  A fluoroscopy unit was utilized to obtain fluoroscopic images for intra-procedural use and assistance. Fluoroscopy was utilized to identify anatomic and radiographic landmarks for the accompanying procedure guidance and not for diagnostic purposes.       Estimated Blood Loss: 0ml      Plan:  Follow up in 4-6 weeks      Office: [phone]

## 2023-03-30 RX ORDER — LOSARTAN POTASSIUM AND HYDROCHLOROTHIAZIDE 12.5; 1 MG/1; MG/1
TABLET ORAL
Qty: 90 TABLET | Refills: 1 | Status: SHIPPED | OUTPATIENT
Start: 2023-03-30

## 2023-03-30 NOTE — TELEPHONE ENCOUNTER
Medication:   Requested Prescriptions     Pending Prescriptions Disp Refills    losartan-hydroCHLOROthiazide (HYZAAR) 100-12.5 MG per tablet [Pharmacy Med Name: Venida Reges 100-12.5 TABS] 90 tablet 1     Sig: TAKE 1 TABLET BY MOUTH ONE TIME A DAY        Last Filled:  1/9/2023    Patient Phone Number: [phone] (home) [phone] (work)    Last appt: 12/28/2022   Next appt: 6/30/2023    Last OARRS: No flowsheet data found.

## 2023-03-31 ENCOUNTER — OFFICE VISIT (OUTPATIENT)
Dept: ORTHOPEDIC SURGERY | Age: 63
End: 2023-03-31

## 2023-03-31 VITALS — RESPIRATION RATE: 16 BRPM | BODY MASS INDEX: 27.42 KG/M2 | HEIGHT: 62 IN | WEIGHT: 149 LBS

## 2023-03-31 DIAGNOSIS — M79.644 PAIN OF RIGHT THUMB: Primary | ICD-10-CM

## 2023-03-31 DIAGNOSIS — M18.11 PRIMARY OSTEOARTHRITIS OF FIRST CARPOMETACARPAL JOINT OF RIGHT HAND: ICD-10-CM

## 2023-03-31 NOTE — Clinical Note
Dear  Rito Morton MD,  Thank you very much for your referral or Ms. Joshua Sutton to me for evaluation and treatment of her Hand & Wrist condition. I appreciate your confidence in me and thank you for allowing me the opportunity to care for your patients. If I can be of any further assistance to you on this or any other patient, please do not hesitate to contact me. Sincerely,  Coco Pineda.  Tg Lange MD

## 2023-03-31 NOTE — PROGRESS NOTES
protective finger splint was not indicated. Instructions were given regarding splint use and wear as well as suggestions for use of the other modalities were discussed. I have clearly explained to her that the above outlined treatment plan should not be expected to 'cure' her osteoarthritis, but we are rather treating the symptoms with which she presents. She has understood that in order to achieve more durable relief of her symptoms and to prevent future worsening or further damage, that definitive surgical treatment would be an option. Ms. Rebecca Beaver  voiced an appropriate understanding of our discussion, the options available to her, and of the expectations of her selected  treatment. I have also discussed with Ms. Rebecca Beaver  the other treatment options available to her  for this condition. We have today selected to proceed with conservative management. She and I have agreed that if our current course of conservative treatment does not prove to be effective over the short term future, that she will schedule a follow-up appointment to discuss and select an alternate course of therapy including possibly injection or surgical treatment. Ms. Rebecca Beaver has been given a full verbal list of instructions and precautions related to her present condition. I have asked her to followup with me in the office at the prescribed time. She is also specifically requested to call or return to the office sooner if her symptoms change or worsen prior to the next scheduled appointment.

## 2023-05-02 ENCOUNTER — TELEPHONE (OUTPATIENT)
Dept: ORTHOPEDIC SURGERY | Age: 63
End: 2023-05-02

## 2023-06-27 SDOH — ECONOMIC STABILITY: INCOME INSECURITY: HOW HARD IS IT FOR YOU TO PAY FOR THE VERY BASICS LIKE FOOD, HOUSING, MEDICAL CARE, AND HEATING?: NOT VERY HARD

## 2023-06-27 SDOH — ECONOMIC STABILITY: TRANSPORTATION INSECURITY
IN THE PAST 12 MONTHS, HAS LACK OF TRANSPORTATION KEPT YOU FROM MEETINGS, WORK, OR FROM GETTING THINGS NEEDED FOR DAILY LIVING?: NO

## 2023-06-27 SDOH — ECONOMIC STABILITY: HOUSING INSECURITY
IN THE LAST 12 MONTHS, WAS THERE A TIME WHEN YOU DID NOT HAVE A STEADY PLACE TO SLEEP OR SLEPT IN A SHELTER (INCLUDING NOW)?: NO

## 2023-06-27 SDOH — ECONOMIC STABILITY: FOOD INSECURITY: WITHIN THE PAST 12 MONTHS, YOU WORRIED THAT YOUR FOOD WOULD RUN OUT BEFORE YOU GOT MONEY TO BUY MORE.: NEVER TRUE

## 2023-06-27 SDOH — ECONOMIC STABILITY: FOOD INSECURITY: WITHIN THE PAST 12 MONTHS, THE FOOD YOU BOUGHT JUST DIDN'T LAST AND YOU DIDN'T HAVE MONEY TO GET MORE.: NEVER TRUE

## 2023-06-28 ASSESSMENT — PATIENT HEALTH QUESTIONNAIRE - PHQ9
7. TROUBLE CONCENTRATING ON THINGS, SUCH AS READING THE NEWSPAPER OR WATCHING TELEVISION: 3
6. FEELING BAD ABOUT YOURSELF - OR THAT YOU ARE A FAILURE OR HAVE LET YOURSELF OR YOUR FAMILY DOWN: 0
4. FEELING TIRED OR HAVING LITTLE ENERGY: 3
1. LITTLE INTEREST OR PLEASURE IN DOING THINGS: 1
10. IF YOU CHECKED OFF ANY PROBLEMS, HOW DIFFICULT HAVE THESE PROBLEMS MADE IT FOR YOU TO DO YOUR WORK, TAKE CARE OF THINGS AT HOME, OR GET ALONG WITH OTHER PEOPLE: 2
SUM OF ALL RESPONSES TO PHQ QUESTIONS 1-9: 8
9. THOUGHTS THAT YOU WOULD BE BETTER OFF DEAD, OR OF HURTING YOURSELF: NOT AT ALL
4. FEELING TIRED OR HAVING LITTLE ENERGY: NEARLY EVERY DAY
5. POOR APPETITE OR OVEREATING: NOT AT ALL
SUM OF ALL RESPONSES TO PHQ QUESTIONS 1-9: 8
5. POOR APPETITE OR OVEREATING: 0
SUM OF ALL RESPONSES TO PHQ QUESTIONS 1-9: 8
9. THOUGHTS THAT YOU WOULD BE BETTER OFF DEAD, OR OF HURTING YOURSELF: 0
8. MOVING OR SPEAKING SO SLOWLY THAT OTHER PEOPLE COULD HAVE NOTICED. OR THE OPPOSITE - BEING SO FIDGETY OR RESTLESS THAT YOU HAVE BEEN MOVING AROUND A LOT MORE THAN USUAL: NOT AT ALL
3. TROUBLE FALLING OR STAYING ASLEEP: NOT AT ALL
10. IF YOU CHECKED OFF ANY PROBLEMS, HOW DIFFICULT HAVE THESE PROBLEMS MADE IT FOR YOU TO DO YOUR WORK, TAKE CARE OF THINGS AT HOME, OR GET ALONG WITH OTHER PEOPLE: VERY DIFFICULT
SUM OF ALL RESPONSES TO PHQ QUESTIONS 1-9: 8
8. MOVING OR SPEAKING SO SLOWLY THAT OTHER PEOPLE COULD HAVE NOTICED. OR THE OPPOSITE, BEING SO FIGETY OR RESTLESS THAT YOU HAVE BEEN MOVING AROUND A LOT MORE THAN USUAL: 0
SUM OF ALL RESPONSES TO PHQ QUESTIONS 1-9: 8
2. FEELING DOWN, DEPRESSED OR HOPELESS: 1
6. FEELING BAD ABOUT YOURSELF - OR THAT YOU ARE A FAILURE OR HAVE LET YOURSELF OR YOUR FAMILY DOWN: NOT AT ALL
1. LITTLE INTEREST OR PLEASURE IN DOING THINGS: SEVERAL DAYS
7. TROUBLE CONCENTRATING ON THINGS, SUCH AS READING THE NEWSPAPER OR WATCHING TELEVISION: NEARLY EVERY DAY
SUM OF ALL RESPONSES TO PHQ9 QUESTIONS 1 & 2: 2
3. TROUBLE FALLING OR STAYING ASLEEP: 0
2. FEELING DOWN, DEPRESSED OR HOPELESS: SEVERAL DAYS

## 2023-06-30 ENCOUNTER — OFFICE VISIT (OUTPATIENT)
Dept: FAMILY MEDICINE CLINIC | Age: 63
End: 2023-06-30
Payer: COMMERCIAL

## 2023-06-30 ENCOUNTER — APPOINTMENT (OUTPATIENT)
Dept: INTERVENTIONAL RADIOLOGY/VASCULAR | Age: 63
End: 2023-06-30
Attending: ANESTHESIOLOGY
Payer: COMMERCIAL

## 2023-06-30 ENCOUNTER — HOSPITAL ENCOUNTER (OUTPATIENT)
Age: 63
Setting detail: OUTPATIENT SURGERY
Discharge: HOME | End: 2023-06-30
Attending: ANESTHESIOLOGY | Admitting: ANESTHESIOLOGY
Payer: COMMERCIAL

## 2023-06-30 VITALS
DIASTOLIC BLOOD PRESSURE: 85 MMHG | HEIGHT: 62 IN | RESPIRATION RATE: 16 BRPM | OXYGEN SATURATION: 98 % | TEMPERATURE: 97 F | HEART RATE: 58 BPM | BODY MASS INDEX: 27.79 KG/M2 | WEIGHT: 151 LBS | SYSTOLIC BLOOD PRESSURE: 148 MMHG

## 2023-06-30 VITALS
OXYGEN SATURATION: 98 % | BODY MASS INDEX: 27.62 KG/M2 | HEART RATE: 63 BPM | RESPIRATION RATE: 16 BRPM | WEIGHT: 151 LBS | DIASTOLIC BLOOD PRESSURE: 72 MMHG | SYSTOLIC BLOOD PRESSURE: 130 MMHG

## 2023-06-30 DIAGNOSIS — E78.2 MIXED HYPERLIPIDEMIA: ICD-10-CM

## 2023-06-30 DIAGNOSIS — Z12.11 COLON CANCER SCREENING: ICD-10-CM

## 2023-06-30 DIAGNOSIS — I10 ESSENTIAL HYPERTENSION: Primary | ICD-10-CM

## 2023-06-30 LAB
ALBUMIN SERPL-MCNC: 4.4 G/DL (ref 3.4–5)
ALBUMIN/GLOB SERPL: 2 {RATIO} (ref 1.1–2.2)
ALP SERPL-CCNC: 64 U/L (ref 40–129)
ALT SERPL-CCNC: 22 U/L (ref 10–40)
ANION GAP SERPL CALCULATED.3IONS-SCNC: 11 MMOL/L (ref 3–16)
AST SERPL-CCNC: 19 U/L (ref 15–37)
BILIRUB SERPL-MCNC: 0.6 MG/DL (ref 0–1)
BUN SERPL-MCNC: 21 MG/DL (ref 7–20)
CALCIUM SERPL-MCNC: 9.1 MG/DL (ref 8.3–10.6)
CHLORIDE SERPL-SCNC: 101 MMOL/L (ref 99–110)
CO2 SERPL-SCNC: 29 MMOL/L (ref 21–32)
CREAT SERPL-MCNC: 0.7 MG/DL (ref 0.6–1.2)
GFR SERPLBLD CREATININE-BSD FMLA CKD-EPI: >60 ML/MIN/{1.73_M2}
GLUCOSE SERPL-MCNC: 82 MG/DL (ref 70–99)
POTASSIUM SERPL-SCNC: 3.9 MMOL/L (ref 3.5–5.1)
PROT SERPL-MCNC: 6.6 G/DL (ref 6.4–8.2)
SODIUM SERPL-SCNC: 141 MMOL/L (ref 136–145)

## 2023-06-30 PROCEDURE — 6360000002 HC RX W HCPCS: Performed by: ANESTHESIOLOGY

## 2023-06-30 PROCEDURE — 6360000004 HC RX CONTRAST MEDICATION: Performed by: ANESTHESIOLOGY

## 2023-06-30 PROCEDURE — 2709999900 HC NON-CHARGEABLE SUPPLY: Performed by: ANESTHESIOLOGY

## 2023-06-30 PROCEDURE — 3075F SYST BP GE 130 - 139MM HG: CPT | Performed by: FAMILY MEDICINE

## 2023-06-30 PROCEDURE — 3610000054 HC PAIN LEVEL 3 BASE (NON-OR): Performed by: ANESTHESIOLOGY

## 2023-06-30 PROCEDURE — 3078F DIAST BP <80 MM HG: CPT | Performed by: FAMILY MEDICINE

## 2023-06-30 PROCEDURE — 99214 OFFICE O/P EST MOD 30 MIN: CPT | Performed by: FAMILY MEDICINE

## 2023-06-30 RX ORDER — METHYLPREDNISOLONE ACETATE 80 MG/ML
INJECTION, SUSPENSION INTRA-ARTICULAR; INTRALESIONAL; INTRAMUSCULAR; SOFT TISSUE
Status: COMPLETED | OUTPATIENT
Start: 2023-06-30 | End: 2023-06-30

## 2023-06-30 ASSESSMENT — PAIN SCALES - GENERAL
PAINLEVEL_OUTOF10: 0
PAINLEVEL_OUTOF10: 0

## 2023-07-13 NOTE — PROGRESS NOTES
[facility] ENDOSCOPY AND OUTPATIENT  PRE-PROCEDURE INSTRUCTIONS    Procedure date_07/14/2023________Arrival time__0700__________        Procedure time___0800_________       Screening questions for Pain procedures:  Do you have a current infection? ___N______  Are you currently taking an antibiotic?__N____  Are you taking a blood thinner?__N______    It is not necessary to stop eating or drinking prior to this procedure. We would like you to take your medications for blood pressure as usual.  You may be asked to stop blood thinners such as Coumadin, Plavix, Fragmin, Lovenox, etc., or any anti-inflammatories such as:  Aspirin, Ibuprofen, Advil, Naproxen prior to your procedure. We also ask that you stop any OTC medications that cause additional bleeding    You must make arrangements for a responsible adult to arrive with you and stay in our waiting area during your procedure. They will also need to take you home after your procedure. For your safety you will not be allowed to leave alone or drive yourself home. Also for your safety, it is strongly suggested that someone stay with you the first 24 hours after your procedure. For your comfort, please wear simple loose fitting clothing to the center. Please do not bring valuables. If you have a living will and a durable power of  for healthcare, please bring in a copy.     You will need to bring a photo ID and insurance card    Our goal is to provide you with excellent care so if you have any questions, please contact us at the [address] [facility] at [phone]

## 2023-07-14 ENCOUNTER — APPOINTMENT (OUTPATIENT)
Dept: INTERVENTIONAL RADIOLOGY/VASCULAR | Age: 63
End: 2023-07-14
Attending: ANESTHESIOLOGY
Payer: COMMERCIAL

## 2023-07-14 ENCOUNTER — HOSPITAL ENCOUNTER (OUTPATIENT)
Age: 63
Setting detail: OUTPATIENT SURGERY
Discharge: HOME | End: 2023-07-14
Attending: ANESTHESIOLOGY | Admitting: ANESTHESIOLOGY
Payer: COMMERCIAL

## 2023-07-14 VITALS
HEART RATE: 58 BPM | SYSTOLIC BLOOD PRESSURE: 149 MMHG | TEMPERATURE: 97 F | RESPIRATION RATE: 16 BRPM | BODY MASS INDEX: 27.79 KG/M2 | WEIGHT: 151 LBS | DIASTOLIC BLOOD PRESSURE: 99 MMHG | HEIGHT: 62 IN | OXYGEN SATURATION: 98 %

## 2023-07-14 PROCEDURE — 2500000003 HC RX 250 WO HCPCS: Performed by: ANESTHESIOLOGY

## 2023-07-14 PROCEDURE — 6360000002 HC RX W HCPCS: Performed by: ANESTHESIOLOGY

## 2023-07-14 PROCEDURE — 2709999900 HC NON-CHARGEABLE SUPPLY: Performed by: ANESTHESIOLOGY

## 2023-07-14 PROCEDURE — 3610000054 HC PAIN LEVEL 3 BASE (NON-OR): Performed by: ANESTHESIOLOGY

## 2023-07-14 RX ORDER — BUPIVACAINE HYDROCHLORIDE 5 MG/ML
INJECTION, SOLUTION EPIDURAL; INTRACAUDAL
Status: COMPLETED | OUTPATIENT
Start: 2023-07-14 | End: 2023-07-14

## 2023-07-14 RX ORDER — METHYLPREDNISOLONE ACETATE 80 MG/ML
INJECTION, SUSPENSION INTRA-ARTICULAR; INTRALESIONAL; INTRAMUSCULAR; SOFT TISSUE
Status: COMPLETED | OUTPATIENT
Start: 2023-07-14 | End: 2023-07-14

## 2023-07-14 RX ORDER — LIDOCAINE HYDROCHLORIDE 10 MG/ML
INJECTION, SOLUTION EPIDURAL; INFILTRATION; INTRACAUDAL; PERINEURAL
Status: COMPLETED | OUTPATIENT
Start: 2023-07-14 | End: 2023-07-14

## 2023-07-14 ASSESSMENT — PAIN SCALES - GENERAL: PAINLEVEL_OUTOF10: 0

## 2023-07-14 ASSESSMENT — PAIN DESCRIPTION - DESCRIPTORS: DESCRIPTORS: ACHING;DISCOMFORT

## 2023-07-14 NOTE — DISCHARGE INSTRUCTIONS
[address] [facility] [address], [address] [facility], [address]  [phone]

## 2023-07-14 NOTE — OP NOTE
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 BRIEF HISTORY:  The patient presents today to [facility] for a scheduled sciatic nerve block procedure. The patient was re-evaluated today and is clinically unchanged as compared to my previous evaluation. The patient is clinically stable to proceed with the procedure. PROCEDURE NOTE:  The procedure was again explained to the patient and the previously distributed pre-procedure literature was reviewed. The options, rationale, and benefits of the procedure including pain relief, functional improvement, and increased mobility, as well as the risks of the procedure including but not limited to infection, bleeding, paresthesia, pain, failure to relieve pain, increased pain, headache, allergic reaction, neurologic impairment, local anesthetic, toxicity, and side effects and the potential side effects of corticosteroids were discussed with the patient and informed written consent was obtained from the patient. The patient was brought to the fluoroscopy suite and placed in the prone position. The RIGHT sacral and gluteal area was prepped in the usual sterile fashion with Chloraprep and then draped. Intermittent AP fluoroscopy was utilized to localize the radiographic landmarks. A standard perisciatic nerve block approach was used. The sciatic notch, at its superolateral border was localized at its junction with the ilium rostral to the acetabulum and lateral to the SI joint and lateral to the sciatic nerve. It was localized at the radiographic marker of the overlying sciatic nerve between the lateral aspect of the sacrum and the greater trochanter.    The superficial skin

## 2023-07-17 LAB — NONINV COLON CA DNA+OCC BLD SCRN STL QL: NEGATIVE

## 2023-07-19 ENCOUNTER — HOSPITAL ENCOUNTER (OUTPATIENT)
Dept: WOMENS IMAGING | Age: 63
Discharge: HOME | End: 2023-07-19
Payer: COMMERCIAL

## 2023-07-19 DIAGNOSIS — Z12.31 BREAST CANCER SCREENING BY MAMMOGRAM: ICD-10-CM

## 2023-07-19 PROCEDURE — 77063 BREAST TOMOSYNTHESIS BI: CPT

## 2023-07-25 RX ORDER — ATORVASTATIN CALCIUM 80 MG/1
TABLET, FILM COATED ORAL
Qty: 90 TABLET | Refills: 3 | Status: SHIPPED | OUTPATIENT
Start: 2023-07-25

## 2023-10-19 ENCOUNTER — TELEPHONE (OUTPATIENT)
Dept: FAMILY MEDICINE CLINIC | Age: 63
End: 2023-10-19

## 2023-10-19 ENCOUNTER — TELEMEDICINE (OUTPATIENT)
Dept: FAMILY MEDICINE CLINIC | Age: 63
End: 2023-10-19
Payer: COMMERCIAL

## 2023-10-19 DIAGNOSIS — J20.9 ACUTE BRONCHITIS, UNSPECIFIED ORGANISM: Primary | ICD-10-CM

## 2023-10-19 PROCEDURE — 99422 OL DIG E/M SVC 11-20 MIN: CPT | Performed by: NURSE PRACTITIONER

## 2023-10-19 RX ORDER — CEFUROXIME AXETIL 500 MG/1
500 TABLET ORAL 2 TIMES DAILY
Qty: 20 TABLET | Refills: 0 | Status: SHIPPED | OUTPATIENT
Start: 2023-10-19 | End: 2023-10-29

## 2023-10-19 RX ORDER — DEXTROMETHORPHAN HYDROBROMIDE AND PROMETHAZINE HYDROCHLORIDE 15; 6.25 MG/5ML; MG/5ML
5 SYRUP ORAL 4 TIMES DAILY PRN
Qty: 120 ML | Refills: 0 | Status: SHIPPED | OUTPATIENT
Start: 2023-10-19 | End: 2023-10-26

## 2023-10-19 RX ORDER — PREDNISONE 20 MG/1
40 TABLET ORAL DAILY
Qty: 20 TABLET | Refills: 0 | Status: SHIPPED | OUTPATIENT
Start: 2023-10-19 | End: 2023-10-29

## 2023-10-19 RX ORDER — BENZONATATE 200 MG/1
200 CAPSULE ORAL 3 TIMES DAILY PRN
Qty: 30 CAPSULE | Refills: 0 | Status: SHIPPED | OUTPATIENT
Start: 2023-10-19 | End: 2023-10-29

## 2023-10-19 NOTE — PROGRESS NOTES
Blayne Jon, was evaluated through a synchronous (real-time) audio-video encounter. The patient (or guardian if applicable) is aware that this is a billable service, which includes applicable co-pays. This Virtual Visit was conducted with patient's (and/or legal guardian's) consent. Patient identification was verified, and a caregiver was present when appropriate. The patient was located at Home: [address] [place] [id number]  Provider was located at Home ([address] [place] [address]): [address] (:  1960) is a Established patient, presenting virtually for evaluation of the following: cough, congestion    Assessment & Plan   Below is the assessment and plan developed based on review of pertinent history, physical exam, labs, studies, and medications. 1. Acute bronchitis, unspecified organism  -     cefUROXime (CEFTIN) 500 MG tablet; Take 1 tablet by mouth 2 times daily for 10 days, Disp-20 tablet, R-0Normal  -     predniSONE (DELTASONE) 20 MG tablet; Take 2 tablets by mouth daily for 10 days, Disp-20 tablet, R-0Normal  -     promethazine-dextromethorphan (PROMETHAZINE-DM) 6.25-15 MG/5ML syrup; Take 5 mLs by mouth 4 times daily as needed for Cough, Disp-120 mL, R-0Normal  -     benzonatate (TESSALON) 200 MG capsule; Take 1 capsule by mouth 3 times daily as needed for Cough, Disp-30 capsule, R-0Normal    Return if symptoms worsen or fail to improve. Subjective   HPI    Patient presents today VV for croup symptoms. States gradnkids recently got over croup a month ago and within the last three weeks states cough, congestion and fatigue. Denies fevers, aches or  chills. Denies headaches, sob, chest pain. Tried otc medication with no relief. Denies covid testing.      Review of Systems   See HPI    Objective   Patient-Reported Vitals  No data recorded     Physical Exam  [INSTRUCTIONS:  \"[x]\" Indicates a positive item  \"[]\" Indicates a negative item  -- DELETE ALL ITEMS NOT

## 2023-10-19 NOTE — TELEPHONE ENCOUNTER
Pt called stating that her grand kids had croup and that she was feeling the affects of it. Pt said that they no longer have it but she said that she is having congestion. Pt said that she is leaving for vacation to visit an elderly relative and that she would like something called in so she is not contagious. To be called into [facility] pharmacy on [address] have.  Pt is reachable at [phone]

## 2023-10-31 ENCOUNTER — TELEPHONE (OUTPATIENT)
Dept: FAMILY MEDICINE CLINIC | Age: 63
End: 2023-10-31

## 2023-10-31 NOTE — TELEPHONE ENCOUNTER
Let patient know if the medication did not work, could be more viral in which the antibiotics may not work? I would have patient try the mucinex dm, flonase and zyrtec over the counter. for the respiratory issues and  Robitussin and delsym are great for the cough over the counter as well.  She would need to make an office visit if her symptoms do continue

## 2023-10-31 NOTE — TELEPHONE ENCOUNTER
Pt called stating that she was seen 10 days ago and prescribed antibiotics but she hasn't gotten any better. Pt said that she has been having the coughing and congestion for over a month now. Pt said that she would like to try a different antibiotic.  Pt is reachable at [phone]

## 2023-11-15 NOTE — PROGRESS NOTES
[facility]  PRE-PROCEDURE INSTRUCTIONS    Procedure date__11/17/2023_______Arrival time___0715_________        Procedure time___0815_________       Screening questions for Pain procedures:  Do you have a current infection? ___no______  Are you currently taking an antibiotic? _no_____  Are you taking a blood thinner?__no______    It is not necessary to stop eating or drinking prior to this procedure. We would like you to take your medications for blood pressure as usual.  You may be asked to stop blood thinners such as Coumadin, Plavix, Fragmin, Lovenox, etc., or any anti-inflammatories such as:  Aspirin, Ibuprofen, Advil, Naproxen prior to your procedure. We also ask that you stop any OTC medications that cause additional bleeding    You must make arrangements for a responsible adult to arrive with you and stay in our waiting area during your procedure. They will also need to take you home after your procedure. For your safety you will not be allowed to leave alone or drive yourself home. Also for your safety, it is strongly suggested that someone stay with you the first 24 hours after your procedure. For your comfort, please wear simple loose fitting clothing to the center. Please do not bring valuables. If you have a living will and a durable power of  for healthcare, please bring in a copy.     You will need to bring a photo ID and insurance card    Our goal is to provide you with excellent care so if you have any questions, please contact us at the [address] [facility] at [phone]

## 2023-11-17 ENCOUNTER — APPOINTMENT (OUTPATIENT)
Dept: INTERVENTIONAL RADIOLOGY/VASCULAR | Age: 63
End: 2023-11-17
Attending: ANESTHESIOLOGY
Payer: COMMERCIAL

## 2023-11-17 ENCOUNTER — HOSPITAL ENCOUNTER (OUTPATIENT)
Age: 63
Setting detail: OUTPATIENT SURGERY
Discharge: HOME | End: 2023-11-17
Attending: ANESTHESIOLOGY | Admitting: ANESTHESIOLOGY
Payer: COMMERCIAL

## 2023-11-17 VITALS
BODY MASS INDEX: 28.82 KG/M2 | TEMPERATURE: 96.8 F | WEIGHT: 156.6 LBS | OXYGEN SATURATION: 98 % | HEIGHT: 62 IN | DIASTOLIC BLOOD PRESSURE: 68 MMHG | HEART RATE: 69 BPM | RESPIRATION RATE: 16 BRPM | SYSTOLIC BLOOD PRESSURE: 138 MMHG

## 2023-11-17 PROCEDURE — 3610000054 HC PAIN LEVEL 3 BASE (NON-OR): Performed by: ANESTHESIOLOGY

## 2023-11-17 PROCEDURE — 2500000003 HC RX 250 WO HCPCS: Performed by: ANESTHESIOLOGY

## 2023-11-17 PROCEDURE — 6360000002 HC RX W HCPCS: Performed by: ANESTHESIOLOGY

## 2023-11-17 PROCEDURE — 2709999900 HC NON-CHARGEABLE SUPPLY: Performed by: ANESTHESIOLOGY

## 2023-11-17 RX ORDER — BUPIVACAINE HYDROCHLORIDE 5 MG/ML
INJECTION, SOLUTION EPIDURAL; INTRACAUDAL
Status: COMPLETED | OUTPATIENT
Start: 2023-11-17 | End: 2023-11-17

## 2023-11-17 RX ORDER — METHYLPREDNISOLONE ACETATE 80 MG/ML
INJECTION, SUSPENSION INTRA-ARTICULAR; INTRALESIONAL; INTRAMUSCULAR; SOFT TISSUE
Status: COMPLETED | OUTPATIENT
Start: 2023-11-17 | End: 2023-11-17

## 2023-11-17 RX ORDER — LIDOCAINE HYDROCHLORIDE 10 MG/ML
INJECTION, SOLUTION EPIDURAL; INFILTRATION; INTRACAUDAL; PERINEURAL
Status: COMPLETED | OUTPATIENT
Start: 2023-11-17 | End: 2023-11-17

## 2023-11-17 ASSESSMENT — PAIN DESCRIPTION - DESCRIPTORS
DESCRIPTORS: ACHING
DESCRIPTORS: OTHER (COMMENT)

## 2023-11-17 ASSESSMENT — PAIN - FUNCTIONAL ASSESSMENT: PAIN_FUNCTIONAL_ASSESSMENT: 0-10

## 2023-11-17 ASSESSMENT — PAIN DESCRIPTION - ORIENTATION: ORIENTATION: LOWER

## 2023-11-17 ASSESSMENT — PAIN DESCRIPTION - LOCATION: LOCATION: BACK

## 2023-11-17 ASSESSMENT — PAIN SCALES - GENERAL: PAINLEVEL_OUTOF10: 3

## 2023-11-17 NOTE — DISCHARGE INSTRUCTIONS
[address] [facility] [address], [address] [facility], [address] [place] [address],[address]  [phone]

## 2023-11-17 NOTE — OP NOTE
projection was anesthetized with buffered lidocaine 1% 2 cc using a 27-gauge needle. A spinal needle was then inserted slowly under direct intermittent AP fluoroscopy towards the ilium near the exiting sciatic nerve. Negative aspiration was re-demonstrated and therapeutic injectate consisting of 6 cc of lidocaine 1%, 1 cc of bupivacaine 0.5% and 1 cc of Depo-Medrol 40 mg/cc was injected without pain, paresthesia, difficulty, or complaints. The needle was removed, the area cleansed, a Band-Aid placed over the injection site. Patient tolerated the procedure well. There were no complications. The patient was transferred, by wheelchair or stretcher, with accompaniment to the recovery area where the vital signs remained stable. There was sensory or motor blockade in the lower extremity. This procedure was done using local anesthesia only. The patient was discharged in stable condition accompanied with an escort after fulfilling standard discharge criteria. FLUOROSCOPY:  A fluoroscopy unit was utilized to obtain fluoroscopic images for intra-procedural use and assistance. Fluoroscopy was utilized to identify anatomic and radiographic landmarks for the accompanying procedure guidance and not for diagnostic purposes.       Estimated Blood Loss: 0ml      Plan:  Follow up in 4-6 weeks      Office: [phone]

## 2023-11-17 NOTE — H&P
Patient:  Shane January  YOB: 1960  Medical Record #:  [id number]   Place: [address] [place] [address]  Date:  2023   Physician:  Suzie Smith MD    History Obtained From: electronic medical record    HISTORY OF PRESENT ILLNESS    Past Medical History:        Diagnosis Date    HBP (high blood pressure)     Hyperlipidemia     Obstructive sleep apnea on CPAP     Plantar fasciitis     Postoperative nausea     per patient lasted 3 weeks after knee replacement surgery    Seasonal allergies      Past Surgical History:        Procedure Laterality Date    CARPAL TUNNEL RELEASE Right      SECTION  2281,7127, ,      SECTION      x4    COLONOSCOPY  2018    Dr. Celine Choe with polyp    FOOT NEUROMA SURGERY Right     HAND SURGERY Left     Thumb joint    HYSTERECTOMY (CERVIX STATUS UNKNOWN)      JOINT REPLACEMENT Bilateral     knees    LASIK      NERVE BLOCK Right 10/22/2021    RIGHT SCIATIC NERVE BLOCK performed by Jonathan Arevalo MD at [facility] Right 2022    RIGHT SCIATIC NERVE BLOCK performed by Jonathan Arevalo MD at [facility] Right 2022    RIGHT SCIATIC NERVE BLOCK performed by Jonathan Arevalo MD at [facility] Left 2022    LEFT SCIATIC NERVE BLOCK performed by Jonathan Arevalo MD at [facility] Right 3/24/2023    RIGHT SCIATIC NERVE BLOCK performed by Jonathan Arevalo MD at [facility] Right 2023    RIGHT SCIATIC NERVE BLOCK performed by Jonathan Arevalo MD at [address] [place] [address] Dr Gonzalez Right 2021    LUMBAR EPIDURAL STEROID INJECTION - RIGHT L5-S1 performed by Jonathan Arevalo MD at [address] [place] [address] Dr Gonzalez Right 3/25/2022    LUMBAR EPIDURAL STEROID INJECTION - RIGHT L4-5 performed by Jonathan Arevalo MD at [address] [place] [address]

## 2023-11-21 ENCOUNTER — OFFICE VISIT (OUTPATIENT)
Dept: ORTHOPEDIC SURGERY | Age: 63
End: 2023-11-21
Payer: COMMERCIAL

## 2023-11-21 VITALS — BODY MASS INDEX: 28.89 KG/M2 | HEIGHT: 62 IN | RESPIRATION RATE: 16 BRPM | WEIGHT: 157 LBS

## 2023-11-21 DIAGNOSIS — M25.511 ACUTE PAIN OF RIGHT SHOULDER: Primary | ICD-10-CM

## 2023-11-21 PROCEDURE — 99213 OFFICE O/P EST LOW 20 MIN: CPT | Performed by: ORTHOPAEDIC SURGERY

## 2023-11-21 NOTE — PROGRESS NOTES
unremarkable. Discussed that we can monitor her symptoms. I did provide her with a handout with some rotator cuff strengthening exercises. Continue NSAIDs as needed. Try some ice as needed. We will monitor paresthesias. If these continue to worsen, discussed that she will give us a call and we will obtain EMGs of her bilateral upper extremities. We did discuss that there is a possibility that her symptoms may be coming from her neck and that is why her shoulder exam is mostly unremarkable. Americo Sewell. Yeimy Harper MD  Orthopaedic Surgery and Sports Medicine     Disclaimer: This note was generated with use of a verbal recognition program and an attempt was made to check for errors. It is possible that there are still dictated errors within this office note. If so, please bring any significant errors to my attention for an addendum. All efforts were made to ensure that this office note is accurate.

## 2023-12-27 ENCOUNTER — OFFICE VISIT (OUTPATIENT)
Dept: FAMILY MEDICINE CLINIC | Age: 63
End: 2023-12-27
Payer: COMMERCIAL

## 2023-12-27 VITALS
RESPIRATION RATE: 16 BRPM | SYSTOLIC BLOOD PRESSURE: 134 MMHG | HEART RATE: 82 BPM | OXYGEN SATURATION: 98 % | BODY MASS INDEX: 28.71 KG/M2 | HEIGHT: 62 IN | DIASTOLIC BLOOD PRESSURE: 86 MMHG | WEIGHT: 156 LBS

## 2023-12-27 DIAGNOSIS — I10 ESSENTIAL HYPERTENSION: ICD-10-CM

## 2023-12-27 DIAGNOSIS — Z00.00 ROUTINE GENERAL MEDICAL EXAMINATION AT A HEALTH CARE FACILITY: Primary | ICD-10-CM

## 2023-12-27 DIAGNOSIS — E78.2 MIXED HYPERLIPIDEMIA: ICD-10-CM

## 2023-12-27 DIAGNOSIS — Z12.31 ENCOUNTER FOR SCREENING MAMMOGRAM FOR MALIGNANT NEOPLASM OF BREAST: ICD-10-CM

## 2023-12-27 LAB
ALBUMIN SERPL-MCNC: 4.4 G/DL (ref 3.4–5)
ALBUMIN/GLOB SERPL: 1.8 {RATIO} (ref 1.1–2.2)
ALP SERPL-CCNC: 82 U/L (ref 40–129)
ALT SERPL-CCNC: 33 U/L (ref 10–40)
ANION GAP SERPL CALCULATED.3IONS-SCNC: 9 MMOL/L (ref 3–16)
AST SERPL-CCNC: 22 U/L (ref 15–37)
BILIRUB SERPL-MCNC: 0.4 MG/DL (ref 0–1)
BUN SERPL-MCNC: 15 MG/DL (ref 7–20)
CALCIUM SERPL-MCNC: 9.3 MG/DL (ref 8.3–10.6)
CHLORIDE SERPL-SCNC: 104 MMOL/L (ref 99–110)
CHOLEST SERPL-MCNC: 166 MG/DL (ref 0–199)
CO2 SERPL-SCNC: 31 MMOL/L (ref 21–32)
CREAT SERPL-MCNC: 0.7 MG/DL (ref 0.6–1.2)
GFR SERPLBLD CREATININE-BSD FMLA CKD-EPI: >60 ML/MIN/{1.73_M2}
GLUCOSE SERPL-MCNC: 103 MG/DL (ref 70–99)
HDLC SERPL-MCNC: 53 MG/DL (ref 40–60)
LDLC SERPL CALC-MCNC: 86 MG/DL
POTASSIUM SERPL-SCNC: 3.7 MMOL/L (ref 3.5–5.1)
PROT SERPL-MCNC: 6.8 G/DL (ref 6.4–8.2)
SODIUM SERPL-SCNC: 144 MMOL/L (ref 136–145)
TRIGL SERPL-MCNC: 135 MG/DL (ref 0–150)
VLDLC SERPL CALC-MCNC: 27 MG/DL

## 2023-12-27 PROCEDURE — 3075F SYST BP GE 130 - 139MM HG: CPT | Performed by: FAMILY MEDICINE

## 2023-12-27 PROCEDURE — 3079F DIAST BP 80-89 MM HG: CPT | Performed by: FAMILY MEDICINE

## 2023-12-27 PROCEDURE — 36415 COLL VENOUS BLD VENIPUNCTURE: CPT | Performed by: FAMILY MEDICINE

## 2023-12-27 PROCEDURE — 99396 PREV VISIT EST AGE 40-64: CPT | Performed by: FAMILY MEDICINE

## 2023-12-27 RX ORDER — AMITRIPTYLINE HYDROCHLORIDE 25 MG/1
25 TABLET, FILM COATED ORAL NIGHTLY
Qty: 90 TABLET | Refills: 3 | Status: SHIPPED | OUTPATIENT
Start: 2023-12-27

## 2023-12-27 RX ORDER — BUPROPION HYDROCHLORIDE 150 MG/1
TABLET ORAL
Qty: 90 TABLET | Refills: 3 | Status: SHIPPED | OUTPATIENT
Start: 2023-12-27

## 2023-12-27 RX ORDER — LOSARTAN POTASSIUM AND HYDROCHLOROTHIAZIDE 12.5; 1 MG/1; MG/1
1 TABLET ORAL DAILY
Qty: 90 TABLET | Refills: 3 | Status: SHIPPED | OUTPATIENT
Start: 2023-12-27

## 2023-12-28 ENCOUNTER — TELEPHONE (OUTPATIENT)
Dept: FAMILY MEDICINE CLINIC | Age: 63
End: 2023-12-28

## 2023-12-28 NOTE — TELEPHONE ENCOUNTER
Called and informed pt of below. Pt stated understanding and had no questions or concerns. ----- Message from MINERVA Hernandez CNP sent at 12/28/2023  1:26 PM EST -----  Please let patient know her labs are normal.     Please document call and then close encounter.   thanks

## 2024-01-01 NOTE — LETTER
1960     PHYSICIANS ORDERS  HEIGHT:   5'2           WEIGHT:   146      ALLERGIES:Hydrocodone-acetaminophen; Vicodin [hydrocodone-acetaminophen]; Lisinopril; Sansert [methysergide]; and Toradol [ketorolac tromethamine]          SURG    12-2                   JET     11-16               __________________________________________________________________  PRE-OP ORDERS:  ? CBC WITH DIFFERENTIAL                                                ? TYPE AND SCREEN                                                            ? HgB A1C                                                                               ? EKG                                                                                        ? NASAL CULUTRE MRSA  ? UAR/if positive repeat UAR on admission  ? BMP           ALBUMIN AND PREALBUMIN           VITAMIN D LEVELS  ? COAG PROFILE  ? SED RATE  ? PT/OT EVAL AND TEACHING  ? INSTRUCT PT TO STOP ALL NSAIDS, ASPIRIN, BLOOD THINNERS 7 DAYS PRIOR SURGERY  DAY OF SURGERY  ? CEFAZOLIN 2 GM IVPB; IF PATIENT WEIGHS > 80 KG AND SERUM CREATININE <2.5 mg/dl, GIVE 2 GM DOSE WITHIN 1 HOUR OF INCISION. ? IF THE PRE-OP NASAL CULTURE FOR MRSA WAS POSITIVE:   REPEAT NASAL SWAB ON ADMISSION AND ADMINISTER VANCOMYCIN 1 GM IVPB, REDUCE THE DOSE OF VANCOMYCIN  MG IVPB IF PT < 55 KG OR SERUM CREATININE > 2mg/dl; also to get Cefazolin 2 GM or wt based  ? All patients will receive preop Cefazolin 2 GM or wt based   ? APPLY KNEE HIGH ANTI-EMBOLIC AND PNEUMO-BOOTS TO UNOPERATIVE  LEG  ? CELEBREX  200 MG  ORALLY  DAY OF SURGERY  ?  ROXICODONE 10MG  ORALLY DAY OF SURGERY  OTHER ORDERS:_______________________________________________________PHYSICIAN SIGNATURE: __ 8/6/20                                                                                                       11:53 AM  ________________________DATE: Supplemental Confidentiality & Payment Agreement & Supplemental HIPAA Notice for Shared Medical Appointments        During shared medical appointments, you will hear about other participants health issues and personal information. As a matter of trust, it is your duty to keep everything you hear confidential.  Nothing that identifies a participant in any way (including job, ethnicity, Confucianism, etc.) can be shared outside of this group setting. I have read and agree to the following statements:        · I agree to meet with a group of patients and my doctor. I understand that I have the choice to be seen by my physician in this group or individually and that I may leave the group visit anytime I feel uncomfortable. · I understand that discussions will occur regarding individual identifiable health information during the shared medical appointment and I will maintain the confidentiality of [facility] health information or other information heard during the appointment. · I am committed to maintaining this confidentiality even if I am no longer participating in shared medical appointments. · I understand that the information I share with the physician and staff will be heard by the other participants and there is a possibility that the information disclosed in the shared medical appointment may be re-disclosed by other participants. I have been notified of this potential disclosure and I voluntarily agree to participate in the shared medical appointment. · I know that I dont have to share any personal information with the group or health care providers unless, I choose to do so. · Like any doctors appointment, I agree to be responsible for the bill and / or co-payment associated with this physician appointment.               Shared Medical Appointment THIS SUPPLEMENTAL NOTICE SUPPLEMENTS AND DOES NOT REPLACE THE [facility] CONSENT, PATIENT RESPONSIBILITY AND NOTICE OF PRIVACY PRACTICE. Marcus Posadas    1960        Patients Signature: ____________________________________________________         DATE: ____/____/___      Genita Revering / Support Persons Signature (if applicable) _________________________     DATE: __/__/__    **Each person will be asked to sign this commitment before each Shared Medical Appointment. Thank You ! SURGERY SCHEDULING TIMES                                                                                                                                                      Marcus Posadas                                                                                       1960                                                                           SURGERY DATE: ___/___/___                                                                      SURGERY TIME:  ___:___ AM/PM                                                                      ARRIVAL TIME:   ___:___  AM/PM                                                                                                                        **PLEASE REPORT TO THE                                                       INFORMATION DESK IN THE MAIN LOBBY                                                          OF THE HOSPITAL.         [address] [facility]  [facility]                           JET INFO     PT NAME:  Marcus Posadas              Patient Phone:  [phone] (home) [phone] (work)                                          1960    PCP:  PCP:  Richardson Hoffmann MD                APPT DATE:  ___/___/___      DATE:  20        H&P __________ baby girl, WT: 3705 LGA, HT: 52, HC: 35, Hep B given LABS: _________                      NEEDED:  __________    EKG:   _________                     NEEDED:  __________      JET DATE:   _______/_______      SURG DATE:   ________/________ baby girl, WT: 3705 LGA, Chem: 78,73,66, HT: 52, HC: 35, Hep B given.

## 2024-02-29 ENCOUNTER — TELEMEDICINE (OUTPATIENT)
Dept: FAMILY MEDICINE CLINIC | Age: 64
End: 2024-02-29

## 2024-02-29 DIAGNOSIS — R05.1 ACUTE COUGH: Primary | ICD-10-CM

## 2024-02-29 PROCEDURE — 99213 OFFICE O/P EST LOW 20 MIN: CPT | Performed by: FAMILY MEDICINE

## 2024-02-29 RX ORDER — PREDNISONE 5 MG/1
5 TABLET ORAL 2 TIMES DAILY
Qty: 10 TABLET | Refills: 0 | Status: SHIPPED | OUTPATIENT
Start: 2024-02-29 | End: 2024-03-05

## 2024-02-29 RX ORDER — AZITHROMYCIN 250 MG/1
TABLET, FILM COATED ORAL
Qty: 1 PACKET | Refills: 0 | Status: SHIPPED | OUTPATIENT
Start: 2024-02-29 | End: 2024-03-10

## 2024-02-29 NOTE — PROGRESS NOTES
2024    Karen Moss (:  1960) is a 63 y.o. female, here for evaluation of the following chief complaint(s):  No chief complaint on file.      ASSESSMENT/PLAN:     Diagnosis Orders   1. Acute cough      cover atypicals z-pack prednisone for eustachian tibes call INB          No follow-ups on file.    An electronic signature was used to authenticate this note.    SUBJECTIVE/OBJECTIVE:  (NOTE : prior results listed below reviewed at this visit to assist in medical decision making.)    HPI / ROS    # croupy (Grandkids had) and congestion no fever duration 4 dasy.        Wt Readings from Last 3 Encounters:   23 70.8 kg (156 lb)   23 72 kg (158 lb 12.8 oz)   23 71.2 kg (157 lb)       BP Readings from Last 3 Encounters:   23 134/86   23 115/70   23 138/68         TELEHEALTH EVALUATION -- Audio/Visual (During COVID-19 public health emergency)      Karen Moss (:  1960) is being evaluated by a Virtual Visit (video visit) encounter to address concerns as mentioned above.  A caregiver was present when appropriate. Due to this being a TeleHealth encounter (During COVID-19 public health emergency), evaluation of the following organ systems was limited: Vitals/Constitutional/EENT/Resp/CV/GI//MS/Neuro/Skin/Heme-Lymph-Imm.  Pursuant to the emergency declaration under the Bear Act and the National Emergencies Act, 1135 waiver authority and the Coronavirus Preparedness and Response Supplemental Appropriations Act, this Virtual Visit was conducted with patient's (and/or legal guardian's) consent, to reduce the patient's risk of exposure to COVID-19 and provide necessary medical care.  The patient (and/or legal guardian) has also been advised to contact this office for worsening conditions or problems, and seek emergency medical treatment and/or call 911 if deemed necessary.     Patient identification was verified at the start of the visit: Yes    Total time spent

## 2024-03-07 ENCOUNTER — OFFICE VISIT (OUTPATIENT)
Dept: FAMILY MEDICINE CLINIC | Age: 64
End: 2024-03-07

## 2024-03-07 VITALS
OXYGEN SATURATION: 96 % | TEMPERATURE: 97.7 F | HEART RATE: 74 BPM | BODY MASS INDEX: 28.89 KG/M2 | WEIGHT: 157 LBS | DIASTOLIC BLOOD PRESSURE: 80 MMHG | SYSTOLIC BLOOD PRESSURE: 122 MMHG | HEIGHT: 62 IN

## 2024-03-07 DIAGNOSIS — M51.36 DDD (DEGENERATIVE DISC DISEASE), LUMBAR: ICD-10-CM

## 2024-03-07 DIAGNOSIS — H65.112 ACUTE MUCOID OTITIS MEDIA OF LEFT EAR: Primary | ICD-10-CM

## 2024-03-07 PROCEDURE — 99213 OFFICE O/P EST LOW 20 MIN: CPT | Performed by: NURSE PRACTITIONER

## 2024-03-07 PROCEDURE — 3074F SYST BP LT 130 MM HG: CPT | Performed by: NURSE PRACTITIONER

## 2024-03-07 PROCEDURE — 3079F DIAST BP 80-89 MM HG: CPT | Performed by: NURSE PRACTITIONER

## 2024-03-07 RX ORDER — PREDNISONE 10 MG/1
10 TABLET ORAL 2 TIMES DAILY
Qty: 10 TABLET | Refills: 0 | Status: SHIPPED | OUTPATIENT
Start: 2024-03-07 | End: 2024-03-12

## 2024-03-07 RX ORDER — AMOXICILLIN AND CLAVULANATE POTASSIUM 875; 125 MG/1; MG/1
1 TABLET, FILM COATED ORAL 2 TIMES DAILY
Qty: 14 TABLET | Refills: 0 | Status: SHIPPED | OUTPATIENT
Start: 2024-03-07 | End: 2024-03-14

## 2024-03-07 RX ORDER — CYCLOBENZAPRINE HCL 5 MG
5 TABLET ORAL NIGHTLY PRN
Qty: 90 TABLET | Refills: 3 | Status: SHIPPED | OUTPATIENT
Start: 2024-03-07

## 2024-03-07 NOTE — PROGRESS NOTES
PROGRESS NOTE     Arielle Hyatt CNP  [facility]  [address] [place] [address], [address]  [facility] [address]  [phone] office  [phone] fax    Date of Service:  3/7/2024    Assessment / Plan:     1. Acute mucoid otitis media of left ear  Will treat with antibiotic and steroid. Negative epley maneuver. Likely dizziness is related to fluid behind TM. Ok to continue flonase and sudafed.     - amoxicillin-clavulanate (AUGMENTIN) 875-125 MG per tablet; Take 1 tablet by mouth 2 times daily for 7 days  Dispense: 14 tablet; Refill: 0  - predniSONE (DELTASONE) 10 MG tablet; Take 1 tablet by mouth 2 times daily for 5 days  Dispense: 10 tablet; Refill: 0    2. DDD (degenerative disc disease), lumbar  Requested refill.     - cyclobenzaprine (FLEXERIL) 5 MG tablet; Take 1 tablet by mouth nightly as needed for Muscle spasms  Dispense: 90 tablet; Refill: 3    Subjective:      Patient ID: .Karen Moss is a 63 y.o. female      CC: Not getting better    HPI  Patient was treated with Zpak and prednisone on 2/29 for cough and congestion.     She feels dizzy and has ringing in her ears. She feels like she cannot hear out of her left ear. Denies cough, congestion, sore throat and fever.     Already taking flonase daily.       Vitals:    03/07/24 1350   BP: 122/80   Site: Left Upper Arm   Position: Sitting   Cuff Size: Medium Adult   Pulse: 74   Temp: 97.7 °F (36.5 °C)   SpO2: 96%   Weight: 71.2 kg (157 lb)   Height: 1.575 m (5' 2\")       Outpatient Medications Marked as Taking for the 3/7/24 encounter (Office Visit) with Arielle Hyatt APRN - CNP   Medication Sig Dispense Refill    cyclobenzaprine (FLEXERIL) 5 MG tablet Take 1 tablet by mouth nightly as needed for Muscle spasms 90 tablet 3    amoxicillin-clavulanate (AUGMENTIN) 875-125 MG per tablet Take 1 tablet by mouth 2 times daily for 7 days 14 tablet 0    predniSONE (DELTASONE) 10 MG tablet Take 1 tablet by mouth 2 times daily for 5 days 10 tablet 0       Past

## 2024-04-10 LAB
CHOLEST SERPL-MCNC: 169 MG/DL (ref 0–199)
GLUCOSE SERPL-MCNC: 93 MG/DL (ref 70–99)
HDLC SERPL-MCNC: 58 MG/DL (ref 40–60)
LDLC SERPL CALC-MCNC: 85 MG/DL
TRIGL SERPL-MCNC: 131 MG/DL (ref 0–150)

## 2024-04-25 NOTE — PATIENT INSTRUCTIONS
Patient Education        Learning About CPAP for Sleep Apnea  What is CPAP? CPAP is a small machine that you use at home every night while you sleep. It increases air pressure in your throat to keep your airway open. When you have sleep apnea, this can help you sleep better, feel better, and avoid future health problems. CPAP stands for \"continuous positive airway pressure. \"  The CPAP machine will have one of the following:  · A mask that covers your nose and mouth  · A mask that covers your nose only  · A nasal pillow that covers only the openings of your nose  Why is it done? CPAP is usually the best treatment for obstructive sleep apnea. It is the first treatment choice and the most widely used. CPAP:  · Helps you have more normal sleep, so you feel less sleepy and more alert during the daytime. · May help keep heart failure or other heart problems from getting worse. · May help lower your blood pressure. If you have a bed partner, they may also sleep better when you use a CPAP. That's because you aren't snoring or restless. Your doctor may suggest CPAP if you have:  · Moderate to severe sleep apnea. · Sleep apnea and coronary artery disease (CAD). · Sleep apnea and heart failure. What are the side effects? Some people who use CPAP have:  · A dry or stuffy nose and a sore throat. · Irritated skin on the face. · Bloating. How can you care for yourself? If using CPAP is not comfortable, or if you have certain side effects, work with your doctor to fix them. Here are some things you can try:  · Be sure the mask, nasal mask, or nasal pillow fits well. · See if your doctor can adjust the pressure of your CPAP. · If your nose or mouth is dry, set the machine to deliver warmer or wetter air. Or try using a humidifier. · If your nose is runny or stuffy, talk to your doctor about using a decongestant medicine or steroid nasal spray. Be safe with medicines.  Read and follow all instructions on the label. Do not use the medicine longer than the label says. · Your doctor may also help you with problems like swallowing air, bloating, or claustrophobia. Talk to your doctor if you're still having problems. If these things don't help, you might try a different type of machine. Where can you learn more? Go to [email or web address] org and sign in to your [employer] account. Enter N349 in the [employer] box to learn more about \"Learning About CPAP for Sleep Apnea. \"     If you do not have an account, please click on the \"Sign Up Now\" link. Current as of: July 6, 2021               Content Version: 13.0  © 2006-2021 Healthwise, Incorporated. Care instructions adapted under license by [facility] ([facility]). If you have questions about a medical condition or this instruction, always ask your healthcare professional. [address] any warranty or liability for your use of this information. 5

## 2024-07-26 RX ORDER — ATORVASTATIN CALCIUM 80 MG/1
TABLET, FILM COATED ORAL
Qty: 90 TABLET | Refills: 3 | Status: SHIPPED | OUTPATIENT
Start: 2024-07-26

## 2024-07-26 NOTE — TELEPHONE ENCOUNTER
Medication:   Requested Prescriptions     Pending Prescriptions Disp Refills    atorvastatin (LIPITOR) 80 MG tablet [Pharmacy Med Name: ATORVASTATIN 80 MG TABLET] 90 tablet 3     Sig: TAKE 1 TABLET BY MOUTH DAILY        Last Filled:  04/24/24    Patient Phone Number: [phone] (home)     Last appt: 3/7/2024   Next appt: Visit date not found    Last OARRS:        No data to display

## 2024-07-29 NOTE — TELEPHONE ENCOUNTER
Medication:   Requested Prescriptions     Pending Prescriptions Disp Refills    metoprolol tartrate (LOPRESSOR) 25 MG tablet [Pharmacy Med Name: METOPROLOL TARTRATE 25 MG TAB] 180 tablet 3     Sig: TAKE 1 TABLET BY MOUTH 2 TIMES A DAY        Last Filled:  07/25/2023    Patient Phone Number: [phone] (home)     Last appt: 3/7/2024   Next appt: 12/31/2024    Last OARRS:        No data to display

## 2024-11-21 NOTE — TELEPHONE ENCOUNTER
Coming at 420pm
Started with vomiting on Sunday. Feels as if she has the flu. Has pain in her joints, congestion, cough, ribs hurt from coughing, chills, temp running about 101. Wanting to know if will call something in for her. Please advise. Please call call into [facility] [phone].  Please call Nataly Beaver back at [phone]
yes

## 2025-01-07 ENCOUNTER — HOSPITAL ENCOUNTER (OUTPATIENT)
Dept: WOMENS IMAGING | Age: 65
Discharge: HOME | End: 2025-01-07
Payer: MEDICARE

## 2025-01-07 VITALS — BODY MASS INDEX: 28.52 KG/M2 | WEIGHT: 155 LBS | HEIGHT: 62 IN

## 2025-01-07 DIAGNOSIS — Z12.31 VISIT FOR SCREENING MAMMOGRAM: ICD-10-CM

## 2025-01-07 PROCEDURE — 77063 BREAST TOMOSYNTHESIS BI: CPT

## 2025-01-07 ASSESSMENT — PATIENT HEALTH QUESTIONNAIRE - PHQ9
5. POOR APPETITE OR OVEREATING: NOT AT ALL
9. THOUGHTS THAT YOU WOULD BE BETTER OFF DEAD, OR OF HURTING YOURSELF: NOT AT ALL
SUM OF ALL RESPONSES TO PHQ QUESTIONS 1-9: 3
1. LITTLE INTEREST OR PLEASURE IN DOING THINGS: NOT AT ALL
5. POOR APPETITE OR OVEREATING: NOT AT ALL
SUM OF ALL RESPONSES TO PHQ QUESTIONS 1-9: 3
9. THOUGHTS THAT YOU WOULD BE BETTER OFF DEAD, OR OF HURTING YOURSELF: NOT AT ALL
10. IF YOU CHECKED OFF ANY PROBLEMS, HOW DIFFICULT HAVE THESE PROBLEMS MADE IT FOR YOU TO DO YOUR WORK, TAKE CARE OF THINGS AT HOME, OR GET ALONG WITH OTHER PEOPLE: SOMEWHAT DIFFICULT
8. MOVING OR SPEAKING SO SLOWLY THAT OTHER PEOPLE COULD HAVE NOTICED. OR THE OPPOSITE, BEING SO FIGETY OR RESTLESS THAT YOU HAVE BEEN MOVING AROUND A LOT MORE THAN USUAL: NOT AT ALL
SUM OF ALL RESPONSES TO PHQ QUESTIONS 1-9: 3
SUM OF ALL RESPONSES TO PHQ QUESTIONS 1-9: 3
SUM OF ALL RESPONSES TO PHQ9 QUESTIONS 1 & 2: 0
2. FEELING DOWN, DEPRESSED OR HOPELESS: NOT AT ALL
7. TROUBLE CONCENTRATING ON THINGS, SUCH AS READING THE NEWSPAPER OR WATCHING TELEVISION: SEVERAL DAYS
4. FEELING TIRED OR HAVING LITTLE ENERGY: SEVERAL DAYS
8. MOVING OR SPEAKING SO SLOWLY THAT OTHER PEOPLE COULD HAVE NOTICED. OR THE OPPOSITE - BEING SO FIDGETY OR RESTLESS THAT YOU HAVE BEEN MOVING AROUND A LOT MORE THAN USUAL: NOT AT ALL
3. TROUBLE FALLING OR STAYING ASLEEP: SEVERAL DAYS
SUM OF ALL RESPONSES TO PHQ QUESTIONS 1-9: 3
1. LITTLE INTEREST OR PLEASURE IN DOING THINGS: NOT AT ALL
2. FEELING DOWN, DEPRESSED OR HOPELESS: NOT AT ALL
4. FEELING TIRED OR HAVING LITTLE ENERGY: SEVERAL DAYS
6. FEELING BAD ABOUT YOURSELF - OR THAT YOU ARE A FAILURE OR HAVE LET YOURSELF OR YOUR FAMILY DOWN: NOT AT ALL
7. TROUBLE CONCENTRATING ON THINGS, SUCH AS READING THE NEWSPAPER OR WATCHING TELEVISION: SEVERAL DAYS
6. FEELING BAD ABOUT YOURSELF - OR THAT YOU ARE A FAILURE OR HAVE LET YOURSELF OR YOUR FAMILY DOWN: NOT AT ALL
3. TROUBLE FALLING OR STAYING ASLEEP: SEVERAL DAYS
10. IF YOU CHECKED OFF ANY PROBLEMS, HOW DIFFICULT HAVE THESE PROBLEMS MADE IT FOR YOU TO DO YOUR WORK, TAKE CARE OF THINGS AT HOME, OR GET ALONG WITH OTHER PEOPLE: SOMEWHAT DIFFICULT

## 2025-01-08 ENCOUNTER — OFFICE VISIT (OUTPATIENT)
Dept: FAMILY MEDICINE CLINIC | Age: 65
End: 2025-01-08
Payer: COMMERCIAL

## 2025-01-08 ENCOUNTER — OFFICE VISIT (OUTPATIENT)
Dept: SLEEP MEDICINE | Age: 65
End: 2025-01-08

## 2025-01-08 VITALS
RESPIRATION RATE: 18 BRPM | TEMPERATURE: 96.9 F | BODY MASS INDEX: 29.11 KG/M2 | SYSTOLIC BLOOD PRESSURE: 130 MMHG | DIASTOLIC BLOOD PRESSURE: 84 MMHG | HEIGHT: 62 IN | WEIGHT: 158.2 LBS | OXYGEN SATURATION: 97 % | HEART RATE: 72 BPM

## 2025-01-08 VITALS
SYSTOLIC BLOOD PRESSURE: 130 MMHG | HEIGHT: 62 IN | RESPIRATION RATE: 18 BRPM | DIASTOLIC BLOOD PRESSURE: 84 MMHG | HEART RATE: 72 BPM | WEIGHT: 158.2 LBS | BODY MASS INDEX: 29.11 KG/M2 | OXYGEN SATURATION: 97 %

## 2025-01-08 DIAGNOSIS — M51.360 DEGENERATION OF INTERVERTEBRAL DISC OF LUMBAR REGION WITH DISCOGENIC BACK PAIN: ICD-10-CM

## 2025-01-08 DIAGNOSIS — M79.7 FIBROMYALGIA: ICD-10-CM

## 2025-01-08 DIAGNOSIS — G47.10 HYPERSOMNIA WITH SLEEP APNEA: ICD-10-CM

## 2025-01-08 DIAGNOSIS — I10 ESSENTIAL HYPERTENSION: Primary | ICD-10-CM

## 2025-01-08 DIAGNOSIS — E78.2 MIXED HYPERLIPIDEMIA: ICD-10-CM

## 2025-01-08 DIAGNOSIS — I10 ESSENTIAL HYPERTENSION: ICD-10-CM

## 2025-01-08 DIAGNOSIS — G47.33 OSA ON CPAP: Primary | ICD-10-CM

## 2025-01-08 DIAGNOSIS — Z99.89 DEPENDENCE ON OTHER ENABLING MACHINES AND DEVICES: ICD-10-CM

## 2025-01-08 DIAGNOSIS — Z12.31 BREAST CANCER SCREENING BY MAMMOGRAM: ICD-10-CM

## 2025-01-08 DIAGNOSIS — G47.30 HYPERSOMNIA WITH SLEEP APNEA: ICD-10-CM

## 2025-01-08 LAB
ALBUMIN SERPL-MCNC: 4.7 G/DL (ref 3.4–5)
ALBUMIN/GLOB SERPL: 2.1 {RATIO} (ref 1.1–2.2)
ALP SERPL-CCNC: 74 U/L (ref 40–129)
ALT SERPL-CCNC: 38 U/L (ref 10–40)
ANION GAP SERPL CALCULATED.3IONS-SCNC: 11 MMOL/L (ref 3–16)
AST SERPL-CCNC: 22 U/L (ref 15–37)
BILIRUB SERPL-MCNC: 0.5 MG/DL (ref 0–1)
BUN SERPL-MCNC: 16 MG/DL (ref 7–20)
CALCIUM SERPL-MCNC: 9.5 MG/DL (ref 8.3–10.6)
CHLORIDE SERPL-SCNC: 98 MMOL/L (ref 99–110)
CO2 SERPL-SCNC: 30 MMOL/L (ref 21–32)
CREAT SERPL-MCNC: 0.8 MG/DL (ref 0.6–1.2)
GFR SERPLBLD CREATININE-BSD FMLA CKD-EPI: 81 ML/MIN/{1.73_M2}
GLUCOSE SERPL-MCNC: 96 MG/DL (ref 70–99)
POTASSIUM SERPL-SCNC: 3.9 MMOL/L (ref 3.5–5.1)
PROT SERPL-MCNC: 6.9 G/DL (ref 6.4–8.2)
SODIUM SERPL-SCNC: 139 MMOL/L (ref 136–145)

## 2025-01-08 PROCEDURE — 3075F SYST BP GE 130 - 139MM HG: CPT | Performed by: FAMILY MEDICINE

## 2025-01-08 PROCEDURE — 3017F COLORECTAL CA SCREEN DOC REV: CPT | Performed by: FAMILY MEDICINE

## 2025-01-08 PROCEDURE — 1090F PRES/ABSN URINE INCON ASSESS: CPT | Performed by: FAMILY MEDICINE

## 2025-01-08 PROCEDURE — 3079F DIAST BP 80-89 MM HG: CPT | Performed by: FAMILY MEDICINE

## 2025-01-08 PROCEDURE — 99214 OFFICE O/P EST MOD 30 MIN: CPT | Performed by: FAMILY MEDICINE

## 2025-01-08 PROCEDURE — G8427 DOCREV CUR MEDS BY ELIG CLIN: HCPCS | Performed by: FAMILY MEDICINE

## 2025-01-08 PROCEDURE — G8400 PT W/DXA NO RESULTS DOC: HCPCS | Performed by: FAMILY MEDICINE

## 2025-01-08 PROCEDURE — 1124F ACP DISCUSS-NO DSCNMKR DOCD: CPT | Performed by: FAMILY MEDICINE

## 2025-01-08 PROCEDURE — 1036F TOBACCO NON-USER: CPT | Performed by: FAMILY MEDICINE

## 2025-01-08 PROCEDURE — G2211 COMPLEX E/M VISIT ADD ON: HCPCS | Performed by: FAMILY MEDICINE

## 2025-01-08 PROCEDURE — G8419 CALC BMI OUT NRM PARAM NOF/U: HCPCS | Performed by: FAMILY MEDICINE

## 2025-01-08 RX ORDER — ARMODAFINIL 150 MG/1
TABLET ORAL
Qty: 30 TABLET | Refills: 5 | Status: SHIPPED | OUTPATIENT
Start: 2025-01-08 | End: 2026-01-22

## 2025-01-08 RX ORDER — ATORVASTATIN CALCIUM 80 MG/1
TABLET, FILM COATED ORAL
Qty: 90 TABLET | Refills: 3 | Status: SHIPPED | OUTPATIENT
Start: 2025-01-08

## 2025-01-08 RX ORDER — CYCLOBENZAPRINE HCL 5 MG
5 TABLET ORAL NIGHTLY PRN
Qty: 90 TABLET | Refills: 3 | Status: SHIPPED | OUTPATIENT
Start: 2025-01-08

## 2025-01-08 RX ORDER — BUPROPION HYDROCHLORIDE 150 MG/1
TABLET ORAL
Qty: 90 TABLET | Refills: 3 | Status: SHIPPED | OUTPATIENT
Start: 2025-01-08

## 2025-01-08 RX ORDER — LOSARTAN POTASSIUM AND HYDROCHLOROTHIAZIDE 12.5; 1 MG/1; MG/1
1 TABLET ORAL DAILY
Qty: 90 TABLET | Refills: 3 | Status: SHIPPED | OUTPATIENT
Start: 2025-01-08

## 2025-01-08 RX ORDER — METOPROLOL TARTRATE 25 MG/1
25 TABLET, FILM COATED ORAL 2 TIMES DAILY
Qty: 180 TABLET | Refills: 3 | Status: SHIPPED | OUTPATIENT
Start: 2025-01-08

## 2025-01-08 SDOH — HEALTH STABILITY: PHYSICAL HEALTH: ON AVERAGE, HOW MANY MINUTES DO YOU ENGAGE IN EXERCISE AT THIS LEVEL?: 20 MIN

## 2025-01-08 SDOH — ECONOMIC STABILITY: FOOD INSECURITY: WITHIN THE PAST 12 MONTHS, YOU WORRIED THAT YOUR FOOD WOULD RUN OUT BEFORE YOU GOT MONEY TO BUY MORE.: NEVER TRUE

## 2025-01-08 SDOH — ECONOMIC STABILITY: FOOD INSECURITY: WITHIN THE PAST 12 MONTHS, THE FOOD YOU BOUGHT JUST DIDN'T LAST AND YOU DIDN'T HAVE MONEY TO GET MORE.: NEVER TRUE

## 2025-01-08 SDOH — HEALTH STABILITY: PHYSICAL HEALTH: ON AVERAGE, HOW MANY DAYS PER WEEK DO YOU ENGAGE IN MODERATE TO STRENUOUS EXERCISE (LIKE A BRISK WALK)?: 3 DAYS

## 2025-01-08 ASSESSMENT — SLEEP AND FATIGUE QUESTIONNAIRES
HOW LIKELY ARE YOU TO NOD OFF OR FALL ASLEEP WHEN YOU ARE A PASSENGER IN A CAR FOR AN HOUR WITHOUT A BREAK: WOULD NEVER DOZE
HOW LIKELY ARE YOU TO NOD OFF OR FALL ASLEEP WHILE SITTING INACTIVE IN A PUBLIC PLACE: WOULD NEVER DOZE
HOW LIKELY ARE YOU TO NOD OFF OR FALL ASLEEP WHILE WATCHING TV: MODERATE CHANCE OF DOZING
HOW LIKELY ARE YOU TO NOD OFF OR FALL ASLEEP WHILE SITTING QUIETLY AFTER LUNCH WITHOUT ALCOHOL: WOULD NEVER DOZE
HOW LIKELY ARE YOU TO NOD OFF OR FALL ASLEEP WHILE SITTING AND READING: MODERATE CHANCE OF DOZING
HOW LIKELY ARE YOU TO NOD OFF OR FALL ASLEEP WHILE LYING DOWN TO REST IN THE AFTERNOON WHEN CIRCUMSTANCES PERMIT: HIGH CHANCE OF DOZING
HOW LIKELY ARE YOU TO NOD OFF OR FALL ASLEEP IN A CAR, WHILE STOPPED FOR A FEW MINUTES IN TRAFFIC: WOULD NEVER DOZE
ESS TOTAL SCORE: 7
HOW LIKELY ARE YOU TO NOD OFF OR FALL ASLEEP WHILE SITTING AND TALKING TO SOMEONE: WOULD NEVER DOZE

## 2025-01-08 ASSESSMENT — PATIENT HEALTH QUESTIONNAIRE - PHQ9
SUM OF ALL RESPONSES TO PHQ QUESTIONS 1-9: 2
SUM OF ALL RESPONSES TO PHQ9 QUESTIONS 1 & 2: 2
1. LITTLE INTEREST OR PLEASURE IN DOING THINGS: SEVERAL DAYS
SUM OF ALL RESPONSES TO PHQ QUESTIONS 1-9: 2
2. FEELING DOWN, DEPRESSED OR HOPELESS: SEVERAL DAYS

## 2025-01-08 ASSESSMENT — LIFESTYLE VARIABLES
HOW OFTEN DO YOU HAVE A DRINK CONTAINING ALCOHOL: NEVER
HOW MANY STANDARD DRINKS CONTAINING ALCOHOL DO YOU HAVE ON A TYPICAL DAY: 0
HOW OFTEN DO YOU HAVE A DRINK CONTAINING ALCOHOL: 1
HOW OFTEN DO YOU HAVE SIX OR MORE DRINKS ON ONE OCCASION: 1
HOW MANY STANDARD DRINKS CONTAINING ALCOHOL DO YOU HAVE ON A TYPICAL DAY: PATIENT DOES NOT DRINK

## 2025-01-08 NOTE — PROGRESS NOTES
Karen Moss         : 1960  [x] [facility]     [] [facility]      [] [facility]     [][facility]    [] [facility]  [] [facility]  [] [facility]   [] [facility] [] Other:  Diagnosis: [x] DAVID (G47.33) [] CSA (G47.31) [] Apnea (G47.30)   Length of Need: [] 12 Months [x] 99 Months [] Other:    Machine (CASSANDRA!):  [x] [employer]Med AirSense     Auto [] Other:       Humidifier: [x] Heated ()        [x] Water chamber replacement ()/ 1 per 6 months        Mask:  Please always start with the mask the patient used during the titraion   [x] Nasal () /1 per 3 months    [x] Patient choice -Size and fit mask    [] Dispense:     [x] Headgear () / 1 per 6 months        [x] Replacement Nasal Pillows ()/2 per month         Tubing: [x] Heated ()/1 per 3 months    [] Standard ()/1 per 3 months [] Other:           Filters: [x] Non-disposable ()/1 per 6 months     [x] Ultra-Fine, Disposable ()/2 per month        Miscellaneous: [x] Chin Strap ()/ 1 per 6 months [] O2 bleed-in:       LPM   [] Oximetry on CPAP/Bilevel []  Other:          Start Order Date: 25    MEDICAL JUSTIFICATION:  I, the undersigned, certify that the above prescribed supplies are medically necessary for this patient’s wellbeing.  In my opinion, the supplies are both reasonable and necessary in reference to accepted standards of medicalpractice in treatment of this patient’s condition.    Irma Spears MD      NPI: [id number]       Order Signed Date: 25    Electronically signed by Irma Spears MD on 2025 at 10:27 AM

## 2025-01-08 NOTE — PROGRESS NOTES
2025    Karen Moss (:  1960) is a 64 y.o. female, here for evaluation of the following chief complaint(s):  Hypertension      ASSESSMENT/PLAN:     Diagnosis Orders   1. Essential hypertension  Comprehensive Metabolic Panel    at gal cont meds check renal      2. Mixed hyperlipidemia  Comprehensive Metabolic Panel    LFts on statin cont      3. Degeneration of intervertebral disc of lumbar region with discogenic back pain  cyclobenzaprine (FLEXERIL) 5 MG tablet    RF PRN flexeril      4. Fibromyalgia      cont elavil HS, bupropion      5. Breast cancer screening by mammogram      reveiwed ACR 1 rpeat 1 year          Return in about 6 months (around 2025) for HTN, Hyperlipidemia.    An electronic signature was used to authenticate this note.    SUBJECTIVE/OBJECTIVE:  (NOTE : prior results listed below reviewed at this visit to assist in medical decision making.)    HPI / ROS    # Hyperlipidemia on statin rigoberto this no myalgias or weakness  Lab Results   Component Value Date    ALT 38 2025    AST 22 2025    ALKPHOS 74 2025    BILITOT 0.5 2025      Lab Results   Component Value Date    CHOL 169 04/10/2024    TRIG 131 04/10/2024    HDL 58 04/10/2024    LDL 85 04/10/2024    VLDL 27 2023     The 10-year ASCVD risk score (Nai MAKI, et al., 2019) is: 6.9%    Values used to calculate the score:      Age: 64 years      Sex: Female      Is Non- : No      Diabetic: No      Tobacco smoker: No      Systolic Blood Pressure: 130 mmHg      Is BP treated: Yes      HDL Cholesterol: 58 mg/dL      Total Cholesterol: 169 mg/dL    # HTN - rigoberto meds no CP/SOB  BP Readings from Last 3 Encounters:   25 130/84   25 130/84   24 122/80     Lab Results   Component Value Date/Time     2025 09:40 AM    K 3.9 2025 09:40 AM    CL 98 2025 09:40 AM    CO2 30 2025 09:40 AM    BUN 16 2025 09:40 AM    CREATININE 0.8 2025

## 2025-01-08 NOTE — PROGRESS NOTES
MD JUAN DIEGO Spears Board Certified in Sleep Medicine  Certified in Behavioral Sleep Medicine  Board Certified in Neurology [facility]  [address]   [address]  [place]  [address]  [phone]   [facility]  [address]  [address]  [place], [place] [address]                      [facility]  [address] [address] [phone]    Subjective:     Patient ID: Karen Moss is a 64 y.o. female.    Chief Complaint   Patient presents with    [facility]    Sleep Apnea       HPI:        Karen Moss is a 64 y.o. female was seen today as annual follow for severe obstructive sleep apnea and EDS with apnea with apnea hypopnea index of 33/h with lowest O2 saturation of 79%.  Uses the Nuvigil 150 mg as needed only, if he does not feel rested.    The Nuvigil helps her focus more.   Patient is using the PAP machine about 100% of the time, more than 4 hours a night about  100 %, in total average of 8:58 hours a night in last 90 days.  Currently on PAP at 13 cm (10-15), the AHI is only 1.9 events per hour at this pressure.  Patient improved regarding daytime sleepiness and fatigue, wakes up refreshed in the morning.  The Patient scored Guadalupe Sleepiness Score: 7 on Guadalupe Sleepiness Scale ( more than 10 is indicative of daytime sleepiness)   Patient has no problem with PAP pressure or mask.uses nasal pillow     BP is stable. Has not gained weight pounds since last visit. 158  DOT/CDL - N/A      Previous Report(s)Reviewed: historical medical records         Social History     Socioeconomic History    Marital status:      Spouse name: Not on file    Number of children: Not on file    Years of education: Not on file    Highest education level: Not on file   Occupational History    Not on file   Tobacco Use    Smoking status: Never     Passive exposure: Never

## 2025-01-09 ENCOUNTER — OFFICE VISIT (OUTPATIENT)
Dept: FAMILY MEDICINE CLINIC | Age: 65
End: 2025-01-09

## 2025-01-09 VITALS — WEIGHT: 158 LBS | BODY MASS INDEX: 29.08 KG/M2 | HEIGHT: 62 IN

## 2025-01-09 DIAGNOSIS — Z00.00 WELCOME TO MEDICARE PREVENTIVE VISIT: Primary | ICD-10-CM

## 2025-01-09 NOTE — PROGRESS NOTES
CareTeam (Including outside providers/suppliers regularly involved in providing care):   Patient Care Team:  Chuy Soto MD as PCP - General (Family Medicine)  Chuy Soto MD as PCP - Empaneled Provider  Stewart Porter Jr., MD (Pediatrics)  Kermit Hernandez MD as Consulting Physician (Orthopedic Surgery)  Lizz Brewer MD as Consulting Physician (Obstetrics & Gynecology)     Recommendations for Preventive Services Due: see orders and patient instructions/AVS.  Recommended screening schedule for the next 5-10 years is provided to the patient in written form: see Patient Instructions/AVS.     Reviewed and updated this visit:  Allergies  Meds       IJessica LPN, 1/9/2025, performed the documented evaluation under the direct supervision of the attending physician.

## 2025-01-09 NOTE — PATIENT INSTRUCTIONS
professional. [facility] [employer], [facility], disclaims any warranty or liability for your use of this information.    Personalized Preventive Plan for Karen Moss - 1/9/2025  Medicare offers a range of preventive health benefits. Some of the tests and screenings are paid in full while other may be subject to a deductible, co-insurance, and/or copay.  Some of these benefits include a comprehensive review of your medical history including lifestyle, illnesses that may run in your family, and various assessments and screenings as appropriate.  After reviewing your medical record and screening and assessments performed today your provider may have ordered immunizations, labs, imaging, and/or referrals for you.  A list of these orders (if applicable) as well as your Preventive Care list are included within your After Visit Summary for your review.

## 2025-03-26 RX ORDER — LOSARTAN POTASSIUM AND HYDROCHLOROTHIAZIDE 12.5; 1 MG/1; MG/1
1 TABLET ORAL DAILY
Qty: 90 TABLET | Refills: 3 | Status: SHIPPED | OUTPATIENT
Start: 2025-03-26

## 2025-04-14 ENCOUNTER — OFFICE VISIT (OUTPATIENT)
Dept: ORTHOPEDIC SURGERY | Age: 65
End: 2025-04-14
Payer: MEDICARE

## 2025-04-14 VITALS — HEIGHT: 62 IN | BODY MASS INDEX: 29.08 KG/M2 | WEIGHT: 158 LBS

## 2025-04-14 DIAGNOSIS — M70.62 TROCHANTERIC BURSITIS OF LEFT HIP: ICD-10-CM

## 2025-04-14 DIAGNOSIS — M25.552 PAIN OF LEFT HIP: Primary | ICD-10-CM

## 2025-04-14 PROCEDURE — 1036F TOBACCO NON-USER: CPT | Performed by: ORTHOPAEDIC SURGERY

## 2025-04-14 PROCEDURE — G8427 DOCREV CUR MEDS BY ELIG CLIN: HCPCS | Performed by: ORTHOPAEDIC SURGERY

## 2025-04-14 PROCEDURE — 99213 OFFICE O/P EST LOW 20 MIN: CPT | Performed by: ORTHOPAEDIC SURGERY

## 2025-04-14 PROCEDURE — G8400 PT W/DXA NO RESULTS DOC: HCPCS | Performed by: ORTHOPAEDIC SURGERY

## 2025-04-14 PROCEDURE — 1124F ACP DISCUSS-NO DSCNMKR DOCD: CPT | Performed by: ORTHOPAEDIC SURGERY

## 2025-04-14 PROCEDURE — 3017F COLORECTAL CA SCREEN DOC REV: CPT | Performed by: ORTHOPAEDIC SURGERY

## 2025-04-14 PROCEDURE — 1090F PRES/ABSN URINE INCON ASSESS: CPT | Performed by: ORTHOPAEDIC SURGERY

## 2025-04-14 PROCEDURE — 20610 DRAIN/INJ JOINT/BURSA W/O US: CPT | Performed by: ORTHOPAEDIC SURGERY

## 2025-04-14 PROCEDURE — G8419 CALC BMI OUT NRM PARAM NOF/U: HCPCS | Performed by: ORTHOPAEDIC SURGERY

## 2025-04-14 RX ORDER — BUPIVACAINE HYDROCHLORIDE 2.5 MG/ML
2 INJECTION, SOLUTION INFILTRATION; PERINEURAL ONCE
Status: COMPLETED | OUTPATIENT
Start: 2025-04-14 | End: 2025-04-14

## 2025-04-14 RX ORDER — TRIAMCINOLONE ACETONIDE 40 MG/ML
40 INJECTION, SUSPENSION INTRA-ARTICULAR; INTRAMUSCULAR ONCE
Status: COMPLETED | OUTPATIENT
Start: 2025-04-14 | End: 2025-04-14

## 2025-04-14 RX ORDER — NAPROXEN SODIUM 220 MG/1
220 TABLET, FILM COATED ORAL 2 TIMES DAILY WITH MEALS
COMMUNITY

## 2025-04-14 RX ORDER — ACETAMINOPHEN 500 MG
500 TABLET ORAL EVERY 6 HOURS PRN
COMMUNITY

## 2025-04-14 RX ADMIN — BUPIVACAINE HYDROCHLORIDE 5 MG: 2.5 INJECTION, SOLUTION INFILTRATION; PERINEURAL at 15:37

## 2025-04-14 RX ADMIN — TRIAMCINOLONE ACETONIDE 40 MG: 40 INJECTION, SUSPENSION INTRA-ARTICULAR; INTRAMUSCULAR at 15:37

## 2025-04-15 NOTE — PROGRESS NOTES
[facility]  Office Visit    Chief Complaint: Left hip pain    HPI:  Karen Moss is a 64 y.o. who is here for initial evaluation of left hip pain.  Her history is significant for bilateral total knee arthroplasty with Dr. Oneill.  Her right knee was replaced on December 2, 2020 and her left knee on May 26, 2020.  She reports lateral left hip pain that radiates down the side of her leg to her knee.  She also reports thigh pain.  The pain is also in her groin.  She takes Tylenol Aleve to help relieve symptoms.  She walks without assistive device.  Symptoms began last week with no inciting events.  Has not had this type of issue before.  She feels that this leg is slightly more swollen and painful in the knee as well.      Past Medical History:   Diagnosis Date    Carpal tunnel syndrome     Chronic back pain 1971    Fibromyalgia 1988    Chronic pain is worse and almost every joint    Fibromyositis     Fractures 2022    Right foot    HBP (high blood pressure)     Headache     AM - TMJ flares up    Hearing loss 2015    Hyperlipidemia     Obstructive sleep apnea on CPAP     Osteoarthritis 1990    Plantar fasciitis     Postoperative nausea     per patient lasted 3 weeks after knee replacement surgery    Seasonal allergies         ROS:  Constitutional: denies fever, chills, weight loss  MSK: denies pain in other joints, muscle aches  Neurological: denies numbness, tingling, weakness    Exam:  Ht 1.575 m (5' 2\")   Wt 71.7 kg (158 lb)  BMI 28.90 kg/m²      Appearance: sitting in exam room chair, appears to be in no acute distress, awake and alert  Resp: unlabored breathing on room air  Skin: warm, dry and intact with out erythema or significant increased temperature  Neuro: grossly intact both lower extremities. Intact sensation to light touch. Motor exam 4+ to 5/5 in all major motor groups.  LLE: Tender over greater trochanter. Examination demonstrates mild pain with logroll and Stinchfield.

## 2025-04-24 ENCOUNTER — TELEPHONE (OUTPATIENT)
Dept: ORTHOPEDIC SURGERY | Age: 65
End: 2025-04-24

## 2025-04-24 DIAGNOSIS — M16.12 PRIMARY OSTEOARTHRITIS OF LEFT HIP: Primary | ICD-10-CM

## 2025-04-24 NOTE — TELEPHONE ENCOUNTER
----- Message from Radha SOLIS sent at 4/24/2025  9:04 AM EDT -----  Regarding: SPECIALTY MESSAGE TO PROVIDER  Relationship to Patient: Self     Patient Message: Pt WAS TO CALL AND LET DR BELLA KNOW IF THE INJECTION WORKED. IT HAS NOT AND IS WORN OFF ALREADY. PLEASE CALL TO ARRANGE THE FLUOROSCOPE INJECTION ASAP.    --------------------------------------------------------------------------------------------------------------------------    Call Back Information: OK to leave message on voicemail  Preferred Call Back Number: Phone [phone]

## 2025-05-01 ENCOUNTER — OFFICE VISIT (OUTPATIENT)
Dept: ORTHOPEDIC SURGERY | Age: 65
End: 2025-05-01

## 2025-05-01 ENCOUNTER — HOSPITAL ENCOUNTER (OUTPATIENT)
Dept: GENERAL RADIOLOGY | Age: 65
Discharge: HOME | End: 2025-05-01
Attending: ORTHOPAEDIC SURGERY
Payer: MEDICARE

## 2025-05-01 DIAGNOSIS — M16.12 PRIMARY OSTEOARTHRITIS OF LEFT HIP: Primary | ICD-10-CM

## 2025-05-01 DIAGNOSIS — M16.12 PRIMARY OSTEOARTHRITIS OF LEFT HIP: ICD-10-CM

## 2025-05-01 PROCEDURE — 20610 DRAIN/INJ JOINT/BURSA W/O US: CPT

## 2025-05-01 PROCEDURE — 77002 NEEDLE LOCALIZATION BY XRAY: CPT

## 2025-05-01 PROCEDURE — 6360000002 HC RX W HCPCS

## 2025-05-02 ENCOUNTER — TRANSCRIBE ORDERS (OUTPATIENT)
Dept: ADMINISTRATIVE | Age: 65
End: 2025-05-02

## 2025-05-02 DIAGNOSIS — M16.12 ARTHRITIS OF LEFT HIP: Primary | ICD-10-CM

## 2025-05-27 ENCOUNTER — OFFICE VISIT (OUTPATIENT)
Dept: FAMILY MEDICINE CLINIC | Age: 65
End: 2025-05-27
Payer: MEDICARE

## 2025-05-27 VITALS
WEIGHT: 152.4 LBS | SYSTOLIC BLOOD PRESSURE: 130 MMHG | BODY MASS INDEX: 28.05 KG/M2 | HEIGHT: 62 IN | DIASTOLIC BLOOD PRESSURE: 80 MMHG | OXYGEN SATURATION: 96 % | HEART RATE: 99 BPM

## 2025-05-27 DIAGNOSIS — J01.90 ACUTE BACTERIAL SINUSITIS: Primary | ICD-10-CM

## 2025-05-27 DIAGNOSIS — B96.89 ACUTE BACTERIAL SINUSITIS: Primary | ICD-10-CM

## 2025-05-27 PROCEDURE — 1090F PRES/ABSN URINE INCON ASSESS: CPT | Performed by: NURSE PRACTITIONER

## 2025-05-27 PROCEDURE — 3075F SYST BP GE 130 - 139MM HG: CPT | Performed by: NURSE PRACTITIONER

## 2025-05-27 PROCEDURE — G8419 CALC BMI OUT NRM PARAM NOF/U: HCPCS | Performed by: NURSE PRACTITIONER

## 2025-05-27 PROCEDURE — 1124F ACP DISCUSS-NO DSCNMKR DOCD: CPT | Performed by: NURSE PRACTITIONER

## 2025-05-27 PROCEDURE — G8427 DOCREV CUR MEDS BY ELIG CLIN: HCPCS | Performed by: NURSE PRACTITIONER

## 2025-05-27 PROCEDURE — 3079F DIAST BP 80-89 MM HG: CPT | Performed by: NURSE PRACTITIONER

## 2025-05-27 PROCEDURE — 99214 OFFICE O/P EST MOD 30 MIN: CPT | Performed by: NURSE PRACTITIONER

## 2025-05-27 PROCEDURE — 1036F TOBACCO NON-USER: CPT | Performed by: NURSE PRACTITIONER

## 2025-05-27 PROCEDURE — G8400 PT W/DXA NO RESULTS DOC: HCPCS | Performed by: NURSE PRACTITIONER

## 2025-05-27 PROCEDURE — 3017F COLORECTAL CA SCREEN DOC REV: CPT | Performed by: NURSE PRACTITIONER

## 2025-05-27 RX ORDER — PREDNISONE 10 MG/1
10 TABLET ORAL 2 TIMES DAILY
Qty: 10 TABLET | Refills: 0 | Status: SHIPPED | OUTPATIENT
Start: 2025-05-27 | End: 2025-06-01

## 2025-05-27 NOTE — PROGRESS NOTES
Karen Moss (:  1960) is a 64 y.o. female,Established patient, here for evaluation of the following chief complaint(s):  Cough (X1 month), Congestion (Headache x 1 month), and Headache (X 1 month)         Assessment & Plan  Acute bacterial sinusitis   - push fluids, tylenol as needed, follow up if sx do not improve    Orders:    amoxicillin-clavulanate (AUGMENTIN) 875-125 MG per tablet; Take 1 tablet by mouth 2 times daily for 10 days    predniSONE (DELTASONE) 10 MG tablet; Take 1 tablet by mouth 2 times daily for 5 days      Return if symptoms worsen or fail to improve.       Subjective   HPI    Patient presents today for cough, congestion for about one month. State she has tried otc medication, nasal sprays with no relief. States sinus pain and pressure, ear pain/ fullness. Denies fevers, aches or chills. Denies sob. States slight wheezing.     Review of Systems   See HPI    Objective   Physical Exam  Vitals and nursing note reviewed.   Constitutional:       Appearance: Normal appearance.   HENT:      Right Ear: A middle ear effusion is present. Tympanic membrane is erythematous.      Left Ear: A middle ear effusion is present. Tympanic membrane is erythematous.      Nose: Congestion present. No rhinorrhea.      Right Sinus: Maxillary sinus tenderness and frontal sinus tenderness present.      Left Sinus: Maxillary sinus tenderness and frontal sinus tenderness present.      Mouth/Throat:      Pharynx: Posterior oropharyngeal erythema present.   Cardiovascular:      Rate and Rhythm: Normal rate and regular rhythm.   Pulmonary:      Effort: Pulmonary effort is normal.      Breath sounds: Normal breath sounds.   Musculoskeletal:         General: Normal range of motion.   Skin:     General: Skin is warm and dry.   Neurological:      General: No focal deficit present.      Mental Status: She is alert and oriented to person, place, and time.            On this date 2025 I have spent 30 minutes

## 2025-07-16 ENCOUNTER — OFFICE VISIT (OUTPATIENT)
Dept: FAMILY MEDICINE CLINIC | Age: 65
End: 2025-07-16
Payer: MEDICARE

## 2025-07-16 VITALS
SYSTOLIC BLOOD PRESSURE: 148 MMHG | RESPIRATION RATE: 18 BRPM | HEART RATE: 86 BPM | WEIGHT: 156 LBS | BODY MASS INDEX: 28.71 KG/M2 | OXYGEN SATURATION: 97 % | HEIGHT: 62 IN | DIASTOLIC BLOOD PRESSURE: 96 MMHG

## 2025-07-16 DIAGNOSIS — E78.2 MIXED HYPERLIPIDEMIA: ICD-10-CM

## 2025-07-16 DIAGNOSIS — I10 ESSENTIAL HYPERTENSION: Primary | ICD-10-CM

## 2025-07-16 DIAGNOSIS — Z13.1 SCREENING FOR DIABETES MELLITUS: ICD-10-CM

## 2025-07-16 DIAGNOSIS — G47.33 OSA (OBSTRUCTIVE SLEEP APNEA): ICD-10-CM

## 2025-07-16 LAB
ALBUMIN SERPL-MCNC: 4.6 G/DL (ref 3.4–5)
ALBUMIN/GLOB SERPL: 2 {RATIO} (ref 1.1–2.2)
ALP SERPL-CCNC: 65 U/L (ref 40–129)
ALT SERPL-CCNC: 26 U/L (ref 10–40)
ANION GAP SERPL CALCULATED.3IONS-SCNC: 13 MMOL/L (ref 3–16)
AST SERPL-CCNC: 23 U/L (ref 15–37)
BILIRUB SERPL-MCNC: 0.6 MG/DL (ref 0–1)
BUN SERPL-MCNC: 15 MG/DL (ref 7–20)
CALCIUM SERPL-MCNC: 10.1 MG/DL (ref 8.3–10.6)
CHLORIDE SERPL-SCNC: 100 MMOL/L (ref 99–110)
CHOLEST SERPL-MCNC: 293 MG/DL (ref 0–199)
CO2 SERPL-SCNC: 29 MMOL/L (ref 21–32)
CREAT SERPL-MCNC: 0.8 MG/DL (ref 0.6–1.2)
GFR SERPLBLD CREATININE-BSD FMLA CKD-EPI: 81 ML/MIN/{1.73_M2}
GLUCOSE SERPL-MCNC: 93 MG/DL (ref 70–99)
HDLC SERPL-MCNC: 54 MG/DL (ref 40–60)
LDLC SERPL CALC-MCNC: 206 MG/DL
POTASSIUM SERPL-SCNC: 3.4 MMOL/L (ref 3.5–5.1)
PROT SERPL-MCNC: 6.9 G/DL (ref 6.4–8.2)
SODIUM SERPL-SCNC: 142 MMOL/L (ref 136–145)
TRIGL SERPL-MCNC: 166 MG/DL (ref 0–150)
VLDLC SERPL CALC-MCNC: 33 MG/DL

## 2025-07-16 PROCEDURE — 36415 COLL VENOUS BLD VENIPUNCTURE: CPT | Performed by: FAMILY MEDICINE

## 2025-07-16 PROCEDURE — 99214 OFFICE O/P EST MOD 30 MIN: CPT | Performed by: FAMILY MEDICINE

## 2025-07-16 PROCEDURE — G8427 DOCREV CUR MEDS BY ELIG CLIN: HCPCS | Performed by: FAMILY MEDICINE

## 2025-07-16 PROCEDURE — 3077F SYST BP >= 140 MM HG: CPT | Performed by: FAMILY MEDICINE

## 2025-07-16 PROCEDURE — 1124F ACP DISCUSS-NO DSCNMKR DOCD: CPT | Performed by: FAMILY MEDICINE

## 2025-07-16 PROCEDURE — 3080F DIAST BP >= 90 MM HG: CPT | Performed by: FAMILY MEDICINE

## 2025-07-16 PROCEDURE — 1036F TOBACCO NON-USER: CPT | Performed by: FAMILY MEDICINE

## 2025-07-16 PROCEDURE — 3017F COLORECTAL CA SCREEN DOC REV: CPT | Performed by: FAMILY MEDICINE

## 2025-07-16 PROCEDURE — 1159F MED LIST DOCD IN RCRD: CPT | Performed by: FAMILY MEDICINE

## 2025-07-16 PROCEDURE — G8400 PT W/DXA NO RESULTS DOC: HCPCS | Performed by: FAMILY MEDICINE

## 2025-07-16 PROCEDURE — G2211 COMPLEX E/M VISIT ADD ON: HCPCS | Performed by: FAMILY MEDICINE

## 2025-07-16 PROCEDURE — G8419 CALC BMI OUT NRM PARAM NOF/U: HCPCS | Performed by: FAMILY MEDICINE

## 2025-07-16 PROCEDURE — 1090F PRES/ABSN URINE INCON ASSESS: CPT | Performed by: FAMILY MEDICINE

## 2025-07-16 RX ORDER — FLUTICASONE PROPIONATE 50 MCG
1 SPRAY, SUSPENSION (ML) NASAL DAILY PRN
Qty: 16 G | Refills: 3 | Status: SHIPPED | OUTPATIENT
Start: 2025-07-16

## 2025-07-16 SDOH — ECONOMIC STABILITY: FOOD INSECURITY: WITHIN THE PAST 12 MONTHS, THE FOOD YOU BOUGHT JUST DIDN'T LAST AND YOU DIDN'T HAVE MONEY TO GET MORE.: NEVER TRUE

## 2025-07-16 SDOH — ECONOMIC STABILITY: FOOD INSECURITY: WITHIN THE PAST 12 MONTHS, YOU WORRIED THAT YOUR FOOD WOULD RUN OUT BEFORE YOU GOT MONEY TO BUY MORE.: NEVER TRUE

## 2025-07-16 ASSESSMENT — PATIENT HEALTH QUESTIONNAIRE - PHQ9
SUM OF ALL RESPONSES TO PHQ QUESTIONS 1-9: 0
SUM OF ALL RESPONSES TO PHQ QUESTIONS 1-9: 0
1. LITTLE INTEREST OR PLEASURE IN DOING THINGS: NOT AT ALL
2. FEELING DOWN, DEPRESSED OR HOPELESS: NOT AT ALL
SUM OF ALL RESPONSES TO PHQ QUESTIONS 1-9: 0
SUM OF ALL RESPONSES TO PHQ QUESTIONS 1-9: 0

## 2025-07-16 NOTE — PROGRESS NOTES
2025    Karen Moss (:  1960) is a 65 y.o. female, here for evaluation of the following chief complaint(s):  Hypertension and Hip Pain (Left hip is worst then right, had cortisone shots with no relief)      ASSESSMENT/PLAN:     Diagnosis Orders   1. Essential hypertension  Comprehensive Metabolic Panel    at goal cont meds check renal      2. Mixed hyperlipidemia  Comprehensive Metabolic Panel    Lipid Panel    LFts lipids on statin cont      3. DAVID (obstructive sleep apnea)      OK cpap cont      4. Screening for diabetes mellitus  Hemoglobin A1C    a1c          Return in about 6 months (around 2026) for HTN, Hyperlipidemia, DAVID.    An electronic signature was used to authenticate this note.    SUBJECTIVE/OBJECTIVE:  (NOTE : prior results listed below reviewed at this visit to assist in medical decision making.)    HPI / ROS    # HTN - rigoberto meds no CP/SOB  BP Readings from Last 3 Encounters:   25 (!) 144/90   25 130/80   25 130/84     Lab Results   Component Value Date/Time     2025 09:40 AM    K 3.9 2025 09:40 AM    CL 98 2025 09:40 AM    CO2 30 2025 09:40 AM    BUN 16 2025 09:40 AM    CREATININE 0.8 2025 09:40 AM    GLUCOSE 96 2025 09:40 AM    CALCIUM 9.5 2025 09:40 AM       # Hyperlipidemia on statin rigoberto this no myalgias or weakness  Lab Results   Component Value Date    ALT 38 2025    AST 22 2025    ALKPHOS 74 2025    BILITOT 0.5 2025      Lab Results   Component Value Date    CHOL 169 04/10/2024    TRIG 131 04/10/2024    HDL 58 04/10/2024    LDL 85 04/10/2024    VLDL 27 2023     The 10-year ASCVD risk score (Nai MAKI, et al., 2019) is: 9.4%    Values used to calculate the score:      Age: 65 years      Sex: Female      Is Non- : No      Diabetic: No      Tobacco smoker: No      Systolic Blood Pressure: 144 mmHg      Is BP treated: Yes      HDL Cholesterol: 58

## 2025-07-17 LAB
EST. AVERAGE GLUCOSE BLD GHB EST-MCNC: 108.3 MG/DL
HBA1C MFR BLD: 5.4 %

## 2025-07-17 RX ORDER — METOPROLOL TARTRATE 25 MG/1
25 TABLET, FILM COATED ORAL 2 TIMES DAILY
Qty: 180 TABLET | Refills: 3 | Status: SHIPPED | OUTPATIENT
Start: 2025-07-17

## 2025-07-17 NOTE — TELEPHONE ENCOUNTER
Pt sent refill request early due to lost med.     Patient comment: The bottle is still lost.  Jessica said to let her kmow if i cant come up with it and she will refill it for Sr. [place].

## 2025-08-12 RX ORDER — FLUTICASONE PROPIONATE 50 MCG
1 SPRAY, SUSPENSION (ML) NASAL DAILY PRN
Qty: 16 G | Refills: 3 | Status: SHIPPED | OUTPATIENT
Start: 2025-08-12

## 2025-08-14 ENCOUNTER — TELEPHONE (OUTPATIENT)
Dept: ADMINISTRATIVE | Age: 65
End: 2025-08-14

## 2025-08-18 RX ORDER — ATORVASTATIN CALCIUM 80 MG/1
TABLET, FILM COATED ORAL
Qty: 90 TABLET | Refills: 3 | Status: SHIPPED | OUTPATIENT
Start: 2025-08-18

## (undated) DEVICE — SYRINGE MED 30ML STD CLR PLAS LUERLOCK TIP N CTRL DISP

## (undated) DEVICE — NEEDLE QUINCKE 22GX5": Brand: MEDLINE

## (undated) DEVICE — STERILE TOTAL KNEE DRAPE PACK: Brand: CARDINAL HEALTH

## (undated) DEVICE — COVER,TABLE,77X90,STERILE: Brand: MEDLINE

## (undated) DEVICE — PIN BNE FIX TEMP L140MM DIA4MM MAKO

## (undated) DEVICE — PIN BNE FIX L110MM DIA32MM

## (undated) DEVICE — GOWN,REINFORCED,POLY,AURORA,XXLARGE,STR: Brand: MEDLINE

## (undated) DEVICE — BANDAGE COMPR W4INXL15FT BGE E SGL LAYERED CLP CLSR

## (undated) DEVICE — MERCY HEALTH WEST TURNOVER: Brand: MEDLINE INDUSTRIES, INC.

## (undated) DEVICE — BLADE SURG SAW S STL NAR OSC W/ SERR EDGE DISP

## (undated) DEVICE — 4-PORT MANIFOLD: Brand: NEPTUNE 2

## (undated) DEVICE — CORD RETRCT SIL

## (undated) DEVICE — NERVE BLOCK TRAY: Brand: MEDLINE INDUSTRIES, INC.

## (undated) DEVICE — SUTURE VCRL SZ 1 L18IN ABSRB UD L36MM CT-1 1/2 CIR J841D

## (undated) DEVICE — SOLUTION IV 1000ML 0.9% SOD CHL FOR IRRIG PLAS CONT

## (undated) DEVICE — ZIMMER® STERILE DISPOSABLE TOURNIQUET CUFF WITH PLC, DUAL PORT, SINGLE BLADDER, 34 IN. (86 CM)

## (undated) DEVICE — KIT TRK KNEE PROC VIZADISC

## (undated) DEVICE — MARKER SKIN MINI PUR FINE REGULAR TIP W RUL XL PREP RESIST

## (undated) DEVICE — SOLUTION IV IRRIG POUR BRL 0.9% SODIUM CHL 2F7124

## (undated) DEVICE — ELECTRODE PT RET AD L9FT HI MOIST COND ADH HYDRGEL CORDED

## (undated) DEVICE — TOTAL BASIC PK

## (undated) DEVICE — AVANOS* TUOHY EPIDURAL NEEDLE: Brand: AVANOS

## (undated) DEVICE — Z DISCONTINUED USE 2716304 SUTURE STRATAFIX SPRL SZ 3-0 L12IN ABSRB UD FS-1 L24MM 3/8

## (undated) DEVICE — EPIDURAL TRAY: Brand: MEDLINE INDUSTRIES, INC.

## (undated) DEVICE — GOWN,REINF,POLY,AURORA,XLNG/XXL,STRL: Brand: MEDLINE

## (undated) DEVICE — BOOT POS LEG DEMAYO

## (undated) DEVICE — COVER,MAYO STAND,STERILE: Brand: MEDLINE

## (undated) DEVICE — HYPODERMIC SAFETY NEEDLE: Brand: MAGELLAN

## (undated) DEVICE — COVER LT HNDL BLU PLAS

## (undated) DEVICE — KIT DRP FOR RIO ROBOTIC ARM ASST SYS

## (undated) DEVICE — BANDAGE COMPR W6INXL12FT SMOOTH FOR LIMB EXSANG ESMARCH

## (undated) DEVICE — SYRINGE MED 3ML CLR PLAS STD N CTRL LUERLOCK TIP DISP

## (undated) DEVICE — CUTTER SURG OD38MM PAT KNEE DISP FOR RM SYS XCELERATE

## (undated) DEVICE — SOLUTION PREP POVIDONE IOD FOR SKIN MUCOUS MEM PRIOR TO

## (undated) DEVICE — PAD,NON-ADHERENT,3X8,STERILE,LF,1/PK: Brand: MEDLINE

## (undated) DEVICE — BANDAGE COMPR W6INXL4.5YD LTWT E EC SGL LAYERED CLP CLSR

## (undated) DEVICE — SUTURE STRATAFIX SPRL SZ 2 0 L14IN ABSRB UD MH L36MM 1 2 CIR SXMD2B401

## (undated) DEVICE — 1010 S-DRAPE TOWEL DRAPE 10/BX: Brand: STERI-DRAPE™

## (undated) DEVICE — SUTURE ABSORBABLE MONOFILAMENT 1-0 OS8 14 IN STRATAFIX SPRL SXPD2B202

## (undated) DEVICE — GLOVE SURG SZ 85 L12IN FNGR THK13MIL WHT ISOLEX POLYISOPRENE

## (undated) DEVICE — Z DUP USE 2701075 SYSTEM SKIN CLSR 42CM DERMBND PRINEO

## (undated) DEVICE — GLOVE ORANGE PI 8 1/2   MSG9085

## (undated) DEVICE — BASIC SINGLE BASIN 1-LF: Brand: MEDLINE INDUSTRIES, INC.

## (undated) DEVICE — DECANTER BAG 9": Brand: MEDLINE INDUSTRIES, INC.

## (undated) DEVICE — Z DISCONTINUED USE 2368412 KIT POS LEG DISP

## (undated) DEVICE — COTTON UNDERCAST PADDING,CRIMPED FINISH: Brand: WEBRIL

## (undated) DEVICE — GLOVE ORANGE PI 8   MSG9080

## (undated) DEVICE — NEEDLE HYPO 18GA L1.5IN THN WALL PIVOTING SHLD BVL ORIENTED

## (undated) DEVICE — NEEDLE SPINAL 22GA L3.5IN SPINOCAN

## (undated) DEVICE — 3M™ COBAN™ NL STERILE NON-LATEX SELF-ADHERENT WRAP, 2083S, 3 IN X 5 YD (7,5 CM X 4,5 M), 24 ROLLS/CASE: Brand: 3M™ COBAN™

## (undated) DEVICE — CHLORAPREP 26ML ORANGE

## (undated) DEVICE — TOTAL KNEE: Brand: MEDLINE INDUSTRIES, INC.

## (undated) DEVICE — KIT INT FIX FEM TIB CKPT MAKOPLASTY

## (undated) DEVICE — HANDPIECE SET WITH HIGH FLOW TIP AND SUCTION TUBE: Brand: INTERPULSE

## (undated) DEVICE — SHEET,DRAPE,53X77,STERILE: Brand: MEDLINE